# Patient Record
Sex: FEMALE | Race: WHITE | NOT HISPANIC OR LATINO | Employment: OTHER | URBAN - METROPOLITAN AREA
[De-identification: names, ages, dates, MRNs, and addresses within clinical notes are randomized per-mention and may not be internally consistent; named-entity substitution may affect disease eponyms.]

---

## 2017-02-06 ENCOUNTER — HOSPITAL ENCOUNTER (OUTPATIENT)
Dept: RADIOLOGY | Facility: MEDICAL CENTER | Age: 67
End: 2017-02-06
Attending: NURSE PRACTITIONER
Payer: MEDICARE

## 2017-02-06 DIAGNOSIS — Z12.39 SCREENING FOR BREAST CANCER: ICD-10-CM

## 2017-02-06 PROCEDURE — 77063 BREAST TOMOSYNTHESIS BI: CPT

## 2017-04-11 DIAGNOSIS — Z13.1 SCREENING FOR DIABETES MELLITUS (DM): ICD-10-CM

## 2017-04-11 DIAGNOSIS — E03.9 HYPOTHYROIDISM, UNSPECIFIED TYPE: ICD-10-CM

## 2017-04-11 DIAGNOSIS — E78.5 HYPERLIPIDEMIA, UNSPECIFIED HYPERLIPIDEMIA TYPE: ICD-10-CM

## 2017-04-11 DIAGNOSIS — E55.9 VITAMIN D DEFICIENCY: ICD-10-CM

## 2017-04-11 RX ORDER — ERGOCALCIFEROL 1.25 MG/1
CAPSULE ORAL
Qty: 4 CAP | Refills: 0 | Status: SHIPPED | OUTPATIENT
Start: 2017-04-11 | End: 2018-09-05

## 2017-04-20 ENCOUNTER — HOSPITAL ENCOUNTER (EMERGENCY)
Facility: MEDICAL CENTER | Age: 67
End: 2017-04-20
Attending: EMERGENCY MEDICINE
Payer: MEDICARE

## 2017-04-20 ENCOUNTER — APPOINTMENT (OUTPATIENT)
Dept: RADIOLOGY | Facility: MEDICAL CENTER | Age: 67
End: 2017-04-20
Attending: EMERGENCY MEDICINE
Payer: MEDICARE

## 2017-04-20 VITALS
HEIGHT: 62 IN | BODY MASS INDEX: 29.21 KG/M2 | WEIGHT: 158.73 LBS | HEART RATE: 86 BPM | TEMPERATURE: 98.2 F | OXYGEN SATURATION: 98 % | SYSTOLIC BLOOD PRESSURE: 142 MMHG | RESPIRATION RATE: 19 BRPM | DIASTOLIC BLOOD PRESSURE: 73 MMHG

## 2017-04-20 DIAGNOSIS — J01.00 ACUTE MAXILLARY SINUSITIS, RECURRENCE NOT SPECIFIED: ICD-10-CM

## 2017-04-20 DIAGNOSIS — J40 BRONCHITIS: ICD-10-CM

## 2017-04-20 PROCEDURE — 99283 EMERGENCY DEPT VISIT LOW MDM: CPT

## 2017-04-20 PROCEDURE — 71010 DX-CHEST-LIMITED (1 VIEW): CPT

## 2017-04-20 RX ORDER — AZITHROMYCIN 250 MG/1
TABLET, FILM COATED ORAL
Qty: 6 TAB | Refills: 0 | Status: SHIPPED | OUTPATIENT
Start: 2017-04-20 | End: 2017-05-29

## 2017-04-20 ASSESSMENT — LIFESTYLE VARIABLES: DO YOU DRINK ALCOHOL: NO

## 2017-04-20 NOTE — ED NOTES
Pt ambulates to triage with c/c cough, congestion, runny nose & SOB since yesterday.  A&ox4.  Pt to lobby & advised to inform RN of any changes.

## 2017-04-20 NOTE — ED PROVIDER NOTES
ED Provider Note    CHIEF COMPLAINT  Chief Complaint   Patient presents with   • Congestion     since yesterday   • Shortness of Breath   • Cough   • Runny Nose       HPI  Kristen Carrera is a 66 y.o. female who presents for evaluation of upper respiratory symptoms.  Patient presents with 2 day history of nasal congestion, clear rhinorrhea, cough, and shortness of breath.  Patient states that she is taking care of her grandchild was diagnosed with RSV and roseola.  She denies any associated: Fever, purulent sputum, hemoptysis, chest pain, abdominal pain, vomiting, diarrhea, pain or swelling in lower extremities;    REVIEW OF SYSTEMS  See HPI for further details. All other systems negative.    PAST MEDICAL HISTORY  Past Medical History   Diagnosis Date   • Psychiatric disorder    • Anxiety    • Depression    • ADD (attention deficit disorder)    • Hypothyroidism    • Hyperlipidemia    • Arthritis    • PATENT FORAMEN OVALE LEFT TO RIGHT SHUNT 12/8/2010   • History of echocardiogram 3/15/2012   • Abnormal myocardial perfusion study 3/15/2012   • History of abdominoplasty 3/15/2012   • Acute post-traumatic stress disorder 3/15/2012   • Osteoporosis 8/19/2014   • Midline low back pain without sciatica 8/4/2015   • CTS (carpal tunnel syndrome)      B/L       FAMILY HISTORY  Family History   Problem Relation Age of Onset   • Hypertension Mother    • Cancer Mother      UTERUS   • Heart Disease Mother      Arteriosclerotic Cardiovascular Disease   • Other Mother      Coronary Artery Bypass Graft   • Psychiatry Mother    • Arthritis Mother    • Cancer Father      PANCREATIC   • Cancer Sister 51     BREAST   • Stroke Neg Hx    • Diabetes Neg Hx    • Lung Disease Neg Hx        SOCIAL HISTORY  Patient denies tobacco use;    SURGICAL HISTORY  Past Surgical History   Procedure Laterality Date   • Pr anesth,isha hyst fol neuraxial anal/anes     • Appendectomy     • Tubal coagulation laparoscopic bilateral     • Other       TUMMY ZHANE  "BREAST REDUCTION, LIPOSUCTION OF HIPS AND THIGHS.   • Tonsillectomy     • Pr mammary ductogram, single     • Pr reduction of large breast     • Abdominoplasty  1977       CURRENT MEDICATIONS  See nurses notes    ALLERGIES  Allergies   Allergen Reactions   • Hydrocodone      Nightmares and \"makes my skin crawl\"       PHYSICAL EXAM  VITAL SIGNS: /87 mmHg  Pulse 87  Temp(Src) 36.8 °C (98.2 °F) (Temporal)  Resp 18  Ht 1.575 m (5' 2\")  Wt 72 kg (158 lb 11.7 oz)  BMI 29.02 kg/m2  SpO2 95%   Constitutional: A 66-year-old female, awake, oriented ×3   HENT: Normocephalic, Atraumatic, Nares:Clear, Oropharynx: moist, well hydrated, posterior pharynx:clear; minimal tenderness over the maxillary sinus region; mild nasal congestion but no purulent drainage;   Eyes: PERRL, EOMI, Conjunctiva normal, No discharge.   Neck: Normal range of motion, No tenderness, Supple, No stridor.   Lymphatic: No lymphadenopathy noted.   Cardiovascular: Regular rate and rhythm without mumurs, gallups, rubs   Thorax & Lungs: Bilateral rhonchi, No respiratory distress, No wheezing, no stridor, no rales. No chest tenderness.   Abdomen: Soft, nontender, nondistended, no organomegaly, positive bowel sounds normal in quality. No guarding or rebound.  Skin: Good skin turgor, pink, warm, dry. No rashes, petechiae, purpura. Normal capillary refill.   Back: No tenderness, No CVA tenderness.   Extremities: Intact distal pulses, No edema, No tenderness, No cyanosis,  Vascular: Pulses are 2+, symmetric in the upper and lower extremities.  Musculoskeletal: Diffuse arthritic changes. No tenderness to palpation or major deformities noted.   Neurologic: Alert & oriented x 3,  No gross focal deficits noted.   Psychiatric: Affect normal, Judgment normal, Mood normal.       RADIOLOGY/PROCEDURES  DX-CHEST-LIMITED (1 VIEW)   Final Result      No acute cardiopulmonary abnormality identified.            COURSE & MEDICAL DECISION MAKING  Pertinent Labs & Imaging " studies reviewed. (See chart for details)  Discussion: At this time, the patient presents with upper respiratory symptomatology.  Patient's findings are consistent with bronchitis and possibly a very mild early sinusitis.  I see no indication for further laboratory studies or imaging studies.  I discussed the findings and treatment plan the patient.  She indicates that she is comfortable with this expiration and disposition.    FINAL IMPRESSION  1. Bronchitis    2. Acute maxillary sinusitis, recurrence not specified        PLAN  1.  Appropriate discharge instructions given  2.  Azithromycin, Z-Guanakito  3.  Phenergan with codeine, 60 mL   4.  Follow-up with primary care    Electronically signed by: Guy G Gansert, 4/20/2017 1:54 PM

## 2017-04-20 NOTE — DISCHARGE INSTRUCTIONS

## 2017-04-20 NOTE — ED AVS SNAPSHOT
Home Care Instructions                                                                                                                Kristen Carrera   MRN: [id number]    Department:  [facility], Emergency Dept   Date of Visit:  4/20/2017            [facility], Emergency Dept    [address] [address]    Phone:  [phone]      You were seen by     Guy G Gansert, M.D.      Your Diagnosis Was     Bronchitis     J40       Follow-up Information     1. Follow up with RANDELL Kong.    Specialty:  Family Medicine    Contact information    [address]  [address]  [facility] [address] [phone]        Medication Information     Review all of your home medications and newly ordered medications with your primary doctor and/or pharmacist as soon as possible. Follow medication instructions as directed by your doctor and/or pharmacist.     Please keep your complete medication list with you and share with your physician. Update the information when medications are discontinued, doses are changed, or new medications (including over-the-counter products) are added; and carry medication information at all times in the event of emergency situations.               Medication List      START taking these medications        Instructions    Morning Afternoon Evening Bedtime    azithromycin 250 MG Tabs   Commonly known as:  ZITHROMAX        Take two tabs by mouth on day one, then one tab by mouth daily on days 2-5.                        prometh-phenylephrine-codeine 5-6.25-10 MG/5ML Syrp   Commonly known as:  PHENERGAN VC CODEINE        Take 5 mL by mouth every 6 hours as needed (cough).   Dose:  5 mL                          ASK your doctor about these medications        Instructions    Morning Afternoon Evening Bedtime    acetaminophen-codeine #3 300-30 MG Tabs   Commonly known as:  TYLENOL #3        Take 1 Tab by mouth every 6 hours as needed for Moderate Pain or Severe Pain.     Dose:  1 Tab                        acetaminophen/caffeine/butalbital 325-40-50 mg -40 MG Tabs   Commonly known as:  FIORICET        TAKE ONE TABLET BY MOUTH EVERY 8 HOURS AS NEEDED FOR MIGRAINE                        ADDERALL XR (20MG) 20 MG per XR capsule   Generic drug:  amphetamine-dextroamphetamine                             albuterol 108 (90 BASE) MCG/ACT Aers inhalation aerosol        Inhale 2 Puffs by mouth every four hours as needed for Shortness of Breath.   Dose:  2 Puff                        alendronate 70 MG Tabs   Commonly known as:  FOSAMAX        TAKE ONE TABLET BY MOUTH EVERY 7 DAYS                        calcium carbonate 500 MG Tabs   Commonly known as:  OS-ELODIA 500        Take 1 Tab by mouth every day.   Dose:  1250 mg                        meloxicam 7.5 MG Tabs   Commonly known as:  MOBIC        Take 1 Tab by mouth every day.   Dose:  7.5 mg                        PRISTIQ 100 MG Tb24   Generic drug:  Desvenlafaxine Succinate                             ranitidine 150 MG Tabs   Commonly known as:  ZANTAC        TAKE ONE TABLET BY MOUTH TWICE DAILY                        vitamin D (Ergocalciferol) 32151 UNITS Caps capsule   Commonly known as:  DRISDOL        TAKE ONE CAPSULE BY MOUTH ONCE A WEEK FOR 8 WEEKS THEN ONE CAPSULE EVERY 2 WEEKS THEREAFTER                        zolpidem 10 MG Tabs   Commonly known as:  AMBIEN        Take 10 mg by mouth at bedtime as needed. Indications: Trouble Sleeping   Dose:  10 mg                             Where to Get Your Medications      These medications were sent to [facility] [address] [place], [place] [facility] [address] [address]     Phone:  [phone]    - azithromycin 250 MG Tabs      You can get these medications from any pharmacy     Bring a paper prescription for each of these medications    - prometh-phenylephrine-codeine 5-6.25-10 MG/5ML Syrp            Procedures and tests performed during your visit     DX-CHEST-LIMITED (1  VIEW)        Discharge Instructions       Bronchitis  Bronchitis is the body's way of reacting to injury and/or infection (inflammation) of the bronchi. Bronchi are the air tubes that extend from the windpipe into the lungs. If the inflammation becomes severe, it may cause shortness of breath.  CAUSES   Inflammation may be caused by:  · A virus.  · Germs (bacteria).  · Dust.  · Allergens.  · Pollutants and many other irritants.  The cells lining the bronchial tree are covered with tiny hairs (cilia). These constantly beat upward, away from the lungs, toward the mouth. This keeps the lungs free of pollutants. When these cells become too irritated and are unable to do their job, mucus begins to develop. This causes the characteristic cough of bronchitis. The cough clears the lungs when the cilia are unable to do their job. Without either of these protective mechanisms, the mucus would settle in the lungs. Then you would develop pneumonia.  Smoking is a common cause of bronchitis and can contribute to pneumonia. Stopping this habit is the single most important thing you can do to help yourself.  TREATMENT   · Your caregiver may prescribe an antibiotic if the cough is caused by bacteria. Also, medicines that open up your airways make it easier to breathe. Your caregiver may also recommend or prescribe an expectorant. It will loosen the mucus to be coughed up. Only take over-the-counter or prescription medicines for pain, discomfort, or fever as directed by your caregiver.  · Removing whatever causes the problem (smoking, for example) is critical to preventing the problem from getting worse.  · Cough suppressants may be prescribed for relief of cough symptoms.  · Inhaled medicines may be prescribed to help with symptoms now and to help prevent problems from returning.  · For those with recurrent (chronic) bronchitis, there may be a need for steroid medicines.  SEEK IMMEDIATE MEDICAL CARE IF:   · During treatment, you  develop more pus-like mucus (purulent sputum).  · You have a fever.  · Your baby is older than 3 months with a rectal temperature of 102° F (38.9° C) or higher.  · Your baby is 3 months old or younger with a rectal temperature of 100.4° F (38° C) or higher.  · You become progressively more ill.  · You have increased difficulty breathing, wheezing, or shortness of breath.  It is necessary to seek immediate medical care if you are elderly or sick from any other disease.  MAKE SURE YOU:   · Understand these instructions.  · Will watch your condition.  · Will get help right away if you are not doing well or get worse.  Document Released: 12/18/2006 Document Revised: 03/11/2013 Document Reviewed: 10/27/2009  [employer]® Patient Information ©2014 [employer].            Patient Information     Patient Information    Following emergency treatment: all patient requiring follow-up care must return either to a private physician or a clinic if your condition worsens before you are able to obtain further medical attention, please return to the emergency room.     Billing Information    At [facility], we work to make the billing process streamlined for our patients.  Our Representatives are here to answer any questions you may have regarding your hospital bill.  If you have insurance coverage and have supplied your insurance information to us, we will submit a claim to your insurer on your behalf.  Should you have any questions regarding your bill, we can be reached online or by phone as follows:  Online: You are able pay your bills online or live chat with our representatives about any billing questions you may have. We are here to help Monday - Friday from 8:00am to 7:30pm and 9:00am - 12:00pm on Saturdays.  Please visit [email or web address]  for more information.   Phone:  [phone] or [phone]    Please note that your emergency physician, surgeon, pathologist, radiologist,  anesthesiologist, and other specialists are not employed by [facility] and will therefore bill separately for their services.  Please contact them directly for any questions concerning their bills at the numbers below:     Emergency Physician Services:  [phone]  [facility]:  [phone]  [facility]:  [phone]  [facility]:  [phone]    1. Your final bill may vary from the amount quoted upon discharge if all procedures are not complete at that time, or if your doctor has additional procedures of which we are not aware. You will receive an additional bill if you return to the Emergency Department at [facility] for suture removal regardless of the facility of which the sutures were placed.     2. Please arrange for settlement of this account at the emergency registration.    3. All self-pay accounts are due in full at the time of treatment.  If you are unable to meet this obligation then payment is expected within 4-5 days.     4. If you have had radiology studies (CT, X-ray, Ultrasound, MRI), you have received a preliminary result during your emergency department visit. Please contact the radiology department [phone] to receive a copy of your final result. Please discuss the Final result with your primary physician or with the follow up physician provided.     Crisis Hotline:  [place] Crisis Hotline:  4-357-EXFANNO or 1-691.153.8947  [place] Crisis Hotline:    1-206.207.2503 or 129-389-6881         ED Discharge Follow Up Questions    1. In order to provide you with very good care, we would like to follow up with a phone call in the next few days.  May we have your permission to contact you?     YES /  NO    2. What is the best phone number to call you? (       )_____-__________    3. What is the best time to call you?      Morning  /  Afternoon  /  Evening                   Patient Signature:   ____________________________________________________________    Date:  ____________________________________________________________

## 2017-04-20 NOTE — ED NOTES
Patient given d/c instructions and prescriptions, verbalized understanding. AAOx4, VSS, d/c'd home.

## 2017-04-20 NOTE — ED AVS SNAPSHOT
G[employer] Access Code: [email or web address]  Expires: 5/20/2017  3:19 PM    Glo[employer] Technology  A secure, online tool to manage your health information     "[employer]’s GloPos Technology® is a secure, online tool that connects you to your personalized health information from the privacy of your home -- day or night - making it very easy for you to manage your healthcare. Once the activation process is completed, you can even access your medical information using the GloPos Technology alicia, which is available for free in the Apple Alicia store or Google Play store.     G[employer] Technology provides the following levels of access (as shown below):   My Chart Features   [facility] Primary Care Doctor [facility]  Specialists [facility]  Urgent  Care Non-[facility]  Primary Care  Doctor   Email your healthcare team securely and privately 24/7 X X X X   Manage appointments: schedule your next appointment; view details of past/upcoming appointments X      Request prescription refills. X      View recent personal medical records, including lab and immunizations X X X X   View health record, including health history, allergies, medications X X X X   Read reports about your outpatient visits, procedures, consult and ER notes X X X X   See your discharge summary, which is a recap of your hospital and/or ER visit that includes your diagnosis, lab results, and care plan. X X       How to register for Glo[employer] Technology:  1. Go to  [email or web address].  2. Click on the Sign Up Now box, which takes you to the New Member Sign Up page. You will need to provide the following information:  a. Enter your GloPos Technology Access Code exactly as it appears at the top of this page. (You will not need to use this code after you’ve completed the sign-up process. If you do not sign up before the expiration date, you must request a new code.)   b. Enter your date of birth.   c. Enter your home email address.   d. Click Submit, and follow the next screen’s instructions.  3. Create a GloPos Technology ID. This will be your GloPos Technology  login ID and cannot be changed, so think of one that is secure and easy to remember.  4. Create a [employer] password. You can change your password at any time.  5. Enter your Password Reset Question and Answer. This can be used at a later time if you forget your password.   6. Enter your e-mail address. This allows you to receive e-mail notifications when new information is available in [employer].  7. Click Sign Up. You can now view your health information.    For assistance activating your [employer] account, call [phone]

## 2017-04-20 NOTE — ED AVS SNAPSHOT
4/20/2017    Kristen Carrera  [address]  [place] [address]    Dear Kristen:    [facility] wants to ensure your discharge home is safe and you or your loved ones have had all of your questions answered regarding your care after you leave the hospital.    Below is a list of resources and contact information should you have any questions regarding your hospital stay, follow-up instructions, or active medical symptoms.    Questions or Concerns Regarding… Contact   Medical Questions Related to Your Discharge  (7 days a week, 8am-5pm) Contact a Nurse Care Coordinator   [phone]   Medical Questions Not Related to Your Discharge  (24 hours a day / 7 days a week)  Contact the Nurse Health Line   [phone]    Medications or Discharge Instructions Refer to your discharge packet   or contact your [facility] Primary Care Provider   [phone]   Follow-up Appointment(s) Schedule your appointment via [employer]   or contact Scheduling [phone]   Billing Review your statement via [employer]  or contact Billing [phone]   Medical Records Review your records via [employer]   or contact Medical Records [phone]     You may receive a telephone call within two days of discharge. This call is to make certain you understand your discharge instructions and have the opportunity to have any questions answered. You can also easily access your medical information, test results and upcoming appointments via the [employer] free online health management tool. You can learn more and sign up at [email or web address]. For assistance setting up your [employer] account, please call [phone].    Once again, we want to ensure your discharge home is safe and that you have a clear understanding of any next steps in your care. If you have any questions or concerns, please do not hesitate to contact us, we are here for you. Thank you for choosing [facility] for your healthcare needs.    Sincerely,    Your [facility] Healthcare Team

## 2017-04-21 ENCOUNTER — PATIENT OUTREACH (OUTPATIENT)
Dept: HEALTH INFORMATION MANAGEMENT | Facility: OTHER | Age: 67
End: 2017-04-21

## 2017-05-29 ENCOUNTER — HOSPITAL ENCOUNTER (EMERGENCY)
Facility: MEDICAL CENTER | Age: 67
End: 2017-05-29
Payer: MEDICARE

## 2017-05-29 ENCOUNTER — APPOINTMENT (OUTPATIENT)
Dept: RADIOLOGY | Facility: MEDICAL CENTER | Age: 67
End: 2017-05-29
Payer: MEDICARE

## 2017-05-29 VITALS
RESPIRATION RATE: 16 BRPM | BODY MASS INDEX: 30.55 KG/M2 | DIASTOLIC BLOOD PRESSURE: 78 MMHG | HEART RATE: 74 BPM | TEMPERATURE: 97.1 F | HEIGHT: 62 IN | OXYGEN SATURATION: 99 % | WEIGHT: 166.01 LBS | SYSTOLIC BLOOD PRESSURE: 169 MMHG

## 2017-05-29 LAB
ALBUMIN SERPL BCP-MCNC: 3.9 G/DL (ref 3.2–4.9)
ALBUMIN/GLOB SERPL: 1.1 G/DL
ALP SERPL-CCNC: 45 U/L (ref 30–99)
ALT SERPL-CCNC: 16 U/L (ref 2–50)
ANION GAP SERPL CALC-SCNC: 7 MMOL/L (ref 0–11.9)
APTT PPP: 27.5 SEC (ref 24.7–36)
AST SERPL-CCNC: 28 U/L (ref 12–45)
BASOPHILS # BLD AUTO: 1 % (ref 0–1.8)
BASOPHILS # BLD: 0.06 K/UL (ref 0–0.12)
BILIRUB SERPL-MCNC: 0.4 MG/DL (ref 0.1–1.5)
BNP SERPL-MCNC: 86 PG/ML (ref 0–100)
BUN SERPL-MCNC: 25 MG/DL (ref 8–22)
CALCIUM SERPL-MCNC: 9.6 MG/DL (ref 8.5–10.5)
CHLORIDE SERPL-SCNC: 110 MMOL/L (ref 96–112)
CO2 SERPL-SCNC: 24 MMOL/L (ref 20–33)
CREAT SERPL-MCNC: 0.92 MG/DL (ref 0.5–1.4)
EKG IMPRESSION: NORMAL
EOSINOPHIL # BLD AUTO: 0.26 K/UL (ref 0–0.51)
EOSINOPHIL NFR BLD: 4.1 % (ref 0–6.9)
ERYTHROCYTE [DISTWIDTH] IN BLOOD BY AUTOMATED COUNT: 44.9 FL (ref 35.9–50)
EST. AVERAGE GLUCOSE BLD GHB EST-MCNC: 103 MG/DL
GFR SERPL CREATININE-BSD FRML MDRD: >60 ML/MIN/1.73 M 2
GLOBULIN SER CALC-MCNC: 3.4 G/DL (ref 1.9–3.5)
GLUCOSE SERPL-MCNC: 118 MG/DL (ref 65–99)
HBA1C MFR BLD: 5.2 % (ref 0–5.6)
HCT VFR BLD AUTO: 41.1 % (ref 37–47)
HGB BLD-MCNC: 13.6 G/DL (ref 12–16)
IMM GRANULOCYTES # BLD AUTO: 0.01 K/UL (ref 0–0.11)
IMM GRANULOCYTES NFR BLD AUTO: 0.2 % (ref 0–0.9)
INR PPP: 0.95 (ref 0.87–1.13)
LIPASE SERPL-CCNC: 85 U/L (ref 11–82)
LYMPHOCYTES # BLD AUTO: 1.75 K/UL (ref 1–4.8)
LYMPHOCYTES NFR BLD: 27.9 % (ref 22–41)
MCH RBC QN AUTO: 30 PG (ref 27–33)
MCHC RBC AUTO-ENTMCNC: 33.1 G/DL (ref 33.6–35)
MCV RBC AUTO: 90.7 FL (ref 81.4–97.8)
MONOCYTES # BLD AUTO: 0.63 K/UL (ref 0–0.85)
MONOCYTES NFR BLD AUTO: 10 % (ref 0–13.4)
NEUTROPHILS # BLD AUTO: 3.56 K/UL (ref 2–7.15)
NEUTROPHILS NFR BLD: 56.8 % (ref 44–72)
NRBC # BLD AUTO: 0 K/UL
NRBC BLD AUTO-RTO: 0 /100 WBC
PLATELET # BLD AUTO: 264 K/UL (ref 164–446)
PMV BLD AUTO: 9.5 FL (ref 9–12.9)
POTASSIUM SERPL-SCNC: 3.9 MMOL/L (ref 3.6–5.5)
PROT SERPL-MCNC: 7.3 G/DL (ref 6–8.2)
PROTHROMBIN TIME: 13 SEC (ref 12–14.6)
RBC # BLD AUTO: 4.53 M/UL (ref 4.2–5.4)
SODIUM SERPL-SCNC: 141 MMOL/L (ref 135–145)
TROPONIN I SERPL-MCNC: <0.01 NG/ML (ref 0–0.04)
WBC # BLD AUTO: 6.3 K/UL (ref 4.8–10.8)

## 2017-05-29 PROCEDURE — 93005 ELECTROCARDIOGRAM TRACING: CPT

## 2017-05-29 PROCEDURE — 83036 HEMOGLOBIN GLYCOSYLATED A1C: CPT

## 2017-05-29 PROCEDURE — 85610 PROTHROMBIN TIME: CPT

## 2017-05-29 PROCEDURE — 83690 ASSAY OF LIPASE: CPT

## 2017-05-29 PROCEDURE — 83880 ASSAY OF NATRIURETIC PEPTIDE: CPT

## 2017-05-29 PROCEDURE — 85025 COMPLETE CBC W/AUTO DIFF WBC: CPT

## 2017-05-29 PROCEDURE — 85730 THROMBOPLASTIN TIME PARTIAL: CPT

## 2017-05-29 PROCEDURE — 84484 ASSAY OF TROPONIN QUANT: CPT

## 2017-05-29 PROCEDURE — 80053 COMPREHEN METABOLIC PANEL: CPT

## 2017-05-29 PROCEDURE — 302449 STATCHG TRIAGE ONLY (STATISTIC)

## 2017-05-29 ASSESSMENT — PAIN SCALES - GENERAL: PAINLEVEL_OUTOF10: 9

## 2018-08-28 ENCOUNTER — HOSPITAL ENCOUNTER (EMERGENCY)
Facility: MEDICAL CENTER | Age: 68
End: 2018-08-28
Attending: EMERGENCY MEDICINE
Payer: MEDICARE

## 2018-08-28 VITALS
WEIGHT: 149.69 LBS | HEART RATE: 98 BPM | BODY MASS INDEX: 27.55 KG/M2 | TEMPERATURE: 97 F | DIASTOLIC BLOOD PRESSURE: 80 MMHG | OXYGEN SATURATION: 93 % | HEIGHT: 62 IN | SYSTOLIC BLOOD PRESSURE: 127 MMHG | RESPIRATION RATE: 16 BRPM

## 2018-08-28 DIAGNOSIS — K08.89 DENTALGIA: ICD-10-CM

## 2018-08-28 PROCEDURE — A9270 NON-COVERED ITEM OR SERVICE: HCPCS | Performed by: EMERGENCY MEDICINE

## 2018-08-28 PROCEDURE — 99283 EMERGENCY DEPT VISIT LOW MDM: CPT

## 2018-08-28 PROCEDURE — 700102 HCHG RX REV CODE 250 W/ 637 OVERRIDE(OP): Performed by: EMERGENCY MEDICINE

## 2018-08-28 RX ORDER — OXYCODONE HYDROCHLORIDE AND ACETAMINOPHEN 5; 325 MG/1; MG/1
1 TABLET ORAL ONCE
Status: COMPLETED | OUTPATIENT
Start: 2018-08-28 | End: 2018-08-28

## 2018-08-28 RX ORDER — IBUPROFEN 600 MG/1
600 TABLET ORAL ONCE
Status: COMPLETED | OUTPATIENT
Start: 2018-08-28 | End: 2018-08-28

## 2018-08-28 RX ORDER — CEPHALEXIN 500 MG/1
500 CAPSULE ORAL 3 TIMES DAILY
Qty: 21 CAP | Refills: 0 | Status: SHIPPED | OUTPATIENT
Start: 2018-08-28 | End: 2018-09-04

## 2018-08-28 RX ORDER — CEPHALEXIN 500 MG/1
500 CAPSULE ORAL ONCE
Status: COMPLETED | OUTPATIENT
Start: 2018-08-28 | End: 2018-08-28

## 2018-08-28 RX ADMIN — IBUPROFEN 600 MG: 600 TABLET, FILM COATED ORAL at 15:56

## 2018-08-28 RX ADMIN — CEPHALEXIN 500 MG: 500 CAPSULE ORAL at 15:56

## 2018-08-28 RX ADMIN — OXYCODONE HYDROCHLORIDE AND ACETAMINOPHEN 1 TABLET: 5; 325 TABLET ORAL at 15:56

## 2018-08-28 ASSESSMENT — PAIN SCALES - GENERAL: PAINLEVEL_OUTOF10: 4

## 2018-08-28 NOTE — DISCHARGE INSTRUCTIONS
Dental Pain  Dental pain may be caused by many things, including:  · Tooth decay (cavities or caries). Cavities expose the nerve of your tooth to air and hot or cold temperatures. This can cause pain or discomfort.  · Abscess or infection. A dental abscess is a collection of infected pus from a bacterial infection in the inner part of the tooth (pulp). It usually occurs at the end of the tooth’s root.  · Injury.  · An unknown reason (idiopathic).  Your pain may be mild or severe. It may only occur when:  · You are chewing.  · You are exposed to hot or cold temperature.  · You are eating or drinking sugary foods or beverages, such as soda or candy.  Your pain may also be constant.  Follow these instructions at home:  Watch your dental pain for any changes. The following actions may help to lessen any discomfort that you are feeling:  · Take medicines only as directed by your dentist.  · If you were prescribed an antibiotic medicine, finish all of it even if you start to feel better.  · Keep all follow-up visits as directed by your dentist. This is important.  · Do not apply heat to the outside of your face.  · Rinse your mouth or gargle with salt water if directed by your dentist. This helps with pain and swelling.  ¨ You can make salt water by adding ¼ tsp of salt to 1 cup of warm water.  · Apply ice to the painful area of your face:  ¨ Put ice in a plastic bag.  ¨ Place a towel between your skin and the bag.  ¨ Leave the ice on for 20 minutes, 2-3 times per day.  · Avoid foods or drinks that cause you pain, such as:  ¨ Very hot or very cold foods or drinks.  ¨ Sweet or sugary foods or drinks.  Contact a health care provider if:  · Your pain is not controlled with medicines.  · Your symptoms are worse.  · You have new symptoms.  Get help right away if:  · You are unable to open your mouth.  · You are having trouble breathing or swallowing.  · You have a fever.  · Your face, neck, or jaw is swollen.  This information  is not intended to replace advice given to you by your health care provider. Make sure you discuss any questions you have with your health care provider.  Document Released: 12/18/2006 Document Revised: 04/27/2017 Document Reviewed: 12/14/2015  [employer] Interactive Patient Education © 2017 [employer]

## 2018-08-28 NOTE — ED PROVIDER NOTES
ED Provider Note    CHIEF COMPLAINT  Chief Complaint   Patient presents with   • Dental Pain       HPI  Kristen Carrera is a 68 y.o. female who presents with a report of dentalgia at tooth #8 which she says has just about come out and she is hoping for dental extraction but making calls all of her town and nobody takes her insurance product.  She denies any facial swelling, fever, chills, sweats or trauma.  Says that she has extensive dental issues and has not been to a dentist in many years.  Denies any other complaints    ROS  Pertinent negative for fever    PAST MEDICAL HISTORY  Past Medical History:   Diagnosis Date   • Abnormal myocardial perfusion study 3/15/2012   • Acute post-traumatic stress disorder 3/15/2012   • ADD (attention deficit disorder)    • Anxiety    • Arthritis    • CTS (carpal tunnel syndrome)     B/L   • Depression    • History of abdominoplasty 3/15/2012   • History of echocardiogram 3/15/2012   • Hyperlipidemia    • Hypothyroidism    • Midline low back pain without sciatica 8/4/2015   • Osteoporosis 8/19/2014   • PATENT FORAMEN OVALE LEFT TO RIGHT SHUNT 12/8/2010   • Psychiatric disorder        FAMILY HISTORY  Family History   Problem Relation Age of Onset   • Hypertension Mother    • Cancer Mother         UTERUS   • Heart Disease Mother         Arteriosclerotic Cardiovascular Disease   • Other Mother         Coronary Artery Bypass Graft   • Psychiatry Mother    • Arthritis Mother    • Cancer Father         PANCREATIC   • Cancer Sister 51        BREAST   • Stroke Neg Hx    • Diabetes Neg Hx    • Lung Disease Neg Hx        SOCIAL HISTORY   reports that she has never smoked. She has never used smokeless tobacco. She reports that she drinks alcohol. She reports that she does not use drugs.    SURGICAL HISTORY  Past Surgical History:   Procedure Laterality Date   • ABDOMINOPLASTY  1977   • APPENDECTOMY     • OTHER      TUMMY TUCK, BREAST REDUCTION, LIPOSUCTION OF HIPS AND THIGHS.   • PB ANESTH,LOLA  "HYST FOL NEURAXIAL ANAL/ANES     • PB MAMMARY DUCTOGRAM, SINGLE     • PB REDUCTION OF LARGE BREAST     • TONSILLECTOMY     • TUBAL COAGULATION LAPAROSCOPIC BILATERAL         CURRENT MEDICATIONS  Home Medications    **Home medications have not yet been reviewed for this encounter**         ALLERGIES  Allergies   Allergen Reactions   • Hydrocodone      Nightmares and \"makes my skin crawl\"       PHYSICAL EXAM  VITAL SIGNS: /80   Pulse 98   Temp 36.1 °C (97 °F)   Resp 16   Ht 1.575 m (5' 2\")   Wt 67.9 kg (149 lb 11.1 oz)   SpO2 93%   BMI 27.38 kg/m²    Constitutional: Well developed, Well nourished, No acute distress, Non-toxic appearance.   HENT: No sublingual swelling, extensive dental caries, tooth #8 is just about self extracted.  No gingival abscess is seen  Eyes: Normal  Neck:   Lymphatic: No lymphadenopathy  Neurologic:   Psychiatric:       COURSE & MEDICAL DECISION MAKING  Pertinent Labs & Imaging studies reviewed. (See chart for details)  Patient has dentalgia of tooth #8 which has just about self extracted and there is no evidence of surrounding gingival infection but in case she has an underlying dental abscess that is periapical I will provide Keflex therapy for 1 week.  She is also given Motrin and Percocet in the emergency department and discharged in stable condition understands she cannot give be given a prescription for narcotics    FINAL IMPRESSION  1. Dentalgia            Electronically signed by: Santy Mustafa, 8/28/2018 3:42 PM          "

## 2018-08-28 NOTE — ED TRIAGE NOTES
"69 y/o female ambulatory to triage for evaluation of dental pain and a loose teeth. Several of her upper front teeth are loose, she states \"I haven't been to the dentist in 80 years!\"  "

## 2018-09-05 ENCOUNTER — OFFICE VISIT (OUTPATIENT)
Dept: MEDICAL GROUP | Facility: MEDICAL CENTER | Age: 68
End: 2018-09-05
Attending: INTERNAL MEDICINE
Payer: MEDICARE

## 2018-09-05 VITALS
RESPIRATION RATE: 16 BRPM | HEART RATE: 84 BPM | OXYGEN SATURATION: 98 % | WEIGHT: 148 LBS | TEMPERATURE: 98.6 F | BODY MASS INDEX: 27.23 KG/M2 | HEIGHT: 62 IN | SYSTOLIC BLOOD PRESSURE: 160 MMHG | DIASTOLIC BLOOD PRESSURE: 98 MMHG

## 2018-09-05 DIAGNOSIS — Z86.39 HISTORY OF HYPOTHYROIDISM: ICD-10-CM

## 2018-09-05 DIAGNOSIS — Z23 NEED FOR VACCINATION: ICD-10-CM

## 2018-09-05 DIAGNOSIS — Z12.31 SCREENING MAMMOGRAM, ENCOUNTER FOR: ICD-10-CM

## 2018-09-05 DIAGNOSIS — G47.10 HYPERSOMNOLENCE: ICD-10-CM

## 2018-09-05 DIAGNOSIS — M81.0 OSTEOPOROSIS, UNSPECIFIED OSTEOPOROSIS TYPE, UNSPECIFIED PATHOLOGICAL FRACTURE PRESENCE: ICD-10-CM

## 2018-09-05 DIAGNOSIS — F98.8 ATTENTION DEFICIT DISORDER, UNSPECIFIED HYPERACTIVITY PRESENCE: ICD-10-CM

## 2018-09-05 DIAGNOSIS — E78.5 HYPERLIPIDEMIA, UNSPECIFIED HYPERLIPIDEMIA TYPE: ICD-10-CM

## 2018-09-05 DIAGNOSIS — F33.1 MODERATE EPISODE OF RECURRENT MAJOR DEPRESSIVE DISORDER (HCC): ICD-10-CM

## 2018-09-05 DIAGNOSIS — R41.3 MEMORY LOSS: ICD-10-CM

## 2018-09-05 PROCEDURE — 90732 PPSV23 VACC 2 YRS+ SUBQ/IM: CPT | Performed by: INTERNAL MEDICINE

## 2018-09-05 PROCEDURE — 99213 OFFICE O/P EST LOW 20 MIN: CPT | Mod: 25 | Performed by: INTERNAL MEDICINE

## 2018-09-05 PROCEDURE — 99214 OFFICE O/P EST MOD 30 MIN: CPT | Mod: 25 | Performed by: INTERNAL MEDICINE

## 2018-09-05 PROCEDURE — 90471 IMMUNIZATION ADMIN: CPT | Performed by: INTERNAL MEDICINE

## 2018-09-05 PROCEDURE — 90715 TDAP VACCINE 7 YRS/> IM: CPT | Performed by: INTERNAL MEDICINE

## 2018-09-05 PROCEDURE — 90471 IMMUNIZATION ADMIN: CPT | Mod: XU | Performed by: INTERNAL MEDICINE

## 2018-09-05 RX ORDER — DEXTROAMPHETAMINE SACCHARATE, AMPHETAMINE ASPARTATE, DEXTROAMPHETAMINE SULFATE AND AMPHETAMINE SULFATE 2.5; 2.5; 2.5; 2.5 MG/1; MG/1; MG/1; MG/1
TABLET ORAL
COMMUNITY
Start: 2018-08-06 | End: 2022-02-03

## 2018-09-05 RX ORDER — DEXTROAMPHETAMINE SACCHARATE, AMPHETAMINE ASPARTATE MONOHYDRATE, DEXTROAMPHETAMINE SULFATE AND AMPHETAMINE SULFATE 7.5; 7.5; 7.5; 7.5 MG/1; MG/1; MG/1; MG/1
CAPSULE, EXTENDED RELEASE ORAL
COMMUNITY
Start: 2018-08-06 | End: 2023-02-02

## 2018-09-05 ASSESSMENT — PAIN SCALES - GENERAL: PAINLEVEL: NO PAIN

## 2018-09-05 ASSESSMENT — PATIENT HEALTH QUESTIONNAIRE - PHQ9: CLINICAL INTERPRETATION OF PHQ2 SCORE: 0

## 2018-09-05 NOTE — PROGRESS NOTES
Kristen Carrera is a 68 y.o. female here for worsening memory over the past 3-4 months, chronic medical problems listed below, establish care  HPI: Previous PCP was [facility]  Memory loss  Patient reports struggling off and on for the past few years with memory.  She feels like most of her long-term memory is intact but she has difficulty with short-term memory.  She gives some examples today.  She states that she was making a sandwich with mayonnaise and in the middle of making the same which she could not find the mayonnaise until she realized that she was using butter instead.  She also states that she will forget to shower unless she has large notes around telling her to shower because she will go in the bathroom and get distracted by something.  She states that she has thousands of dollars worth of craft items around the house but she cannot finish a project.  States that she frequently misplaces items.  She has no history of head trauma.  She completed high school and cosmetology school.  She does report difficulty focusing in school.  She has a diagnosis of ADD and is currently taking 50 mg of Adderall a day.  She is also taking Ambien because without it she cannot sleep well on the Adderall.  She has been on this for 8 years.  She has no history of TIA or stroke.      Moderate episode of recurrent major depressive disorder (HCC)  Patient reports being isolated to her home except for once a month when she goes shopping and once a week when she visits her great grandson.  Otherwise, she does not leave the house.  She states that her depression has always been difficult to control.  She currently takes Pristiq and follows with psychiatry through [facility].  She denies suicidal ideation.    Osteoporosis  She was diagnosed with osteoporosis in 2014 by DEXA scan.  She was on Fosamax for approximately 2 years and has been off for the past 2 years.  She currently takes calcium and vitamin D 2 tablets daily.  She has  been losing teeth recently and wonders if this is related to her osteoporosis.    Hypersomnolence  Patient reports a history of hypersomnolence.  She was seeing Dr. perkins of sleep medicine until he retired.  This is the reason that she is on Adderall.  She states that without it she will sleep 30 hours in 2 days.    Hyperlipidemia  Patient reports a history of hyperlipidemia but is not currently taking any medications.      ADD (attention deficit disorder)  Patient is currently under the care of Gillian Shukla at [facility].  She is taking Adderall extended release 30 mg in the morning with a 10 mg immediate release tablet and then 10 mg about 3 hours later.  States that this helps with her hypersomnolence as well as her ADD.  No recent dose changes.    History of hypothyroidism  Reports a history of hypothyroidism and had to take thyroid supplementation in the past, however has been off of it for several years.  Reports thyroid testing done 6 months ago was normal through her psychiatrist.    Current medicines (including changes today)  Current Outpatient Prescriptions   Medication Sig Dispense Refill   • amphetamine-dextroamphetamine ER (ADDERALL XR) 30 MG XR capsule      • amphetamine-dextroamphetamine (ADDERALL) 10 MG Tab      • PRISTIQ 100 MG TABLET SR 24 HR      • calcium carbonate (OS-ELODIA 500) 1250 MG TABS Take 1 Tab by mouth every day. 30 Tab 3   • zolpidem (AMBIEN) 10 MG TABS Take 10 mg by mouth at bedtime as needed. Indications: Trouble Sleeping       No current facility-administered medications for this visit.      She  has a past medical history of ADD (attention deficit disorder); Anxiety; Arthritis; CTS (carpal tunnel syndrome); Depression; History of abdominoplasty (3/15/2012); Hyperlipidemia; Hypothyroidism; Midline low back pain without sciatica (8/4/2015); Osteoporosis (8/19/2014); and PATENT FORAMEN OVALE LEFT TO RIGHT SHUNT (12/8/2010).  She  has a past surgical history that includes pr anesth,isha  "hyst fol neuraxial anal/anes; appendectomy; tubal coagulation laparoscopic bilateral; other; tonsillectomy; pr mammary ductogram, single; pr reduction of large breast; and abdominoplasty (1977).  Social History   Substance Use Topics   • Smoking status: Never Smoker   • Smokeless tobacco: Never Used   • Alcohol use No     Social History     Social History Narrative   • No narrative on file     Family History   Problem Relation Age of Onset   • Hypertension Mother    • Cancer Mother         UTERUS   • Heart Disease Mother         Arteriosclerotic Cardiovascular Disease   • Other Mother         Coronary Artery Bypass Graft   • Psychiatry Mother    • Arthritis Mother    • Cancer Father         PANCREATIC   • Cancer Sister 51        BREAST   • Stroke Neg Hx    • Diabetes Neg Hx    • Lung Disease Neg Hx          ROS  As above in HPI  All other systems reviewed and are negative     Objective:     Blood pressure 160/98, pulse 84, temperature 37 °C (98.6 °F), resp. rate 16, height 1.575 m (5' 2.01\"), weight 67.1 kg (148 lb), SpO2 98 %, not currently breastfeeding. Body mass index is 27.06 kg/m².  Physical Exam:    Constitutional: Alert, no distress.  Skin: Warm, dry, good turgor, no rashes in visible areas.  Eye: Equal, round and reactive, conjunctiva clear, lids normal.  ENMT: Lips without lesions, good dentition, oropharynx clear, TM's clear bilaterally.  Neck: Trachea midline, no masses, no thyromegaly. No cervical or supraclavicular lymphadenopathy.  Respiratory: Unlabored respiratory effort, lungs clear to auscultation, no wheezes, no ronchi.  Cardiovascular: Regular rate and rhythm, no murmurs appreciated, no lower extremity edema.  Abdomen: Soft, non-tender, no masses, no hepatosplenomegaly.  Psych: Alert and oriented x3, normal affect and mood. MMSE in clinic today was 30/30        Assessment and Plan:   The following treatment plan was discussed    1. Memory loss  Some of her symptoms sound more related to her " inability to concentrate.  Her perceived memory loss may also be related to the medications that she is taking which include the Adderall and the Ambien.  I have asked her to discuss this with her psychiatrist when she meets with her next week.  We will obtain brain imaging to evaluate for any microvascular ischemic changes or atrophy that could indicate dementia.  We will also get some basic blood work as below.  - TSH WITH REFLEX TO FT4; Future  - COMP METABOLIC PANEL; Future  - CBC WITH DIFFERENTIAL; Future  - MR-BRAIN-W/O; Future    2. Osteoporosis, unspecified osteoporosis type, unspecified pathological fracture presence  Has been off of Fosamax for 2 years and was on it for 2 years prior to that.  Last DEXA scan was done in 2014 and we will obtain an updated DEXA scan to determine further need for bisphosphonate therapy.  She continues on calcium and vitamin D and we will also check her vitamin D level.  -Continue calcium and vitamin D supplementation twice daily  - VITAMIN D,25 HYDROXY; Future  - DS-BONE DENSITY STUDY (DEXA); Future    3. Hyperlipidemia, unspecified hyperlipidemia type  Not on any medication therapy  - LIPID PROFILE; Future    4. Screening mammogram, encounter for  - MA-SCREEN MAMMO W/CAD-BILAT; Future    5. Need for vaccination  - Tdap =>8yo IM  - PneumoVax PPV23 =>3yo    6. Moderate episode of recurrent major depressive disorder (HCC)  Partially controlled.  She will continue to follow-up with psychiatry.  She remains on Pristiq    7. Hypersomnolence  Controlled with Adderall which she receives from psychiatry    8. Attention deficit disorder, unspecified hyperactivity presence  Controlled with Adderall which she receives from psychiatry    9. History of hypothyroidism  Off of medication at this time.  We will obtain thyroid labs  - TSH WITH REFLEX TO FT4; Future        Followup: Return in about 6 months (around 3/5/2019), or if symptoms worsen or fail to improve.

## 2018-09-05 NOTE — ASSESSMENT & PLAN NOTE
Patient reports a history of hypersomnolence.  She was seeing Dr. perkins of sleep medicine until he retired.  This is the reason that she is on Adderall.  She states that without it she will sleep 30 hours in 2 days.

## 2018-09-05 NOTE — ASSESSMENT & PLAN NOTE
Patient reports struggling off and on for the past few years with memory.  She feels like most of her long-term memory is intact but she has difficulty with short-term memory.  She gives some examples today.  She states that she was making a sandwich with mayonnaise and in the middle of making the same which she could not find the mayonnaise until she realized that she was using butter instead.  She also states that she will forget to shower unless she has large notes around telling her to shower because she will go in the bathroom and get distracted by something.  She states that she has thousands of dollars worth of craft items around the house but she cannot finish a project.  States that she frequently misplaces items.  She has no history of head trauma.  She completed high school and cosmetology school.  She does report difficulty focusing in school.  She has a diagnosis of ADD and is currently taking 50 mg of Adderall a day.  She is also taking Ambien because without it she cannot sleep well on the Adderall.  She has been on this for 8 years.  She has no history of TIA or stroke.

## 2018-09-05 NOTE — ASSESSMENT & PLAN NOTE
Patient is currently under the care of Gillian Shukla at [facility].  She is taking Adderall extended release 30 mg in the morning with a 10 mg immediate release tablet and then 10 mg about 3 hours later.  States that this helps with her hypersomnolence as well as her ADD.  No recent dose changes.

## 2018-09-05 NOTE — ASSESSMENT & PLAN NOTE
Patient reports being isolated to her home except for once a month when she goes shopping and once a week when she visits her great grandson.  Otherwise, she does not leave the house.  She states that her depression has always been difficult to control.  She currently takes Pristiq and follows with psychiatry through [facility].  She denies suicidal ideation.

## 2018-09-05 NOTE — ASSESSMENT & PLAN NOTE
Reports a history of hypothyroidism and had to take thyroid supplementation in the past, however has been off of it for several years.  Reports thyroid testing done 6 months ago was normal through her psychiatrist.

## 2018-09-05 NOTE — ASSESSMENT & PLAN NOTE
She was diagnosed with osteoporosis in 2014 by DEXA scan.  She was on Fosamax for approximately 2 years and has been off for the past 2 years.  She currently takes calcium and vitamin D 2 tablets daily.  She has been losing teeth recently and wonders if this is related to her osteoporosis.

## 2018-09-07 ENCOUNTER — OFFICE VISIT (OUTPATIENT)
Dept: MEDICAL GROUP | Facility: MEDICAL CENTER | Age: 68
End: 2018-09-07
Attending: INTERNAL MEDICINE
Payer: MEDICARE

## 2018-09-07 DIAGNOSIS — T50.905A REACTION TO SHOT, INITIAL ENCOUNTER: ICD-10-CM

## 2018-09-07 PROCEDURE — 99211 OFF/OP EST MAY X REQ PHY/QHP: CPT | Performed by: INTERNAL MEDICINE

## 2018-09-07 RX ORDER — DIPHENHYDRAMINE HCL 25 MG
CAPSULE ORAL
Qty: 10 CAP | Refills: 0 | Status: SHIPPED | OUTPATIENT
Start: 2018-09-07 | End: 2018-09-28

## 2018-09-07 NOTE — PROGRESS NOTES
Subjective:   Kristen Carrera is a 68 y.o. female here today for possible reaction to vaccine    Reaction to shot  Patient presents for redness and tenderness over the place where she received her tetanus and Pneumovax shots 2 days ago.  She states that she noticed it this morning.  She reports sweats this morning but denies fevers or chills.  She denies issues with mobility in her arm.  She is not having any shortness of breath, throat swelling, or itching.       Current medicines (including changes today)  Current Outpatient Prescriptions   Medication Sig Dispense Refill   • diphenhydrAMINE (BENADRYL) 25 MG capsule Take 1-2 tabs up to twice a day as needed for reaction to vaccine 10 Cap 0   • amphetamine-dextroamphetamine ER (ADDERALL XR) 30 MG XR capsule      • amphetamine-dextroamphetamine (ADDERALL) 10 MG Tab      • PRISTIQ 100 MG TABLET SR 24 HR      • calcium carbonate (OS-ELODIA 500) 1250 MG TABS Take 1 Tab by mouth every day. 30 Tab 3   • zolpidem (AMBIEN) 10 MG TABS Take 10 mg by mouth at bedtime as needed. Indications: Trouble Sleeping       No current facility-administered medications for this visit.      She  has a past medical history of ADD (attention deficit disorder); Anxiety; Arthritis; CTS (carpal tunnel syndrome); Depression; History of abdominoplasty (3/15/2012); Hyperlipidemia; Hypothyroidism; Midline low back pain without sciatica (8/4/2015); Osteoporosis (8/19/2014); and PATENT FORAMEN OVALE LEFT TO RIGHT SHUNT (12/8/2010).    ROS   Denies chest pain, abdominal pain  As above in HPI     Objective:       Physical Exam:  Constitutional: Alert, no distress.  Skin: Warm, dry, good turgor, right deltoid with some redness and increased warmth but no evidence of cellulitis, mild tenderness to palpation over the upper aspect of the red area, in total approximately 4 cm of redness.    Assessment and Plan:   The following treatment plan was discussed    1. Reaction to shot, initial encounter  Given no  evidence of cellulitis, suspect that this is an allergic reaction to either the Tdap or the Pneumovax.  It is very localized and she is not having any systemic symptoms.  We will treat her with some oral Benadryl now with a repeat dose this evening if she is still symptomatic.  We discussed that if she starts to develop fevers, chills, worsening pain in her arm or difficulty moving her arm, or if the redness spreads, that she should be evaluated in the emergency room.  Otherwise, I expect her symptoms to resolve shortly.  - diphenhydrAMINE (BENADRYL) 25 MG capsule; Take 1-2 tabs up to twice a day as needed for reaction to vaccine  Dispense: 10 Cap; Refill: 0        Followup: Return if symptoms worsen or fail to improve.

## 2018-09-07 NOTE — ASSESSMENT & PLAN NOTE
Patient presents for redness and tenderness over the place where she received her tetanus and Pneumovax shots 2 days ago.  She states that she noticed it this morning.  She reports sweats this morning but denies fevers or chills.  She denies issues with mobility in her arm.  She is not having any shortness of breath, throat swelling, or itching.

## 2018-09-08 ENCOUNTER — HOSPITAL ENCOUNTER (OUTPATIENT)
Dept: RADIOLOGY | Facility: MEDICAL CENTER | Age: 68
End: 2018-09-08
Attending: INTERNAL MEDICINE
Payer: MEDICARE

## 2018-09-08 DIAGNOSIS — R41.3 MEMORY LOSS: ICD-10-CM

## 2018-09-08 PROCEDURE — 70551 MRI BRAIN STEM W/O DYE: CPT

## 2018-09-11 ENCOUNTER — HOSPITAL ENCOUNTER (OUTPATIENT)
Dept: RADIOLOGY | Facility: MEDICAL CENTER | Age: 68
End: 2018-09-11
Attending: INTERNAL MEDICINE
Payer: MEDICARE

## 2018-09-11 DIAGNOSIS — M81.0 OSTEOPOROSIS, UNSPECIFIED OSTEOPOROSIS TYPE, UNSPECIFIED PATHOLOGICAL FRACTURE PRESENCE: ICD-10-CM

## 2018-09-11 DIAGNOSIS — Z12.31 SCREENING MAMMOGRAM, ENCOUNTER FOR: ICD-10-CM

## 2018-09-11 PROCEDURE — 77067 SCR MAMMO BI INCL CAD: CPT

## 2018-09-11 PROCEDURE — 77080 DXA BONE DENSITY AXIAL: CPT

## 2018-09-14 ENCOUNTER — TELEPHONE (OUTPATIENT)
Dept: MEDICAL GROUP | Facility: MEDICAL CENTER | Age: 68
End: 2018-09-14

## 2018-09-15 NOTE — TELEPHONE ENCOUNTER
Phone Number Called: [phone] (home)       Message: Pt. Advised.     Left Message for patient to call back: N\A

## 2018-09-15 NOTE — TELEPHONE ENCOUNTER
----- Message from Binta To M.D. sent at 9/13/2018  9:04 AM PDT -----  Please let patient know her DEXA scan came back showing osteopenia, which is improved from 2014 when she had osteoporosis.  This is probably because she was on the bone density medication for 2 years.  I recommend that she continues taking the calcium and vitamin D supplements but we do not need to restart the Fosamax.    Binta To M.D.

## 2018-09-28 ENCOUNTER — HOSPITAL ENCOUNTER (OUTPATIENT)
Facility: MEDICAL CENTER | Age: 68
End: 2018-09-29
Attending: EMERGENCY MEDICINE | Admitting: HOSPITALIST
Payer: MEDICARE

## 2018-09-28 ENCOUNTER — APPOINTMENT (OUTPATIENT)
Dept: RADIOLOGY | Facility: MEDICAL CENTER | Age: 68
End: 2018-09-28
Attending: EMERGENCY MEDICINE
Payer: MEDICARE

## 2018-09-28 DIAGNOSIS — D72.825 BANDEMIA: ICD-10-CM

## 2018-09-28 DIAGNOSIS — L03.119 CELLULITIS OF FOOT: ICD-10-CM

## 2018-09-28 PROBLEM — R79.9 ELEVATED BUN: Status: ACTIVE | Noted: 2018-09-28

## 2018-09-28 PROBLEM — G47.00 INSOMNIA: Status: ACTIVE | Noted: 2018-09-28

## 2018-09-28 PROBLEM — L03.115 CELLULITIS OF RIGHT FOOT: Status: ACTIVE | Noted: 2018-09-28

## 2018-09-28 LAB
ALBUMIN SERPL BCP-MCNC: 3.8 G/DL (ref 3.2–4.9)
ALBUMIN/GLOB SERPL: 1 G/DL
ALP SERPL-CCNC: 57 U/L (ref 30–99)
ALT SERPL-CCNC: 9 U/L (ref 2–50)
ANION GAP SERPL CALC-SCNC: 9 MMOL/L (ref 0–11.9)
AST SERPL-CCNC: 15 U/L (ref 12–45)
BASOPHILS # BLD AUTO: 0.9 % (ref 0–1.8)
BASOPHILS # BLD: 0.07 K/UL (ref 0–0.12)
BILIRUB SERPL-MCNC: 0.4 MG/DL (ref 0.1–1.5)
BUN SERPL-MCNC: 27 MG/DL (ref 8–22)
CALCIUM SERPL-MCNC: 10.2 MG/DL (ref 8.5–10.5)
CHLORIDE SERPL-SCNC: 108 MMOL/L (ref 96–112)
CO2 SERPL-SCNC: 24 MMOL/L (ref 20–33)
CREAT SERPL-MCNC: 1.08 MG/DL (ref 0.5–1.4)
EOSINOPHIL # BLD AUTO: 0 K/UL (ref 0–0.51)
EOSINOPHIL NFR BLD: 0 % (ref 0–6.9)
ERYTHROCYTE [DISTWIDTH] IN BLOOD BY AUTOMATED COUNT: 42 FL (ref 35.9–50)
GLOBULIN SER CALC-MCNC: 3.9 G/DL (ref 1.9–3.5)
GLUCOSE SERPL-MCNC: 96 MG/DL (ref 65–99)
HCT VFR BLD AUTO: 35.2 % (ref 37–47)
HGB BLD-MCNC: 12.2 G/DL (ref 12–16)
LACTATE BLD-SCNC: 0.9 MMOL/L (ref 0.5–2)
LYMPHOCYTES # BLD AUTO: 1.81 K/UL (ref 1–4.8)
LYMPHOCYTES NFR BLD: 23.5 % (ref 22–41)
MANUAL DIFF BLD: NORMAL
MCH RBC QN AUTO: 30.7 PG (ref 27–33)
MCHC RBC AUTO-ENTMCNC: 34.7 G/DL (ref 33.6–35)
MCV RBC AUTO: 88.7 FL (ref 81.4–97.8)
MONOCYTES # BLD AUTO: 0.6 K/UL (ref 0–0.85)
MONOCYTES NFR BLD AUTO: 7.8 % (ref 0–13.4)
MORPHOLOGY BLD-IMP: NORMAL
NEUTROPHILS # BLD AUTO: 5.22 K/UL (ref 2–7.15)
NEUTROPHILS NFR BLD: 67.8 % (ref 44–72)
NRBC # BLD AUTO: 0 K/UL
NRBC BLD-RTO: 0 /100 WBC
PLATELET # BLD AUTO: 260 K/UL (ref 164–446)
PLATELET BLD QL SMEAR: NORMAL
PMV BLD AUTO: 9.4 FL (ref 9–12.9)
POTASSIUM SERPL-SCNC: 3.8 MMOL/L (ref 3.6–5.5)
PROT SERPL-MCNC: 7.7 G/DL (ref 6–8.2)
RBC # BLD AUTO: 3.97 M/UL (ref 4.2–5.4)
RBC BLD AUTO: NORMAL
SODIUM SERPL-SCNC: 141 MMOL/L (ref 135–145)
WBC # BLD AUTO: 7.7 K/UL (ref 4.8–10.8)

## 2018-09-28 PROCEDURE — G0378 HOSPITAL OBSERVATION PER HR: HCPCS

## 2018-09-28 PROCEDURE — 85027 COMPLETE CBC AUTOMATED: CPT

## 2018-09-28 PROCEDURE — 700101 HCHG RX REV CODE 250: Performed by: EMERGENCY MEDICINE

## 2018-09-28 PROCEDURE — 80053 COMPREHEN METABOLIC PANEL: CPT

## 2018-09-28 PROCEDURE — 73630 X-RAY EXAM OF FOOT: CPT | Mod: RT

## 2018-09-28 PROCEDURE — 700105 HCHG RX REV CODE 258: Performed by: HOSPITALIST

## 2018-09-28 PROCEDURE — 83605 ASSAY OF LACTIC ACID: CPT

## 2018-09-28 PROCEDURE — 96365 THER/PROPH/DIAG IV INF INIT: CPT

## 2018-09-28 PROCEDURE — 99220 PR INITIAL OBSERVATION CARE,LEVL III: CPT | Performed by: HOSPITALIST

## 2018-09-28 PROCEDURE — 36415 COLL VENOUS BLD VENIPUNCTURE: CPT

## 2018-09-28 PROCEDURE — 99285 EMERGENCY DEPT VISIT HI MDM: CPT

## 2018-09-28 PROCEDURE — 85007 BL SMEAR W/DIFF WBC COUNT: CPT

## 2018-09-28 RX ORDER — BISACODYL 10 MG
10 SUPPOSITORY, RECTAL RECTAL
Status: DISCONTINUED | OUTPATIENT
Start: 2018-09-28 | End: 2018-09-29 | Stop reason: HOSPADM

## 2018-09-28 RX ORDER — AMOXICILLIN 250 MG
2 CAPSULE ORAL 2 TIMES DAILY
Status: DISCONTINUED | OUTPATIENT
Start: 2018-09-28 | End: 2018-09-29 | Stop reason: HOSPADM

## 2018-09-28 RX ORDER — OXYCODONE HYDROCHLORIDE 5 MG/1
5 TABLET ORAL
Status: DISCONTINUED | OUTPATIENT
Start: 2018-09-28 | End: 2018-09-29 | Stop reason: HOSPADM

## 2018-09-28 RX ORDER — CLINDAMYCIN PHOSPHATE 600 MG/50ML
600 INJECTION, SOLUTION INTRAVENOUS ONCE
Status: COMPLETED | OUTPATIENT
Start: 2018-09-28 | End: 2018-09-28

## 2018-09-28 RX ORDER — CLINDAMYCIN PHOSPHATE 600 MG/50ML
600 INJECTION, SOLUTION INTRAVENOUS EVERY 8 HOURS
Status: DISCONTINUED | OUTPATIENT
Start: 2018-09-29 | End: 2018-09-29 | Stop reason: HOSPADM

## 2018-09-28 RX ORDER — HYDROMORPHONE HYDROCHLORIDE 2 MG/ML
0.5 INJECTION, SOLUTION INTRAMUSCULAR; INTRAVENOUS; SUBCUTANEOUS
Status: DISCONTINUED | OUTPATIENT
Start: 2018-09-28 | End: 2018-09-29 | Stop reason: HOSPADM

## 2018-09-28 RX ORDER — ZOLPIDEM TARTRATE 5 MG/1
10 TABLET ORAL
Status: DISCONTINUED | OUTPATIENT
Start: 2018-09-28 | End: 2018-09-29 | Stop reason: HOSPADM

## 2018-09-28 RX ORDER — DEXTROAMPHETAMINE SACCHARATE, AMPHETAMINE ASPARTATE, DEXTROAMPHETAMINE SULFATE AND AMPHETAMINE SULFATE 2.5; 2.5; 2.5; 2.5 MG/1; MG/1; MG/1; MG/1
20 TABLET ORAL DAILY
Status: DISCONTINUED | OUTPATIENT
Start: 2018-09-29 | End: 2018-09-29 | Stop reason: HOSPADM

## 2018-09-28 RX ORDER — HEPARIN SODIUM 5000 [USP'U]/ML
5000 INJECTION, SOLUTION INTRAVENOUS; SUBCUTANEOUS EVERY 8 HOURS
Status: DISCONTINUED | OUTPATIENT
Start: 2018-09-29 | End: 2018-09-29 | Stop reason: HOSPADM

## 2018-09-28 RX ORDER — SODIUM CHLORIDE 9 MG/ML
INJECTION, SOLUTION INTRAVENOUS CONTINUOUS
Status: DISCONTINUED | OUTPATIENT
Start: 2018-09-28 | End: 2018-09-29 | Stop reason: HOSPADM

## 2018-09-28 RX ORDER — POLYETHYLENE GLYCOL 3350 17 G/17G
1 POWDER, FOR SOLUTION ORAL
Status: DISCONTINUED | OUTPATIENT
Start: 2018-09-28 | End: 2018-09-29 | Stop reason: HOSPADM

## 2018-09-28 RX ORDER — ONDANSETRON 2 MG/ML
4 INJECTION INTRAMUSCULAR; INTRAVENOUS EVERY 4 HOURS PRN
Status: DISCONTINUED | OUTPATIENT
Start: 2018-09-28 | End: 2018-09-29 | Stop reason: HOSPADM

## 2018-09-28 RX ORDER — OXYCODONE HYDROCHLORIDE 10 MG/1
10 TABLET ORAL
Status: DISCONTINUED | OUTPATIENT
Start: 2018-09-28 | End: 2018-09-29 | Stop reason: HOSPADM

## 2018-09-28 RX ORDER — ONDANSETRON 4 MG/1
4 TABLET, ORALLY DISINTEGRATING ORAL EVERY 4 HOURS PRN
Status: DISCONTINUED | OUTPATIENT
Start: 2018-09-28 | End: 2018-09-29 | Stop reason: HOSPADM

## 2018-09-28 RX ORDER — SODIUM CHLORIDE 9 MG/ML
INJECTION, SOLUTION INTRAVENOUS CONTINUOUS
Status: DISCONTINUED | OUTPATIENT
Start: 2018-09-28 | End: 2018-09-28

## 2018-09-28 RX ADMIN — CLINDAMYCIN IN 5 PERCENT DEXTROSE 600 MG: 12 INJECTION, SOLUTION INTRAVENOUS at 20:30

## 2018-09-28 RX ADMIN — SODIUM CHLORIDE: 9 INJECTION, SOLUTION INTRAVENOUS at 23:57

## 2018-09-28 ASSESSMENT — ENCOUNTER SYMPTOMS
DIZZINESS: 0
BRUISES/BLEEDS EASILY: 0
BLURRED VISION: 0
DOUBLE VISION: 0
PALPITATIONS: 0
NAUSEA: 0
EYE DISCHARGE: 0
MYALGIAS: 0
HALLUCINATIONS: 0
HEMOPTYSIS: 0
SPEECH CHANGE: 0
DEPRESSION: 0
FOCAL WEAKNESS: 0
WEAKNESS: 0
FEVER: 0
SENSORY CHANGE: 0
ABDOMINAL PAIN: 0
CHILLS: 0
COUGH: 0
FLANK PAIN: 0
SHORTNESS OF BREATH: 0
HEARTBURN: 0
VOMITING: 0

## 2018-09-28 ASSESSMENT — LIFESTYLE VARIABLES: SUBSTANCE_ABUSE: 0

## 2018-09-28 ASSESSMENT — PAIN SCALES - GENERAL
PAINLEVEL_OUTOF10: 2
PAINLEVEL_OUTOF10: 0
PAINLEVEL_OUTOF10: 0

## 2018-09-28 ASSESSMENT — PAIN DESCRIPTION - DESCRIPTORS: DESCRIPTORS: ACHING

## 2018-09-29 ENCOUNTER — APPOINTMENT (OUTPATIENT)
Dept: RADIOLOGY | Facility: MEDICAL CENTER | Age: 68
End: 2018-09-29
Attending: HOSPITALIST
Payer: MEDICARE

## 2018-09-29 VITALS
WEIGHT: 146.83 LBS | BODY MASS INDEX: 27.02 KG/M2 | OXYGEN SATURATION: 95 % | HEIGHT: 62 IN | DIASTOLIC BLOOD PRESSURE: 64 MMHG | HEART RATE: 82 BPM | SYSTOLIC BLOOD PRESSURE: 135 MMHG | RESPIRATION RATE: 16 BRPM | TEMPERATURE: 98 F

## 2018-09-29 PROBLEM — N18.30 CKD (CHRONIC KIDNEY DISEASE) STAGE 3, GFR 30-59 ML/MIN: Status: ACTIVE | Noted: 2018-09-29

## 2018-09-29 PROBLEM — L03.115 CELLULITIS OF RIGHT FOOT: Status: RESOLVED | Noted: 2018-09-28 | Resolved: 2018-09-29

## 2018-09-29 PROBLEM — R79.9 ELEVATED BUN: Status: RESOLVED | Noted: 2018-09-28 | Resolved: 2018-09-29

## 2018-09-29 LAB
ALBUMIN SERPL BCP-MCNC: 3.5 G/DL (ref 3.2–4.9)
ALBUMIN/GLOB SERPL: 1 G/DL
ALP SERPL-CCNC: 51 U/L (ref 30–99)
ALT SERPL-CCNC: 8 U/L (ref 2–50)
ANION GAP SERPL CALC-SCNC: 10 MMOL/L (ref 0–11.9)
AST SERPL-CCNC: 14 U/L (ref 12–45)
BASOPHILS # BLD AUTO: 0 % (ref 0–1.8)
BASOPHILS # BLD: 0 K/UL (ref 0–0.12)
BILIRUB SERPL-MCNC: 0.4 MG/DL (ref 0.1–1.5)
BUN SERPL-MCNC: 21 MG/DL (ref 8–22)
CALCIUM SERPL-MCNC: 9.4 MG/DL (ref 8.5–10.5)
CHLORIDE SERPL-SCNC: 110 MMOL/L (ref 96–112)
CHOLEST SERPL-MCNC: 163 MG/DL (ref 100–199)
CO2 SERPL-SCNC: 21 MMOL/L (ref 20–33)
CREAT SERPL-MCNC: 0.97 MG/DL (ref 0.5–1.4)
CRP SERPL HS-MCNC: 3.4 MG/DL (ref 0–0.75)
EOSINOPHIL # BLD AUTO: 0.34 K/UL (ref 0–0.51)
EOSINOPHIL NFR BLD: 5.2 % (ref 0–6.9)
ERYTHROCYTE [DISTWIDTH] IN BLOOD BY AUTOMATED COUNT: 41.5 FL (ref 35.9–50)
ERYTHROCYTE [SEDIMENTATION RATE] IN BLOOD BY WESTERGREN METHOD: 46 MM/HOUR (ref 0–30)
EST. AVERAGE GLUCOSE BLD GHB EST-MCNC: 105 MG/DL
GLOBULIN SER CALC-MCNC: 3.4 G/DL (ref 1.9–3.5)
GLUCOSE SERPL-MCNC: 98 MG/DL (ref 65–99)
HBA1C MFR BLD: 5.3 % (ref 0–5.6)
HCT VFR BLD AUTO: 35.3 % (ref 37–47)
HDLC SERPL-MCNC: 53 MG/DL
HGB BLD-MCNC: 11.8 G/DL (ref 12–16)
LDLC SERPL CALC-MCNC: 98 MG/DL
LYMPHOCYTES # BLD AUTO: 2.2 K/UL (ref 1–4.8)
LYMPHOCYTES NFR BLD: 33.9 % (ref 22–41)
MANUAL DIFF BLD: NORMAL
MCH RBC QN AUTO: 29.6 PG (ref 27–33)
MCHC RBC AUTO-ENTMCNC: 33.4 G/DL (ref 33.6–35)
MCV RBC AUTO: 88.7 FL (ref 81.4–97.8)
METAMYELOCYTES NFR BLD MANUAL: 0.9 %
MONOCYTES # BLD AUTO: 0.57 K/UL (ref 0–0.85)
MONOCYTES NFR BLD AUTO: 8.7 % (ref 0–13.4)
MORPHOLOGY BLD-IMP: NORMAL
NEUTROPHILS # BLD AUTO: 3.33 K/UL (ref 2–7.15)
NEUTROPHILS NFR BLD: 51.3 % (ref 44–72)
NRBC # BLD AUTO: 0 K/UL
NRBC BLD-RTO: 0 /100 WBC
OVALOCYTES BLD QL SMEAR: NORMAL
PLATELET # BLD AUTO: 270 K/UL (ref 164–446)
PLATELET BLD QL SMEAR: NORMAL
PMV BLD AUTO: 9.5 FL (ref 9–12.9)
POLYCHROMASIA BLD QL SMEAR: NORMAL
POTASSIUM SERPL-SCNC: 3.7 MMOL/L (ref 3.6–5.5)
PROT SERPL-MCNC: 6.9 G/DL (ref 6–8.2)
RBC # BLD AUTO: 3.98 M/UL (ref 4.2–5.4)
RBC BLD AUTO: PRESENT
SODIUM SERPL-SCNC: 141 MMOL/L (ref 135–145)
TRIGL SERPL-MCNC: 62 MG/DL (ref 0–149)
TSH SERPL DL<=0.005 MIU/L-ACNC: 4.31 UIU/ML (ref 0.38–5.33)
WBC # BLD AUTO: 6.5 K/UL (ref 4.8–10.8)

## 2018-09-29 PROCEDURE — 93970 EXTREMITY STUDY: CPT

## 2018-09-29 PROCEDURE — 99217 PR OBSERVATION CARE DISCHARGE: CPT | Performed by: INTERNAL MEDICINE

## 2018-09-29 PROCEDURE — 36415 COLL VENOUS BLD VENIPUNCTURE: CPT

## 2018-09-29 PROCEDURE — 96372 THER/PROPH/DIAG INJ SC/IM: CPT

## 2018-09-29 PROCEDURE — G0378 HOSPITAL OBSERVATION PER HR: HCPCS

## 2018-09-29 PROCEDURE — 83036 HEMOGLOBIN GLYCOSYLATED A1C: CPT

## 2018-09-29 PROCEDURE — 80061 LIPID PANEL: CPT

## 2018-09-29 PROCEDURE — 96366 THER/PROPH/DIAG IV INF ADDON: CPT

## 2018-09-29 PROCEDURE — 700111 HCHG RX REV CODE 636 W/ 250 OVERRIDE (IP): Performed by: HOSPITALIST

## 2018-09-29 PROCEDURE — 700102 HCHG RX REV CODE 250 W/ 637 OVERRIDE(OP): Performed by: HOSPITALIST

## 2018-09-29 PROCEDURE — 86140 C-REACTIVE PROTEIN: CPT

## 2018-09-29 PROCEDURE — 700101 HCHG RX REV CODE 250: Performed by: HOSPITALIST

## 2018-09-29 PROCEDURE — 84443 ASSAY THYROID STIM HORMONE: CPT

## 2018-09-29 PROCEDURE — 85007 BL SMEAR W/DIFF WBC COUNT: CPT

## 2018-09-29 PROCEDURE — 80053 COMPREHEN METABOLIC PANEL: CPT

## 2018-09-29 PROCEDURE — 85027 COMPLETE CBC AUTOMATED: CPT

## 2018-09-29 PROCEDURE — 85652 RBC SED RATE AUTOMATED: CPT

## 2018-09-29 PROCEDURE — A9270 NON-COVERED ITEM OR SERVICE: HCPCS | Performed by: HOSPITALIST

## 2018-09-29 RX ORDER — CLINDAMYCIN HYDROCHLORIDE 150 MG/1
450 CAPSULE ORAL EVERY 6 HOURS
Qty: 84 CAP | Refills: 0 | Status: SHIPPED | OUTPATIENT
Start: 2018-09-29 | End: 2018-10-06

## 2018-09-29 RX ORDER — ASCORBIC ACID 1000 MG
TABLET ORAL DAILY
COMMUNITY
End: 2019-01-11

## 2018-09-29 RX ORDER — IBUPROFEN 400 MG/1
400 TABLET ORAL EVERY 8 HOURS PRN
Qty: 10 TAB | Refills: 0 | COMMUNITY
Start: 2018-09-29 | End: 2018-10-18

## 2018-09-29 RX ORDER — CHLORAL HYDRATE 500 MG
1000 CAPSULE ORAL DAILY
COMMUNITY
End: 2019-01-11

## 2018-09-29 RX ADMIN — HEPARIN SODIUM 5000 UNITS: 5000 INJECTION, SOLUTION INTRAVENOUS; SUBCUTANEOUS at 05:34

## 2018-09-29 RX ADMIN — OXYCODONE HYDROCHLORIDE 10 MG: 10 TABLET ORAL at 04:14

## 2018-09-29 RX ADMIN — CLINDAMYCIN IN 5 PERCENT DEXTROSE 600 MG: 12 INJECTION, SOLUTION INTRAVENOUS at 05:34

## 2018-09-29 ASSESSMENT — LIFESTYLE VARIABLES
TOTAL SCORE: 0
HAVE PEOPLE ANNOYED YOU BY CRITICIZING YOUR DRINKING: NO
CONSUMPTION TOTAL: NEGATIVE
HOW MANY TIMES IN THE PAST YEAR HAVE YOU HAD 5 OR MORE DRINKS IN A DAY: 0
ON A TYPICAL DAY WHEN YOU DRINK ALCOHOL HOW MANY DRINKS DO YOU HAVE: 1
ALCOHOL_USE: YES
TOTAL SCORE: 0
HAVE YOU EVER FELT YOU SHOULD CUT DOWN ON YOUR DRINKING: NO
EVER FELT BAD OR GUILTY ABOUT YOUR DRINKING: NO
EVER_SMOKED: NEVER
AVERAGE NUMBER OF DAYS PER WEEK YOU HAVE A DRINK CONTAINING ALCOHOL: 1
EVER HAD A DRINK FIRST THING IN THE MORNING TO STEADY YOUR NERVES TO GET RID OF A HANGOVER: NO
TOTAL SCORE: 0

## 2018-09-29 ASSESSMENT — PAIN SCALES - GENERAL: PAINLEVEL_OUTOF10: 6

## 2018-09-29 ASSESSMENT — PATIENT HEALTH QUESTIONNAIRE - PHQ9
SUM OF ALL RESPONSES TO PHQ9 QUESTIONS 1 AND 2: 0
2. FEELING DOWN, DEPRESSED, IRRITABLE, OR HOPELESS: NOT AT ALL
1. LITTLE INTEREST OR PLEASURE IN DOING THINGS: NOT AT ALL

## 2018-09-29 NOTE — DISCHARGE SUMMARY
Discharge Summary    CHIEF COMPLAINT ON ADMISSION  Chief Complaint   Patient presents with   • Foot Pain     Pt reports she got a splinter in her R foot last week that she has been unable to get out. Foot visibly swollen. No signs of redness/drainage at site.      Reason for Admission  Right foot pain and swelling    Admission Date  9/28/2018    CODE STATUS  Full code    HPI & HOSPITAL COURSE  This is a 68 y.o. female here with right foot pain and swelling after stepping on a piece of glass in her house.  She was using antibiotic ointment that she had at home but still developed worsening pain with ambulation, erythema, and edema.  She then presents to the emergency department for further evaluation.  Initial lab workup revealed an elevated CRP but was otherwise unremarkable.  Lactic acid and white blood cell count were both normal.  Upon inspection of her foot, there is only mild erythema surrounding the site, with accompanying moderate edema.  She is able to walk on her right foot but bears most of her weight on her right heel.  A foot boot that will relieve some of the pressure from doing so was provided to the patient prior to discharge.  She was treated with IV clindamycin while hospitalized, and that has since been transitioned to a 7-day course of oral clindamycin.  A lower extremity duplex was also performed and was negative for DVT.  Blood cultures are still pending and she has been advised to follow-up with her primary care physician within 1-2 weeks of discharge.  Her vital signs have remained stable and she feels comfortable going home today.    Therefore, she is discharged in good and stable condition to home with close outpatient follow-up.    Discharge Date  9/29/2018    FOLLOW UP ITEMS POST DISCHARGE  PCP within 1-2 weeks.    DISCHARGE DIAGNOSES  Principal Problem:    Cellulitis of right foot POA: Yes  Active Problems:    ADD (attention deficit disorder) POA: Yes    Hypersomnolence POA: Yes     "Insomnia POA: Yes    CKD (chronic kidney disease) stage 3, GFR 30-59 ml/min ([facility]) POA: Yes  Resolved Problems:    Elevated BUN POA: Unknown    FOLLOW UP  Future Appointments  Date Time Provider Department Center   10/18/2018 2:30 PM Binta To M.D. [facility]     Binta To M.D.  [address]  [place] [address]  [phone]    In 1 week  If symptoms worsen come to ER.    MEDICATIONS ON DISCHARGE     Medication List      START taking these medications      Instructions   clindamycin 150 MG Caps  Commonly known as:  CLEOCIN   Take 3 Caps by mouth every 6 hours for 7 days.  Dose:  450 mg        CONTINUE taking these medications      Instructions   * amphetamine-dextroamphetamine ER 30 MG XR capsule  Commonly known as:  ADDERALL XR      * amphetamine-dextroamphetamine 10 MG Tabs  Commonly known as:  ADDERALL      calcium carbonate 1250 (500 Ca) MG Tabs  Commonly known as:  OS-ELODIA 500   Take 1 Tab by mouth every day.  Dose:  1250 mg     fish oil 1000 MG Caps capsule   Take 1,000 mg by mouth every day.  Dose:  1000 mg     Ginkgo Biloba 40 MG Tabs   Take  by mouth every day.     ibuprofen 400 MG Tabs  Commonly known as:  MOTRIN   Take 1 Tab by mouth every 8 hours as needed for Moderate Pain.  Dose:  400 mg     PRISTIQ 100 MG Tb24  Generic drug:  Desvenlafaxine Succinate      zolpidem 10 MG Tabs  Commonly known as:  AMBIEN   Take 10 mg by mouth at bedtime as needed. Indications: Trouble Sleeping  Dose:  10 mg        * This list has 2 medication(s) that are the same as other medications prescribed for you. Read the directions carefully, and ask your doctor or other care provider to review them with you.              Allergies  Allergies   Allergen Reactions   • Hydrocodone      Nightmares and \"makes my skin crawl\"     DIET  Resume previous diet.    ACTIVITY  As tolerated.  Exercise encouraged.  Weight bearing as tolerated    CONSULTATIONS  None    PROCEDURES  None    LABORATORY  Lab Results "   Component Value Date    SODIUM 141 09/29/2018    POTASSIUM 3.7 09/29/2018    CHLORIDE 110 09/29/2018    CO2 21 09/29/2018    GLUCOSE 98 09/29/2018    BUN 21 09/29/2018    CREATININE 0.97 09/29/2018      Lab Results   Component Value Date    WBC 6.5 09/29/2018    HEMOGLOBIN 11.8 (L) 09/29/2018    HEMATOCRIT 35.3 (L) 09/29/2018    PLATELETCT 270 09/29/2018      Total time of the discharge process exceeds 40 minutes.

## 2018-09-29 NOTE — PROGRESS NOTES
A/o,assessment completed,poc discussed,verbalized understanding, tolerating po well,call button within reach,will continue to monitor.

## 2018-09-29 NOTE — ED NOTES
Pt reports taking her ambien pill from her home medications.  RN instructed her to not take any more medication from home while here in hospital.  Home medications removed from patient and checked into pharmacy for p/u at d/c.

## 2018-09-29 NOTE — DISCHARGE INSTRUCTIONS
Discharge Instructions    Discharged to home by car with relative. Discharged via wheelchair, hospital escort: Yes.  Special equipment needed: Not Applicable    Be sure to schedule a follow-up appointment with your primary care doctor or any specialists as instructed.     Discharge Plan:   Diet Plan: Discussed  Activity Level: Discussed  Confirmed Follow up Appointment: Patient to Call and Schedule Appointment  Confirmed Symptoms Management: Discussed  Medication Reconciliation Updated: Yes  Influenza Vaccine Indication: Patient Refuses    I understand that a diet low in cholesterol, fat, and sodium is recommended for good health. Unless I have been given specific instructions below for another diet, I accept this instruction as my diet prescription.   Other diet:     Special Instructions: None    · Is patient discharged on Warfarin / Coumadin?   No     Depression / Suicide Risk    As you are discharged from this [facility] facility, it is important to learn how to keep safe from harming yourself.    Recognize the warning signs:  · Abrupt changes in personality, positive or negative- including increase in energy   · Giving away possessions  · Change in eating patterns- significant weight changes-  positive or negative  · Change in sleeping patterns- unable to sleep or sleeping all the time   · Unwillingness or inability to communicate  · Depression  · Unusual sadness, discouragement and loneliness  · Talk of wanting to die  · Neglect of personal appearance   · Rebelliousness- reckless behavior  · Withdrawal from people/activities they love  · Confusion- inability to concentrate     If you or a loved one observes any of these behaviors or has concerns about self-harm, here's what you can do:  · Talk about it- your feelings and reasons for harming yourself  · Remove any means that you might use to hurt yourself (examples: pills, rope, extension cords, firearm)  · Get professional help from the community (Mental  Health, Substance Abuse, psychological counseling)  · Do not be alone:Call your Safe Contact- someone whom you trust who will be there for you.  · Call your local CRISIS HOTLINE 222-9631 or 315-866-9104  · Call your local Children's Mobile Crisis Response Team [place] (788) 222-8503 or www.FireScope  · Call the toll free National Suicide Prevention Hotlines   · National Suicide Prevention Lifeline 133-423-HOPJ (7785)  · National Hope Line Network 800-SUICIDE (120-4401)

## 2018-09-29 NOTE — ASSESSMENT & PLAN NOTE
Right foot cellulitis  Will admit for IV abx and monitoring for clinical improvement  Blood cultures sent   XR w/o foreign body  Check esr/crp   check dvt study

## 2018-09-29 NOTE — H&P
Hospital Medicine History & Physical Note    Date of Service  9/28/2018    Primary Care Physician  Binta To M.D.    Consultants  none    Code Status  full    Chief Complaint  Foot swelling/erythema     History of Presenting Illness  68 y.o. female who presented 9/28/2018 with right foot swelling.  Patient does have right foot swelling over the last 2 days.  Also worsening erythema and warmth to the foot.  Now she has streaking symptoms up into her calf.  She also has stepped on a piece of glass in the house and then she removed it herself.  She started using antibiotic ointment that she had at home.  She is having worsening pain with ambulation.  No alleviating factors that she can think of besides rest.  No history of diabetes.     Review of Systems  Review of Systems   Constitutional: Negative for chills and fever.   HENT: Negative for congestion, hearing loss and tinnitus.    Eyes: Negative for blurred vision, double vision and discharge.   Respiratory: Negative for cough, hemoptysis and shortness of breath.    Cardiovascular: Positive for leg swelling. Negative for chest pain and palpitations.   Gastrointestinal: Negative for abdominal pain, heartburn, nausea and vomiting.   Genitourinary: Negative for dysuria and flank pain.   Musculoskeletal: Negative for joint pain and myalgias.   Skin: Negative for rash.   Neurological: Negative for dizziness, sensory change, speech change, focal weakness and weakness.   Endo/Heme/Allergies: Negative for environmental allergies. Does not bruise/bleed easily.   Psychiatric/Behavioral: Negative for depression, hallucinations and substance abuse.       Past Medical History   has a past medical history of ADD (attention deficit disorder); Anxiety; Arthritis; CTS (carpal tunnel syndrome); Depression; History of abdominoplasty (3/15/2012); Hyperlipidemia; Hypothyroidism; Midline low back pain without sciatica (8/4/2015); Osteoporosis (8/19/2014); and PATENT FORAMEN  "OVALE LEFT TO RIGHT SHUNT (12/8/2010).    Surgical History   has a past surgical history that includes pr anesth,isha hyst fol neuraxial anal/anes; appendectomy; tubal coagulation laparoscopic bilateral; other; tonsillectomy; pr mammary ductogram, single; pr reduction of large breast; and abdominoplasty (1977).     Family History  family history includes Arthritis in her mother; Breast Cancer in her sister; Cancer in her father and mother; Cancer (age of onset: 51) in her sister; Heart Disease in her mother; Hypertension in her mother; Other in her mother; Psychiatry in her mother.     Social History   reports that she has never smoked. She has never used smokeless tobacco. She reports that she drinks alcohol. She reports that she uses drugs, including Marijuana and Methamphetamines.    Allergies  Allergies   Allergen Reactions   • Hydrocodone      Nightmares and \"makes my skin crawl\"       Medications  Prior to Admission Medications   Prescriptions Last Dose Informant Patient Reported? Taking?   PRISTIQ 100 MG TABLET SR 24 HR 9/28/2018 at 0830  Yes No   amphetamine-dextroamphetamine (ADDERALL) 10 MG Tab 9/28/2018 at 0830  Yes No   amphetamine-dextroamphetamine ER (ADDERALL XR) 30 MG XR capsule 9/28/2018 at 0830  Yes No   calcium carbonate (OS-ELODIA 500) 1250 MG TABS 9/28/2018 at 0830  No No   Sig: Take 1 Tab by mouth every day.   zolpidem (AMBIEN) 10 MG TABS 9/27/2018 at 2200 Patient Yes No   Sig: Take 10 mg by mouth at bedtime as needed. Indications: Trouble Sleeping      Facility-Administered Medications: None       Physical Exam  Temp:  [36.7 °C (98.1 °F)-37 °C (98.6 °F)] 37 °C (98.6 °F)  Pulse:  [87-96] 87  Resp:  [16] 16  BP: (140-146)/(76-79) 140/79    Physical Exam   Constitutional: She is oriented to person, place, and time. She appears well-developed and well-nourished. No distress.   HENT:   Head: Normocephalic and atraumatic.   Eyes: Pupils are equal, round, and reactive to light. Conjunctivae and EOM are " normal.   Neck: Normal range of motion. Neck supple. No JVD present.   Cardiovascular: Normal rate, regular rhythm, normal heart sounds and intact distal pulses.    No murmur heard.  Pulmonary/Chest: Effort normal and breath sounds normal. No respiratory distress. She has no wheezes.   Abdominal: Soft. Bowel sounds are normal. She exhibits no distension. There is no tenderness.   Musculoskeletal: Normal range of motion. She exhibits edema.   Warm right foot mild erythema streaking into calf   Neurological: She is alert and oriented to person, place, and time. She exhibits normal muscle tone.   Skin: Skin is warm and dry. There is erythema.   Right foot erythema and swelling   Psychiatric: She has a normal mood and affect. Her behavior is normal. Judgment and thought content normal.   Nursing note and vitals reviewed.      Laboratory:  Recent Labs      09/28/18 2032   WBC  7.7   RBC  3.97*   HEMOGLOBIN  12.2   HEMATOCRIT  35.2*   MCV  88.7   MCH  30.7   MCHC  34.7   RDW  42.0   PLATELETCT  260   MPV  9.4     Recent Labs      09/28/18 2032   SODIUM  141   POTASSIUM  3.8   CHLORIDE  108   CO2  24   GLUCOSE  96   BUN  27*   CREATININE  1.08   CALCIUM  10.2     Recent Labs      09/28/18 2032   ALTSGPT  9   ASTSGOT  15   ALKPHOSPHAT  57   TBILIRUBIN  0.4   GLUCOSE  96                 No results for input(s): TROPONINI in the last 72 hours.    Urinalysis:    No results found     Imaging:  DX-FOOT-COMPLETE 3+ RIGHT   Final Result      1.  No acute fracture or dislocation is noted.   2.  No radiopaque foreign bodies identified.   3.  Diffuse soft tissue swelling.   4.  Mild degenerative changes.      US-EXTREMITY VENOUS LOWER BILAT    (Results Pending)         Assessment/Plan:  I anticipate this patient is appropriate for observation status at this time.    * Cellulitis of right foot   Assessment & Plan    Right foot cellulitis  Will admit for IV abx and monitoring for clinical improvement  Blood cultures sent   XR w/o  foreign body  Check esr/crp   check dvt study           Hypersomnolence- (present on admission)   Assessment & Plan    On adderall will continue for now        ADD (attention deficit disorder)- (present on admission)   Assessment & Plan    Is on adderall at home will continue for now        Insomnia   Assessment & Plan    Likely due to adderall usage, now on ambien         Elevated BUN   Assessment & Plan    IVF and monitor             VTE prophylaxis: heparin

## 2018-09-29 NOTE — PROGRESS NOTES
Pt awake,a&ox4,pt staes she feels better,ambulates steady,no c/o pain,no redness noticed,pt awaiting for ortho boot,explained the need for stool sample,and explained to call staff if she is having a BM.

## 2018-09-29 NOTE — ED PROVIDER NOTES
ED Provider Note    Scribed for Delaney Dai M.D. by Marty Escobedo. 9/28/2018, 8:04 PM.    Primary care provider: Binta To M.D.  Means of arrival: Walk in  History obtained from: Patient  History limited by: None    CHIEF COMPLAINT  Chief Complaint   Patient presents with   • Foot Pain     Pt reports she got a splinter in her R foot last week that she has been unable to get out. Foot visibly swollen. No signs of redness/drainage at site.        HPI  Kristen Carrera is a 68 y.o. female who presents to the Emergency Department for evaluation of right foot swelling onset 2 days ago. The patient confirms right foot pain. Patient also reports a warm feeling in her foot. She denies any fever or chills. About one week ago, she stepped on sliver of glass in the house and got some out, but there was still some in her foot. She then applied some antibiotic ointment that she already had. She denies any history of diabetes or hypertension. Her last tetanus injection was 1 month ago. Putting pressure on the foot exacerbates her pain, but nothing alleviates it.    REVIEW OF SYSTEMS  Review of Systems   Constitutional: Negative for chills and fever.   Musculoskeletal:        Right foot pain and swelling   All other systems reviewed and are negative.      PAST MEDICAL HISTORY   has a past medical history of ADD (attention deficit disorder); Anxiety; Arthritis; CTS (carpal tunnel syndrome); Depression; History of abdominoplasty (3/15/2012); Hyperlipidemia; Hypothyroidism; Midline low back pain without sciatica (8/4/2015); Osteoporosis (8/19/2014); and PATENT FORAMEN OVALE LEFT TO RIGHT SHUNT (12/8/2010).    SURGICAL HISTORY   has a past surgical history that includes anesth,isha hyst fol neuraxial anal/anes; appendectomy; tubal coagulation laparoscopic bilateral; other; tonsillectomy; mammary ductogram, single; reduction of large breast; and abdominoplasty (1977).    SOCIAL HISTORY  Social History   Substance Use  "Topics   • Smoking status: Never Smoker   • Smokeless tobacco: Never Used   • Alcohol use Yes      Comment: rare      History   Drug Use   • Types: Marijuana, Methamphetamines     Comment: marijuana several times a year, H/O METHAMPHETAMINE FOR 17 YRS.  QUIT 2008.       FAMILY HISTORY  Family History   Problem Relation Age of Onset   • Hypertension Mother    • Cancer Mother         UTERUS   • Heart Disease Mother         Arteriosclerotic Cardiovascular Disease   • Other Mother         Coronary Artery Bypass Graft   • Psychiatry Mother    • Arthritis Mother    • Cancer Father         PANCREATIC   • Cancer Sister 51        BREAST   • Breast Cancer Sister    • Stroke Neg Hx    • Diabetes Neg Hx    • Lung Disease Neg Hx        CURRENT MEDICATIONS  Reviewed.  See Encounter Summary.     ALLERGIES  Allergies   Allergen Reactions   • Hydrocodone      Nightmares and \"makes my skin crawl\"       PHYSICAL EXAM  VITAL SIGNS: /76   Pulse 96   Temp 36.7 °C (98.1 °F)   Resp 16   Ht 1.575 m (5' 2\")   Wt 66.7 kg (147 lb 0.8 oz)   SpO2 95%   BMI 26.90 kg/m²   Constitutional: Alert in no apparent distress.  HENT: Normocephalic, Atraumatic, Bilateral external ears normal. Nose normal.   Eyes: Pupils are equal and reactive. Conjunctiva normal, non-icteric.   Heart: Regular rate and rhythm, no murmurs.    Lungs: Clear to auscultation bilaterally.  Skin: Warm, Dry, No rash. Erythema over the dorsum of the right foot with lymphatic streaking. There is a small eschar without a palpable foreign body or fluctuance to the plantar surface of the right foot. Erythema and edema present on the plantar surface as well.  Neurologic: Alert, Grossly non-focal.   Psychiatric: Affect normal, Judgment normal, Mood normal, Appears appropriate and not intoxicated.    DIAGNOSTIC STUDIES / PROCEDURES     LABS  Labs Reviewed   CBC WITH DIFFERENTIAL - Abnormal; Notable for the following:        Result Value    RBC 3.97 (*)     Hematocrit 35.2 (*)  "    All other components within normal limits   COMP METABOLIC PANEL - Abnormal; Notable for the following:     Bun 27 (*)     Globulin 3.9 (*)     All other components within normal limits   ESTIMATED GFR - Abnormal; Notable for the following:     GFR If Non  50 (*)     All other components within normal limits   LACTIC ACID   DIFFERENTIAL MANUAL   PERIPHERAL SMEAR REVIEW   PLATELET ESTIMATE   MORPHOLOGY   CBC WITH DIFFERENTIAL   COMP METABOLIC PANEL   TSH   LIPID PROFILE   HEMOGLOBIN A1C   WESTERGREN SED RATE   CRP QUANTITIVE (NON-CARDIAC)     All labs were reviewed by me.    RADIOLOGY  DX-FOOT-COMPLETE 3+ RIGHT   Final Result      1.  No acute fracture or dislocation is noted.   2.  No radiopaque foreign bodies identified.   3.  Diffuse soft tissue swelling.   4.  Mild degenerative changes.      US-EXTREMITY VENOUS LOWER BILAT    (Results Pending)     The radiologist's interpretation of all radiological studies have been reviewed by me.    COURSE & MEDICAL DECISION MAKING  Nursing notes, VS, PMSFHx reviewed in chart.    8:04 PM - Patient seen and examined at bedside. Patient will be treated with Cleocin 600 mg. Ordered DX right foot, CBC, CMP, and lactic acid to evaluate her symptoms. I informed her that I am concerned that her infection is spreading up her leg and it needs to be treated. Differentials include, but are not limited to: abscess, cellulitis, retained foreign body.    8:32 PM Estimated GFR, differential manual, peripheral smear, platelet estimate, and morphology ordered.    10:40 PM The patient was reevaluated and found to be doing well. She was informed of her lab and imagining results, as well as that she will be admitted for further evaluation. The patient understands and agrees to be admitted.    10:55 PM Hospitalist paged.    11:04 PM Dr. Cardenas, Hospitalist, consulted and agrees to admit the patient.    Decision Making:  This is a 68 y.o. year old female who presents with evidence  of infection where she is stepped on glass approximately 1 week before.  On my initial evaluation, the patient was well-appearing with normal vital signs.  She had significant erythema, warmth, and lymphatic streaking up the right lower extremity consistent with severe cellulitis in the setting of possible foreign body.  X-ray was obtained showing no acute fracture or dislocation and no radiopaque foreign body.  I discussed with the patient that I did not feel comfortable attempting to remove pieces of glass especially small ones given that they may not be present.  Patient does appear to have severe cellulitis of the right foot, and feel that she would benefit from IV antibiotics.  Discussed with the patient the possibility of discharge with oral antibiotics, and she did not feel comfortable with this.  Patient will be admitted for further evaluation and IV antibiotic treatment of her foot infection.      DISPOSITION:  Patient will be admitted to Dr. Cardenas, Hospitalist in guarded condition.      FINAL IMPRESSION  1. Cellulitis of foot    2. Bandemia          Marty TOMLIN (Scribe), am scribing for, and in the presence of, Delaney Dai M.D..    Electronically signed by: Marty Escobedo (Scribe), 9/28/2018    Delaney TOMLIN M.D. personally performed the services described in this documentation, as scribed by Marty Escobedo in my presence, and it is both accurate and complete. C    The note accurately reflects work and decisions made by me.  Delaney Dai  9/29/2018  2:54 AM

## 2018-09-29 NOTE — ED TRIAGE NOTES
"Chief Complaint   Patient presents with   • Foot Pain     Pt reports she got a splinter in her R foot last week that she has been unable to get out. Foot visibly swollen. No signs of redness/drainage at site.      Blood pressure 146/76, pulse 96, temperature 36.7 °C (98.1 °F), resp. rate 16, height 1.575 m (5' 2\"), weight 66.7 kg (147 lb 0.8 oz), SpO2 95 %, not currently breastfeeding.      Pt ambulatory to triage for above. Returned to [facility], educated on triage process, instructed to notify staff of worsening symptoms/concerns.   "

## 2018-10-18 ENCOUNTER — OFFICE VISIT (OUTPATIENT)
Dept: MEDICAL GROUP | Facility: MEDICAL CENTER | Age: 68
End: 2018-10-18
Attending: INTERNAL MEDICINE
Payer: MEDICARE

## 2018-10-18 VITALS
RESPIRATION RATE: 16 BRPM | TEMPERATURE: 99.4 F | SYSTOLIC BLOOD PRESSURE: 140 MMHG | HEART RATE: 96 BPM | OXYGEN SATURATION: 96 % | WEIGHT: 145.3 LBS | BODY MASS INDEX: 26.74 KG/M2 | HEIGHT: 62 IN | DIASTOLIC BLOOD PRESSURE: 98 MMHG

## 2018-10-18 DIAGNOSIS — Z09 HOSPITAL DISCHARGE FOLLOW-UP: ICD-10-CM

## 2018-10-18 DIAGNOSIS — G89.29 CHRONIC MIDLINE LOW BACK PAIN WITHOUT SCIATICA: ICD-10-CM

## 2018-10-18 DIAGNOSIS — M54.50 CHRONIC MIDLINE LOW BACK PAIN WITHOUT SCIATICA: ICD-10-CM

## 2018-10-18 DIAGNOSIS — R51.9 NONINTRACTABLE HEADACHE, UNSPECIFIED CHRONICITY PATTERN, UNSPECIFIED HEADACHE TYPE: ICD-10-CM

## 2018-10-18 DIAGNOSIS — R03.0 ELEVATED BLOOD PRESSURE READING: ICD-10-CM

## 2018-10-18 PROBLEM — T50.905A REACTION TO SHOT: Status: RESOLVED | Noted: 2018-09-07 | Resolved: 2018-10-18

## 2018-10-18 PROCEDURE — 99212 OFFICE O/P EST SF 10 MIN: CPT | Performed by: INTERNAL MEDICINE

## 2018-10-18 PROCEDURE — 99214 OFFICE O/P EST MOD 30 MIN: CPT | Performed by: INTERNAL MEDICINE

## 2018-10-18 RX ORDER — OXYCODONE HYDROCHLORIDE AND ACETAMINOPHEN 5; 325 MG/1; MG/1
1 TABLET ORAL
Qty: 10 TAB | Refills: 0 | Status: SHIPPED | OUTPATIENT
Start: 2018-10-18 | End: 2018-11-17

## 2018-10-18 RX ORDER — BUTALBITAL, ACETAMINOPHEN AND CAFFEINE 50; 325; 40 MG/1; MG/1; MG/1
1 TABLET ORAL EVERY 4 HOURS PRN
Qty: 30 TAB | Refills: 0 | Status: SHIPPED | OUTPATIENT
Start: 2018-10-18 | End: 2018-11-17

## 2018-10-18 RX ORDER — ASPIRIN 81 MG/1
81 TABLET, CHEWABLE ORAL DAILY
Qty: 100 TAB | COMMUNITY
Start: 2018-10-18 | End: 2019-10-31

## 2018-10-18 ASSESSMENT — PAIN SCALES - GENERAL: PAINLEVEL: NO PAIN

## 2018-10-18 NOTE — PROGRESS NOTES
Subjective:   Kristen Carrera is a 68 y.o. female here today for hospital discharge follow-up, chronic medical problems discussed below    Hospital discharge follow-up  Patient was recently hospitalized at [facility] from 9/27/18 to 9/28/18.  She presented with cellulitis of the foot after stepping on a piece of glass.  She was treated with IV clindamycin with significant improvement and she was discharged on oral clindamycin which she states that she completed.  She reports resolution of the pain and swelling as well as the redness of her foot.  She still feels like there is a small piece of glass stuck in the foot because she feels a bump where the initial injury was, however everything has healed well.  She did have an x-ray done in patient which did not show any foreign bodies.    Midline low back pain without sciatica  Patient reports a long history of low back pain.  She is been seen by numerous specialists in the past.  She has elected not to have any surgical intervention done.  She usually controls her pain by avoiding activities that aggravate it but occasionally states she will overdo it and have significant pain.  For these times, she likes to have a small quantity of Percocet on hand that she can take as needed.  Her last prescription was given several years ago.  She reports a prescription generally last her 3-5 months.    Elevated blood pressure reading  Patient's blood pressures again elevated in clinic today at 140/98.  She was 160/98 at her last visit.  She reports a history of normal blood pressures although she does not check regularly.  She does not have a cuff at home.  Her MRI of her brain which we did for memory loss did show microvascular ischemic changes which would be consistent with hypertension.    Nonintractable headache  Patient reports intermittent headaches.  She usually gets them at the base of her head/upper neck.  They have been a few times a month.  She has taken Fioricet for this in  "the past with good efficacy and is requesting a refill today.       Current medicines (including changes today)  Current Outpatient Prescriptions   Medication Sig Dispense Refill   • aspirin (ASA) 81 MG Chew Tab chewable tablet Take 1 Tab by mouth every day. 100 Tab    • acetaminophen/caffeine/butalbital 325-40-50 mg (FIORICET) -40 MG Tab Take 1 Tab by mouth every four hours as needed for Headache for up to 30 days. 30 Tab 0   • oxyCODONE-acetaminophen (PERCOCET) 5-325 MG Tab Take 1 Tab by mouth 1 time daily as needed for up to 30 days. 10 Tab 0   • Ginkgo Biloba 40 MG Tab Take  by mouth every day.     • Omega-3 Fatty Acids (FISH OIL) 1000 MG Cap capsule Take 1,000 mg by mouth every day.     • amphetamine-dextroamphetamine ER (ADDERALL XR) 30 MG XR capsule      • amphetamine-dextroamphetamine (ADDERALL) 10 MG Tab      • PRISTIQ 100 MG TABLET SR 24 HR      • calcium carbonate (OS-ELODIA 500) 1250 MG TABS Take 1 Tab by mouth every day. 30 Tab 3   • zolpidem (AMBIEN) 10 MG TABS Take 10 mg by mouth at bedtime as needed. Indications: Trouble Sleeping       No current facility-administered medications for this visit.      She  has a past medical history of ADD (attention deficit disorder); Anxiety; Arthritis; CTS (carpal tunnel syndrome); Depression; History of abdominoplasty (3/15/2012); Hyperlipidemia; Hypothyroidism; Midline low back pain without sciatica (8/4/2015); Osteoporosis (8/19/2014); and PATENT FORAMEN OVALE LEFT TO RIGHT SHUNT (12/8/2010).    ROS   As above in HPI     Objective:     Blood pressure 140/98, pulse 96, temperature 37.4 °C (99.4 °F), temperature source Temporal, resp. rate 16, height 1.575 m (5' 2\"), weight 65.9 kg (145 lb 4.8 oz), SpO2 96 %, not currently breastfeeding. Body mass index is 26.58 kg/m².   Physical Exam:  Constitutional: Alert, no distress.  Skin: Warm, dry, good turgor, no rashes in visible areas.  Eye: Equal, round and reactive, conjunctiva clear, lids normal.  Psych: Alert and " oriented x3, normal affect and mood.  Extrem: Right foot without evidence of infection, well-healed      Assessment and Plan:   The following treatment plan was discussed    1. Hospital discharge follow-up  Her foot is well-healed and she completed her antibiotic course.  We will continue to monitor.    2. Elevated blood pressure reading  Suspect that she has developed hypertension over time.  She is not currently on any medication.  I have asked her to start monitoring her blood pressure.  We will likely start her on medication at her next visit.  -Follow-up on blood pressure monitoring, likely start meds at next visit    3. Nonintractable headache, unspecified chronicity pattern, unspecified headache type  Rare headaches, well controlled with fioricet refilled today  - acetaminophen/caffeine/butalbital 325-40-50 mg (FIORICET) -40 MG Tab; Take 1 Tab by mouth every four hours as needed for Headache for up to 30 days.  Dispense: 30 Tab; Refill: 0    4. Chronic midline low back pain without sciatica  With rare Percocet use.  I have given her 10 tablets which needs to last her 1-2 months.  We discussed that this should not be chronic or daily.  This is to be used solely for breakthrough pain.  - oxyCODONE-acetaminophen (PERCOCET) 5-325 MG Tab; Take 1 Tab by mouth 1 time daily as needed for up to 30 days.  Dispense: 10 Tab; Refill: 0  - Consent for Opiate Prescription        Followup: Return in about 3 months (around 1/18/2019), or if symptoms worsen or fail to improve, for hypertension.

## 2018-10-18 NOTE — ASSESSMENT & PLAN NOTE
Patient reports intermittent headaches.  She usually gets them at the base of her head/upper neck.  They have been a few times a month.  She has taken Fioricet for this in the past with good efficacy and is requesting a refill today.

## 2018-10-18 NOTE — ASSESSMENT & PLAN NOTE
Patient's blood pressures again elevated in clinic today at 140/98.  She was 160/98 at her last visit.  She reports a history of normal blood pressures although she does not check regularly.  She does not have a cuff at home.  Her MRI of her brain which we did for memory loss did show microvascular ischemic changes which would be consistent with hypertension.

## 2018-10-18 NOTE — ASSESSMENT & PLAN NOTE
Patient reports a long history of low back pain.  She is been seen by numerous specialists in the past.  She has elected not to have any surgical intervention done.  She usually controls her pain by avoiding activities that aggravate it but occasionally states she will overdo it and have significant pain.  For these times, she likes to have a small quantity of Percocet on hand that she can take as needed.  Her last prescription was given several years ago.  She reports a prescription generally last her 3-5 months.

## 2018-10-18 NOTE — ASSESSMENT & PLAN NOTE
Patient was recently hospitalized at [facility] from 9/27/18 to 9/28/18.  She presented with cellulitis of the foot after stepping on a piece of glass.  She was treated with IV clindamycin with significant improvement and she was discharged on oral clindamycin which she states that she completed.  She reports resolution of the pain and swelling as well as the redness of her foot.  She still feels like there is a small piece of glass stuck in the foot because she feels a bump where the initial injury was, however everything has healed well.  She did have an x-ray done in patient which did not show any foreign bodies.

## 2018-11-02 ENCOUNTER — HOSPITAL ENCOUNTER (OUTPATIENT)
Dept: LAB | Facility: MEDICAL CENTER | Age: 68
End: 2018-11-02
Attending: INTERNAL MEDICINE
Payer: MEDICARE

## 2018-11-02 DIAGNOSIS — R41.3 MEMORY LOSS: ICD-10-CM

## 2018-11-02 DIAGNOSIS — E78.5 HYPERLIPIDEMIA, UNSPECIFIED HYPERLIPIDEMIA TYPE: ICD-10-CM

## 2018-11-02 DIAGNOSIS — M81.0 OSTEOPOROSIS, UNSPECIFIED OSTEOPOROSIS TYPE, UNSPECIFIED PATHOLOGICAL FRACTURE PRESENCE: ICD-10-CM

## 2018-11-02 LAB
25(OH)D3 SERPL-MCNC: 36 NG/ML (ref 30–100)
ALBUMIN SERPL BCP-MCNC: 4.2 G/DL (ref 3.2–4.9)
ALBUMIN/GLOB SERPL: 1.4 G/DL
ALP SERPL-CCNC: 39 U/L (ref 30–99)
ALT SERPL-CCNC: 14 U/L (ref 2–50)
ANION GAP SERPL CALC-SCNC: 8 MMOL/L (ref 0–11.9)
AST SERPL-CCNC: 18 U/L (ref 12–45)
BASOPHILS # BLD AUTO: 1 % (ref 0–1.8)
BASOPHILS # BLD: 0.06 K/UL (ref 0–0.12)
BILIRUB SERPL-MCNC: 0.5 MG/DL (ref 0.1–1.5)
BUN SERPL-MCNC: 18 MG/DL (ref 8–22)
CALCIUM SERPL-MCNC: 9.8 MG/DL (ref 8.5–10.5)
CHLORIDE SERPL-SCNC: 109 MMOL/L (ref 96–112)
CHOLEST SERPL-MCNC: 203 MG/DL (ref 100–199)
CO2 SERPL-SCNC: 27 MMOL/L (ref 20–33)
CREAT SERPL-MCNC: 0.93 MG/DL (ref 0.5–1.4)
EOSINOPHIL # BLD AUTO: 0.11 K/UL (ref 0–0.51)
EOSINOPHIL NFR BLD: 1.8 % (ref 0–6.9)
ERYTHROCYTE [DISTWIDTH] IN BLOOD BY AUTOMATED COUNT: 44.8 FL (ref 35.9–50)
FASTING STATUS PATIENT QL REPORTED: NORMAL
GLOBULIN SER CALC-MCNC: 2.9 G/DL (ref 1.9–3.5)
GLUCOSE SERPL-MCNC: 100 MG/DL (ref 65–99)
HCT VFR BLD AUTO: 37.6 % (ref 37–47)
HDLC SERPL-MCNC: 67 MG/DL
HGB BLD-MCNC: 12.4 G/DL (ref 12–16)
IMM GRANULOCYTES # BLD AUTO: 0.03 K/UL (ref 0–0.11)
IMM GRANULOCYTES NFR BLD AUTO: 0.5 % (ref 0–0.9)
LDLC SERPL CALC-MCNC: 121 MG/DL
LYMPHOCYTES # BLD AUTO: 1.28 K/UL (ref 1–4.8)
LYMPHOCYTES NFR BLD: 21.1 % (ref 22–41)
MCH RBC QN AUTO: 30.3 PG (ref 27–33)
MCHC RBC AUTO-ENTMCNC: 33 G/DL (ref 33.6–35)
MCV RBC AUTO: 91.9 FL (ref 81.4–97.8)
MONOCYTES # BLD AUTO: 0.56 K/UL (ref 0–0.85)
MONOCYTES NFR BLD AUTO: 9.2 % (ref 0–13.4)
NEUTROPHILS # BLD AUTO: 4.04 K/UL (ref 2–7.15)
NEUTROPHILS NFR BLD: 66.4 % (ref 44–72)
NRBC # BLD AUTO: 0 K/UL
NRBC BLD-RTO: 0 /100 WBC
PLATELET # BLD AUTO: 276 K/UL (ref 164–446)
PMV BLD AUTO: 10.1 FL (ref 9–12.9)
POTASSIUM SERPL-SCNC: 3.4 MMOL/L (ref 3.6–5.5)
PROT SERPL-MCNC: 7.1 G/DL (ref 6–8.2)
RBC # BLD AUTO: 4.09 M/UL (ref 4.2–5.4)
SODIUM SERPL-SCNC: 144 MMOL/L (ref 135–145)
TRIGL SERPL-MCNC: 75 MG/DL (ref 0–149)
TSH SERPL DL<=0.005 MIU/L-ACNC: 4.33 UIU/ML (ref 0.38–5.33)
WBC # BLD AUTO: 6.1 K/UL (ref 4.8–10.8)

## 2018-11-02 PROCEDURE — 80053 COMPREHEN METABOLIC PANEL: CPT

## 2018-11-02 PROCEDURE — 36415 COLL VENOUS BLD VENIPUNCTURE: CPT

## 2018-11-02 PROCEDURE — 80061 LIPID PANEL: CPT

## 2018-11-02 PROCEDURE — 84443 ASSAY THYROID STIM HORMONE: CPT

## 2018-11-02 PROCEDURE — 85025 COMPLETE CBC W/AUTO DIFF WBC: CPT

## 2018-11-02 PROCEDURE — 82306 VITAMIN D 25 HYDROXY: CPT

## 2018-11-08 ENCOUNTER — TELEPHONE (OUTPATIENT)
Dept: MEDICAL GROUP | Facility: MEDICAL CENTER | Age: 68
End: 2018-11-08

## 2018-11-08 NOTE — TELEPHONE ENCOUNTER
----- Message from Binta To M.D. sent at 11/6/2018  4:28 PM PST -----  Please let patient know labs came back showing normal thyroid, normal blood counts, normal kidney and liver function, and vitamin D at goal.  I would like her to continue her current dose of vitamin D.  Her cholesterol is a little bit high.  Based on her risk factors for heart disease which are mostly her age and the fact that her blood pressure has been high over the past few visits, we would recommend starting her on a low-dose of a cholesterol medication at this point.  I would like her to follow-up with me in the office in about 4 weeks so that we could talk about options to control her cholesterol.  In the meantime, please also let her know that I would like her to be monitoring her blood pressure at home or when she goes to the drugstore so that we can determine whether we need to start her on a low-dose of a blood pressure medicine as well.    Binta To M.D.

## 2018-11-08 NOTE — TELEPHONE ENCOUNTER
Phone Number Called: [phone] (home)     Message: Called pt. Left message to call back regarding lab results.    Left Message for patient to call back: yes

## 2018-11-08 NOTE — TELEPHONE ENCOUNTER
1. Caller Name: Kristen DOWNS Deandre                                           Call Back Number: [phone] (home)         Patient approves a detailed voicemail message: yes    Pt called. Notified of providers result notes.

## 2019-01-11 ENCOUNTER — OFFICE VISIT (OUTPATIENT)
Dept: MEDICAL GROUP | Facility: MEDICAL CENTER | Age: 69
End: 2019-01-11
Attending: INTERNAL MEDICINE
Payer: MEDICARE

## 2019-01-11 VITALS
DIASTOLIC BLOOD PRESSURE: 90 MMHG | SYSTOLIC BLOOD PRESSURE: 138 MMHG | TEMPERATURE: 101 F | HEART RATE: 86 BPM | RESPIRATION RATE: 16 BRPM | WEIGHT: 146 LBS | HEIGHT: 62 IN | BODY MASS INDEX: 26.87 KG/M2 | OXYGEN SATURATION: 100 %

## 2019-01-11 DIAGNOSIS — J20.8 ACUTE BRONCHITIS DUE TO OTHER SPECIFIED ORGANISMS: ICD-10-CM

## 2019-01-11 DIAGNOSIS — G89.29 CHRONIC MIDLINE LOW BACK PAIN WITHOUT SCIATICA: ICD-10-CM

## 2019-01-11 DIAGNOSIS — M54.50 CHRONIC MIDLINE LOW BACK PAIN WITHOUT SCIATICA: ICD-10-CM

## 2019-01-11 PROBLEM — Z09 HOSPITAL DISCHARGE FOLLOW-UP: Status: RESOLVED | Noted: 2018-10-18 | Resolved: 2019-01-11

## 2019-01-11 PROCEDURE — 99214 OFFICE O/P EST MOD 30 MIN: CPT | Performed by: INTERNAL MEDICINE

## 2019-01-11 PROCEDURE — 99213 OFFICE O/P EST LOW 20 MIN: CPT | Performed by: INTERNAL MEDICINE

## 2019-01-11 RX ORDER — DOXYCYCLINE 100 MG/1
100 CAPSULE ORAL 2 TIMES DAILY
Qty: 14 CAP | Refills: 0 | Status: SHIPPED | OUTPATIENT
Start: 2019-01-11 | End: 2019-01-18

## 2019-01-11 RX ORDER — OXYCODONE HYDROCHLORIDE AND ACETAMINOPHEN 5; 325 MG/1; MG/1
1 TABLET ORAL 2 TIMES DAILY PRN
Qty: 10 TAB | Refills: 0 | Status: SHIPPED | OUTPATIENT
Start: 2019-01-11 | End: 2019-01-18

## 2019-01-11 ASSESSMENT — PAIN SCALES - GENERAL: PAINLEVEL: 5=MODERATE PAIN

## 2019-01-11 NOTE — ASSESSMENT & PLAN NOTE
She reports a productive cough with yellow sputum, increased shortness of breath, and fevers for the past 5 days.  She states that her great grandson was recently sick and hospitalized for an upper respiratory infection and she stayed with him while he was in the hospital.  Symptoms developed after she returned home.  She did receive a flu shot this year.  She has not tried any over-the-counter medications.  She currently denies chest pain, ear pain, headaches.

## 2019-01-12 NOTE — ASSESSMENT & PLAN NOTE
She has a history of chronic low back pain for which she will rarely take Percocet.  Her last prescription for me was in October.  According to her , she filled a prescription in November but this was from her dentist and she reports not taking these pills.  She actually reports that she did not fill the prescription.  She is requesting an additional 10 tablets to have on hand which usually lasts her several months.

## 2019-01-12 NOTE — PROGRESS NOTES
Subjective:   Kristen Carrera is a 68 y.o. female here today for productive cough and feeling sick for 5 days    Acute bronchitis due to other specified organisms  She reports a productive cough with yellow sputum, increased shortness of breath, and fevers for the past 5 days.  She states that her great grandson was recently sick and hospitalized for an upper respiratory infection and she stayed with him while he was in the hospital.  Symptoms developed after she returned home.  She did receive a flu shot this year.  She has not tried any over-the-counter medications.  She currently denies chest pain, ear pain, headaches.      Midline low back pain without sciatica  She has a history of chronic low back pain for which she will rarely take Percocet.  Her last prescription for me was in October.  According to her , she filled a prescription in November but this was from her dentist and she reports not taking these pills.  She actually reports that she did not fill the prescription.  She is requesting an additional 10 tablets to have on hand which usually lasts her several months.       Current medicines (including changes today)  Current Outpatient Prescriptions   Medication Sig Dispense Refill   • doxycycline (MONODOX) 100 MG capsule Take 1 Cap by mouth 2 times a day for 7 days. 14 Cap 0   • oxyCODONE-acetaminophen (PERCOCET) 5-325 MG Tab Take 1 Tab by mouth 2 times a day as needed for Severe Pain for up to 7 days. 10 Tab 0   • aspirin (ASA) 81 MG Chew Tab chewable tablet Take 1 Tab by mouth every day. 100 Tab    • amphetamine-dextroamphetamine ER (ADDERALL XR) 30 MG XR capsule      • amphetamine-dextroamphetamine (ADDERALL) 10 MG Tab      • PRISTIQ 100 MG TABLET SR 24 HR      • zolpidem (AMBIEN) 10 MG TABS Take 10 mg by mouth at bedtime as needed. Indications: Trouble Sleeping       No current facility-administered medications for this visit.      She  has a past medical history of ADD (attention deficit disorder);  "Anxiety; Arthritis; CTS (carpal tunnel syndrome); Depression; History of abdominoplasty (3/15/2012); Hyperlipidemia; Hypothyroidism; Midline low back pain without sciatica (8/4/2015); Osteoporosis (8/19/2014); and PATENT FORAMEN OVALE LEFT TO RIGHT SHUNT (12/8/2010).    ROS   As above in HPI     Objective:     Blood pressure 138/90, pulse 86, temperature (!) 38.3 °C (101 °F), temperature source Temporal, resp. rate 16, height 1.575 m (5' 2.01\"), weight 66.2 kg (146 lb), SpO2 100 %, not currently breastfeeding. Body mass index is 26.7 kg/m².   Physical Exam:  Constitutional: Alert, no distress.  Skin: Warm, dry, good turgor, no rashes in visible areas.  Eye: Equal, round and reactive, conjunctiva clear, lids normal.  ENMT: Lips without lesions, good dentition, oropharynx clear, TM's clear bilaterally but bulging left TM, no tenderness to palpation over frontal or maxillary sinuses bilaterally  Neck: Trachea midline, no masses, no thyromegaly. No cervical or supraclavicular lymphadenopathy  Respiratory: Unlabored respiratory effort, lungs clear to auscultation, no wheezes, no ronchi.  Cardiovascular: Regular rate and rhythm, no murmurs appreciated    Results and Imaging:   POC influenza in clinic today was negative    Assessment and Plan:   The following treatment plan was discussed    1. Acute bronchitis due to other specified organisms  Given fevers, productive cough, negative influenza test which shortness of breath and febrile in clinic today, have elected to treat for bronchitis/community-acquired pneumonia with doxycycline.  Patient was advised that if no improvement or worsening of symptoms over the weekend that she should present to the ER or urgent care for further evaluation.  She will let us know if she is still feeling unwell by this time next week.  - doxycycline (MONODOX) 100 MG capsule; Take 1 Cap by mouth 2 times a day for 7 days.  Dispense: 14 Cap; Refill: 0  - POCT Influenza A/B    2. Chronic midline " low back pain without sciatica  I have refilled a small supply of her Percocet to be taken as needed.  - oxyCODONE-acetaminophen (PERCOCET) 5-325 MG Tab; Take 1 Tab by mouth 2 times a day as needed for Severe Pain for up to 7 days.  Dispense: 10 Tab; Refill: 0        Followup: Return if symptoms worsen or fail to improve.

## 2019-06-26 ENCOUNTER — HOSPITAL ENCOUNTER (EMERGENCY)
Facility: MEDICAL CENTER | Age: 69
End: 2019-06-26
Attending: EMERGENCY MEDICINE
Payer: MEDICARE

## 2019-06-26 VITALS
DIASTOLIC BLOOD PRESSURE: 85 MMHG | WEIGHT: 147.93 LBS | SYSTOLIC BLOOD PRESSURE: 145 MMHG | OXYGEN SATURATION: 95 % | RESPIRATION RATE: 17 BRPM | HEIGHT: 62 IN | HEART RATE: 86 BPM | BODY MASS INDEX: 27.22 KG/M2 | TEMPERATURE: 98 F

## 2019-06-26 DIAGNOSIS — H02.843 EYELID EDEMA, RIGHT: ICD-10-CM

## 2019-06-26 DIAGNOSIS — R51.9 RIGHT FACIAL PAIN: ICD-10-CM

## 2019-06-26 DIAGNOSIS — L03.213 PRESEPTAL CELLULITIS OF RIGHT EYE: ICD-10-CM

## 2019-06-26 LAB
ANION GAP SERPL CALC-SCNC: 8 MMOL/L (ref 0–11.9)
BASOPHILS # BLD AUTO: 0.6 % (ref 0–1.8)
BASOPHILS # BLD: 0.04 K/UL (ref 0–0.12)
BUN SERPL-MCNC: 21 MG/DL (ref 8–22)
CALCIUM SERPL-MCNC: 9.9 MG/DL (ref 8.5–10.5)
CHLORIDE SERPL-SCNC: 109 MMOL/L (ref 96–112)
CO2 SERPL-SCNC: 25 MMOL/L (ref 20–33)
CREAT SERPL-MCNC: 0.92 MG/DL (ref 0.5–1.4)
EOSINOPHIL # BLD AUTO: 0.1 K/UL (ref 0–0.51)
EOSINOPHIL NFR BLD: 1.6 % (ref 0–6.9)
ERYTHROCYTE [DISTWIDTH] IN BLOOD BY AUTOMATED COUNT: 45.6 FL (ref 35.9–50)
GLUCOSE SERPL-MCNC: 97 MG/DL (ref 65–99)
HCT VFR BLD AUTO: 38.2 % (ref 37–47)
HGB BLD-MCNC: 12.5 G/DL (ref 12–16)
IMM GRANULOCYTES # BLD AUTO: 0.02 K/UL (ref 0–0.11)
IMM GRANULOCYTES NFR BLD AUTO: 0.3 % (ref 0–0.9)
LYMPHOCYTES # BLD AUTO: 1.3 K/UL (ref 1–4.8)
LYMPHOCYTES NFR BLD: 20.3 % (ref 22–41)
MCH RBC QN AUTO: 29.8 PG (ref 27–33)
MCHC RBC AUTO-ENTMCNC: 32.7 G/DL (ref 33.6–35)
MCV RBC AUTO: 91.2 FL (ref 81.4–97.8)
MONOCYTES # BLD AUTO: 0.57 K/UL (ref 0–0.85)
MONOCYTES NFR BLD AUTO: 8.9 % (ref 0–13.4)
NEUTROPHILS # BLD AUTO: 4.38 K/UL (ref 2–7.15)
NEUTROPHILS NFR BLD: 68.3 % (ref 44–72)
NRBC # BLD AUTO: 0 K/UL
NRBC BLD-RTO: 0 /100 WBC
PLATELET # BLD AUTO: 240 K/UL (ref 164–446)
PMV BLD AUTO: 9.4 FL (ref 9–12.9)
POTASSIUM SERPL-SCNC: 4.2 MMOL/L (ref 3.6–5.5)
RBC # BLD AUTO: 4.19 M/UL (ref 4.2–5.4)
SODIUM SERPL-SCNC: 142 MMOL/L (ref 135–145)
WBC # BLD AUTO: 6.4 K/UL (ref 4.8–10.8)

## 2019-06-26 PROCEDURE — A9270 NON-COVERED ITEM OR SERVICE: HCPCS | Performed by: EMERGENCY MEDICINE

## 2019-06-26 PROCEDURE — 700111 HCHG RX REV CODE 636 W/ 250 OVERRIDE (IP): Performed by: EMERGENCY MEDICINE

## 2019-06-26 PROCEDURE — 99284 EMERGENCY DEPT VISIT MOD MDM: CPT

## 2019-06-26 PROCEDURE — 700101 HCHG RX REV CODE 250: Performed by: EMERGENCY MEDICINE

## 2019-06-26 PROCEDURE — 85025 COMPLETE CBC W/AUTO DIFF WBC: CPT

## 2019-06-26 PROCEDURE — 700102 HCHG RX REV CODE 250 W/ 637 OVERRIDE(OP): Performed by: EMERGENCY MEDICINE

## 2019-06-26 PROCEDURE — 80048 BASIC METABOLIC PNL TOTAL CA: CPT

## 2019-06-26 PROCEDURE — 36415 COLL VENOUS BLD VENIPUNCTURE: CPT

## 2019-06-26 RX ORDER — SULFAMETHOXAZOLE AND TRIMETHOPRIM 800; 160 MG/1; MG/1
1 TABLET ORAL 2 TIMES DAILY
Qty: 14 TAB | Refills: 0 | Status: SHIPPED | OUTPATIENT
Start: 2019-06-26 | End: 2019-07-03

## 2019-06-26 RX ORDER — AMOXICILLIN AND CLAVULANATE POTASSIUM 875; 125 MG/1; MG/1
1 TABLET, FILM COATED ORAL 2 TIMES DAILY
Qty: 14 TAB | Refills: 0 | Status: SHIPPED | OUTPATIENT
Start: 2019-06-26 | End: 2019-07-03

## 2019-06-26 RX ORDER — SULFAMETHOXAZOLE AND TRIMETHOPRIM 800; 160 MG/1; MG/1
1 TABLET ORAL ONCE
Status: COMPLETED | OUTPATIENT
Start: 2019-06-26 | End: 2019-06-26

## 2019-06-26 RX ORDER — DIPHENHYDRAMINE HCL 25 MG
25 TABLET ORAL ONCE
Status: COMPLETED | OUTPATIENT
Start: 2019-06-26 | End: 2019-06-26

## 2019-06-26 RX ORDER — PREDNISONE 20 MG/1
40 TABLET ORAL ONCE
Status: COMPLETED | OUTPATIENT
Start: 2019-06-26 | End: 2019-06-26

## 2019-06-26 RX ORDER — TETRACAINE HYDROCHLORIDE 5 MG/ML
1 SOLUTION OPHTHALMIC ONCE
Status: DISCONTINUED | OUTPATIENT
Start: 2019-06-26 | End: 2019-06-26

## 2019-06-26 RX ORDER — PROPARACAINE HYDROCHLORIDE 5 MG/ML
1 SOLUTION/ DROPS OPHTHALMIC ONCE
Status: COMPLETED | OUTPATIENT
Start: 2019-06-26 | End: 2019-06-26

## 2019-06-26 RX ORDER — AMOXICILLIN AND CLAVULANATE POTASSIUM 875; 125 MG/1; MG/1
1 TABLET, FILM COATED ORAL ONCE
Status: COMPLETED | OUTPATIENT
Start: 2019-06-26 | End: 2019-06-26

## 2019-06-26 RX ORDER — DIPHENHYDRAMINE HCL 25 MG
25 CAPSULE ORAL EVERY 6 HOURS PRN
Qty: 30 CAP | Refills: 0 | Status: SHIPPED | OUTPATIENT
Start: 2019-06-26 | End: 2019-07-03

## 2019-06-26 RX ADMIN — DIPHENHYDRAMINE HCL 25 MG: 25 TABLET ORAL at 11:37

## 2019-06-26 RX ADMIN — AMOXICILLIN AND CLAVULANATE POTASSIUM 1 TABLET: 875; 125 TABLET, FILM COATED ORAL at 11:37

## 2019-06-26 RX ADMIN — PREDNISONE 40 MG: 20 TABLET ORAL at 11:37

## 2019-06-26 RX ADMIN — PROPARACAINE HYDROCHLORIDE 1 DROP: 5 SOLUTION/ DROPS OPHTHALMIC at 11:37

## 2019-06-26 RX ADMIN — SULFAMETHOXAZOLE AND TRIMETHOPRIM 1 TABLET: 800; 160 TABLET ORAL at 11:37

## 2019-06-26 NOTE — ED PROVIDER NOTES
ED Provider Note    Scribed for Lul Sneed M.D. by Kassandra Marks. 6/26/2019  11:12 AM    CHIEF COMPLAINT  Chief Complaint   Patient presents with   • Eye Swelling     woke up with right eye swollen this morning       HPI  Kristen Carrera is a 68 y.o. female who presents to the ED with acute swelling to the right eye onset this morning. The patient state that she woke up with substantial swelling to the right eye and did not have any swelling to her eye prior to going to sleep last night. Associated mild pain, warmth and redness to the right eye. Negative fever, chills, hearing loss, vision loss, discharge or pain exacerbation with movement to the right eye. She states that she wear prescription contacts and is currently wearing her contacts. The patient denies wearing any eye makeup within the past 24 hours. Past medical history includes anxiety, arthritis, carpel tunnel syndrome, depression, hyperlipidemia and hypothyroidism. She is allergic to hydrocodone.    REVIEW OF SYSTEMS  Constitutional: No fevers, chills, or recent illness.  Skin: No rashes or diaphoresis.  Eyes: Mild pain, warm and redness to the right eye. No discharge or pain to the eye with movement.   ENT: as above, No hearing change. No rhinorrhea or nasal congestion, no ST or difficulty swallowing.  Respiratory: No SOB. No coughing or hemoptysis. No Wheezing.  Cardiac: No CP, palpitations, edema. No PND or orthopnea.  GI: No Abdominal Pain; N/V; diarrhea, constipation. No blood in stool.  : No dysuria. No D/C. No frequency or urgency. No hesitancy.   MSK: No pain in joints or muscles. No calf pain or swelling.  Neuro: No HA or paresthesias. No focal weakness.  Psych: No SI, HI, AH, VH.  Endocrine: No polyuria or polydipsia. No heat or cold intolerance.  Heme: No easy bruising. No history of bleeding disorders or anemia.  See HPI for further details. All other systems are  "negative.       PAST MEDICAL HISTORY  The patient has a past medical history of ADD (attention deficit disorder); Anxiety; Arthritis; CTS (carpal tunnel syndrome); Depression; History of abdominoplasty (3/15/2012); Hyperlipidemia; Hypothyroidism; Midline low back pain without sciatica (8/4/2015); Osteoporosis (8/19/2014); and PATENT FORAMEN OVALE LEFT TO RIGHT SHUNT (12/8/2010).    SOCIAL HISTORY  Social History     Social History Main Topics   • Smoking status: Never Smoker   • Smokeless tobacco: Never Used   • Alcohol use Yes      Comment: rare   • Drug use: Yes     Types: Marijuana, Methamphetamines      Comment: marijuana several times a year, H/O METHAMPHETAMINE FOR 17 YRS.  QUIT 2008.   • Sexual activity: Not Currently     Birth control/ protection: Post-Menopausal       SURGICAL HISTORY  The patient has a past surgical history that includes anesth,isha hyst fol neuraxial anal/anes; appendectomy; tubal coagulation laparoscopic bilateral; other; tonsillectomy; mammary ductogram, single; reduction of large breast; and abdominoplasty (1977).    CURRENT MEDICATIONS    Current Outpatient Prescriptions:   •  aspirin (ASA) 81 MG Chew Tab chewable tablet, Take 1 Tab by mouth every day., Disp: 100 Tab, Rfl:   •  amphetamine-dextroamphetamine ER (ADDERALL XR) 30 MG XR capsule, , Disp: , Rfl:   •  amphetamine-dextroamphetamine (ADDERALL) 10 MG Tab, , Disp: , Rfl:   •  PRISTIQ 100 MG TABLET SR 24 HR, , Disp: , Rfl:   •  zolpidem (AMBIEN) 10 MG TABS, Take 10 mg by mouth at bedtime as needed. Indications: Trouble Sleeping, Disp: , Rfl:     ALLERGIES  Allergies   Allergen Reactions   • Hydrocodone      Nightmares and \"makes my skin crawl\"       PHYSICAL EXAM  VITAL SIGNS: /87   Pulse 91   Temp 36.6 °C (97.8 °F) (Temporal)   Resp 18   Ht 1.575 m (5' 2\")   Wt 67.1 kg (147 lb 14.9 oz)   SpO2 94%   BMI 27.06 kg/m²    Pulse ox interpretation: I interpret this pulse ox as normal.  Genl: Female sitting in chair " comfortably, speaking clearly, appears in no acute distress   Head: NC/AT. No scalp tenderness, no midface tenderness  ENT: Mucous membranes moist, posterior pharynx clear, uvula midline, nares patent bilaterally, Tympanic membranes are normal bilaterally, posterior oropharynx unremarkable  Eyes: Edema and hyperemia of the superior and lower lids of the right side, pupils equal round reactive to light. Full Range of motion of the eyes. No scleral injections, no vision changes, no discharge at the lid margins, no poptosis.   Neck: Supple, FROM, no LAD appreciated. No cervical tenderness.   Pulmonary: Lungs are clear to auscultation bilaterally  Chest: No TTP  CV:  RRR, no murmur appreciated, pulses 2+ in both upper and lower extremities,  Abdomen: soft, NT/ND; no rebound/guarding, no masses palpated, no HSM   : no CVA or suprapubic tenderness   Musculoskeletal: Pain free ROM of the neck. Moving upper and lower extremities and spontaneous in coordinated fashion  Neuro: A&Ox4 (person, place, time, situation), speech fluent, gait steady, no focal deficits appreciated, No cerebellar signs. Sensation is grossly intact in the distal upper and lower extremities  5/5 strength in  and dorsiflexion/plantar flexion of the ankles  Psych: Patient has an appropriate affect and behavior  Skin: No rash or lesions.  No pallor or jaundice.  No cyanosis.  Warm and dry.     DIAGNOSTIC STUDIES / PROCEDURES    LABS  Results for orders placed or performed during the hospital encounter of 06/26/19   CBC WITH DIFFERENTIAL   Result Value Ref Range    WBC 6.4 4.8 - 10.8 K/uL    RBC 4.19 (L) 4.20 - 5.40 M/uL    Hemoglobin 12.5 12.0 - 16.0 g/dL    Hematocrit 38.2 37.0 - 47.0 %    MCV 91.2 81.4 - 97.8 fL    MCH 29.8 27.0 - 33.0 pg    MCHC 32.7 (L) 33.6 - 35.0 g/dL    RDW 45.6 35.9 - 50.0 fL    Platelet Count 240 164 - 446 K/uL    MPV 9.4 9.0 - 12.9 fL    Neutrophils-Polys 68.30 44.00 - 72.00 %    Lymphocytes 20.30 (L) 22.00 - 41.00 %     Monocytes 8.90 0.00 - 13.40 %    Eosinophils 1.60 0.00 - 6.90 %    Basophils 0.60 0.00 - 1.80 %    Immature Granulocytes 0.30 0.00 - 0.90 %    Nucleated RBC 0.00 /100 WBC    Neutrophils (Absolute) 4.38 2.00 - 7.15 K/uL    Lymphs (Absolute) 1.30 1.00 - 4.80 K/uL    Monos (Absolute) 0.57 0.00 - 0.85 K/uL    Eos (Absolute) 0.10 0.00 - 0.51 K/uL    Baso (Absolute) 0.04 0.00 - 0.12 K/uL    Immature Granulocytes (abs) 0.02 0.00 - 0.11 K/uL    NRBC (Absolute) 0.00 K/uL   BASIC METABOLIC PANEL   Result Value Ref Range    Sodium 142 135 - 145 mmol/L    Potassium 4.2 3.6 - 5.5 mmol/L    Chloride 109 96 - 112 mmol/L    Co2 25 20 - 33 mmol/L    Glucose 97 65 - 99 mg/dL    Bun 21 8 - 22 mg/dL    Creatinine 0.92 0.50 - 1.40 mg/dL    Calcium 9.9 8.5 - 10.5 mg/dL    Anion Gap 8.0 0.0 - 11.9   ESTIMATED GFR   Result Value Ref Range    GFR If African American >60 >60 mL/min/1.73 m 2    GFR If Non African American >60 >60 mL/min/1.73 m 2         COURSE & MEDICAL DECISION MAKING  Pertinent Labs & Imaging studies reviewed. (See chart for details)    Differential diagnoses include but not limited to: orbital cellulitis, preseptal cellulitis, allergic reaction, insect envenomation, contact dermatitis, facial cellulitis    11:12 AM - Patient seen and examined at bedside. Plan of care was discussed with patient who verbalizes her understanding and agreement. Patient will be treated with benadryl 25 mg, Augmentin 875-125 mg, bactrim 800-160 mg, deltasone 40 mg, pontocaine 0.5% opthalmic solution. Ordered CBC with differential, BMP to evaluate her symptoms.     11:35 AM- Administered one drop of Opthaine 0.5% ophthalmic solution into patient's right eye.  The patient's prescription contact lens were removed without any complications.  L20/30,R20/40,B20/30    11:40 AM-  Recheck: Patient re-evaluated at beside. Patient reports reduced swelling to the right eye. Discussed patient's condition and treatment plan. Patient's lab results discussed.  The patient understood and is in agreement. She will be discharged home with opthaine 0.%opth    Medical Decision Making:   Presents emergency room for the symptoms as described above.  She has clear hyperemia and edema around the right involving both legs with no active drainage.  She denies any fevers or clinical signs of infectious etiology mouth, ears.  She describes possible insect envenomation versus contact dermatitis of the affected lids.  She has no proptosis, no pain with ocular movement and no scleral injection or signs of endophthalmitis or posterior orbital cellulitis.  Patient's contact lenses removed and she continues to be pain-free with regards to her eye.  Bedside visual acuity is normal.  There are no clinical signs of orbital cellulitis, thus at this point with normal vital signs there is no indication for max of facial or orbital CT.  Labs and electrolytes were obtained and the patient is medicated with Bactrim, Augmentin, Benadryl, steroids.  While it is likely a mix of superficial cellulitis and edema from hyperemic event, the patient is counseled regarding the signs of progression of cellulitis, the need for reevaluation should she have any pain with eye movements.  Overall swelling is improved on follow up evaluation and pt is clear for outpt therapy.  Questions addressed and she is discharged home in stable condition.    The patient will not drink alcohol nor drive with prescribed medications. The patient will return for worsening symptoms and is stable at the time of discharge. The patient verbalizes understanding and will comply.     The patient will return for new or worsening symptoms and is stable at the time of discharge.    The patient is referred to a primary physician for blood pressure management, diabetic screening, and for all other preventative health concerns.    DISPOSITION:  Patient will be discharged home in stable condition.    FOLLOW UP:  Binta To M.D.  [address]  [address]  [place] [address]  [phone]    Schedule an appointment as soon as possible for a visit in 1 week      [facility], Emergency Dept  [address] [facility]  [place] [id number] [phone]    As needed, If symptoms worsen      OUTPATIENT MEDICATIONS:  New Prescriptions    AMOXICILLIN-CLAVULANATE (AUGMENTIN) 875-125 MG TAB    Take 1 Tab by mouth 2 times a day for 7 days.    DIPHENHYDRAMINE (BENADRYL) 25 MG CAPSULE    Take 1 Cap by mouth every 6 hours as needed for Itching or Rash for up to 7 days.    SULFAMETHOXAZOLE-TRIMETHOPRIM (BACTRIM DS) 800-160 MG TABLET    Take 1 Tab by mouth 2 times a day for 7 days.       FINAL IMPRESSION  Visit Diagnoses     ICD-10-CM   1. Eyelid edema, right H02.843   2. Right facial pain R51   3. Preseptal cellulitis of right eye L03.213      Kassandra TOMLIN (Lou), am scribing for, and in the presence of, Lul Sneed M.D..    Electronically signed by: Kassandra Marks (Deenaibe), 6/26/2019    ILul M.D. personally performed the services described in this documentation, as scribed by Kassandra Marks in my presence, and it is both accurate and complete.    The note accurately reflects work and decisions made by me.  Lul Sneed  6/26/2019  12:51 PM

## 2019-10-31 ENCOUNTER — OFFICE VISIT (OUTPATIENT)
Dept: URGENT CARE | Facility: CLINIC | Age: 69
End: 2019-10-31
Payer: MEDICARE

## 2019-10-31 VITALS
RESPIRATION RATE: 12 BRPM | OXYGEN SATURATION: 97 % | SYSTOLIC BLOOD PRESSURE: 144 MMHG | BODY MASS INDEX: 27.68 KG/M2 | TEMPERATURE: 99.6 F | HEART RATE: 94 BPM | DIASTOLIC BLOOD PRESSURE: 92 MMHG | HEIGHT: 62 IN | WEIGHT: 150.4 LBS

## 2019-10-31 DIAGNOSIS — H10.33 ACUTE BACTERIAL CONJUNCTIVITIS OF BOTH EYES: ICD-10-CM

## 2019-10-31 DIAGNOSIS — J01.40 ACUTE NON-RECURRENT PANSINUSITIS: ICD-10-CM

## 2019-10-31 DIAGNOSIS — H66.012 NON-RECURRENT ACUTE SUPPURATIVE OTITIS MEDIA OF LEFT EAR WITH SPONTANEOUS RUPTURE OF TYMPANIC MEMBRANE: ICD-10-CM

## 2019-10-31 PROCEDURE — 99214 OFFICE O/P EST MOD 30 MIN: CPT | Performed by: PHYSICIAN ASSISTANT

## 2019-10-31 RX ORDER — MOXIFLOXACIN 5 MG/ML
1 SOLUTION/ DROPS OPHTHALMIC 3 TIMES DAILY
Qty: 1 BOTTLE | Refills: 0 | Status: SHIPPED | OUTPATIENT
Start: 2019-10-31 | End: 2019-11-02

## 2019-10-31 RX ORDER — DOXYCYCLINE HYCLATE 100 MG
100 TABLET ORAL 2 TIMES DAILY
Qty: 14 TAB | Refills: 0 | Status: SHIPPED | OUTPATIENT
Start: 2019-10-31 | End: 2019-11-02

## 2019-10-31 ASSESSMENT — ENCOUNTER SYMPTOMS
COUGH: 1
SHORTNESS OF BREATH: 0
DIARRHEA: 0
CONSTIPATION: 0
WHEEZING: 1
SORE THROAT: 1
SPUTUM PRODUCTION: 1
VOMITING: 0
NAUSEA: 0
ABDOMINAL PAIN: 0
FEVER: 1
CHILLS: 1

## 2019-10-31 ASSESSMENT — COPD QUESTIONNAIRES: COPD: 0

## 2019-10-31 NOTE — PROGRESS NOTES
Subjective:   Kristen Carrera is a 69 y.o. female who presents for Cough (x 3 days.  Pt. states that she is having sinus congestion, cough, headache and L side ear ache.  She states that she has not noticed any fever or chills. )        Cough   This is a new problem. Episode onset: 3 days. The problem has been unchanged. The cough is productive of sputum. Associated symptoms include chills, ear congestion, ear pain, a fever, nasal congestion, a sore throat and wheezing. Pertinent negatives include no shortness of breath. Risk factors: No ill contacts.  She has tried nothing for the symptoms. Her past medical history is significant for asthma (as a child ), bronchitis and pneumonia. There is no history of COPD.     Review of Systems   Constitutional: Positive for chills and fever. Negative for malaise/fatigue.   HENT: Positive for congestion, ear pain and sore throat.    Respiratory: Positive for cough, sputum production and wheezing. Negative for shortness of breath.    Gastrointestinal: Negative for abdominal pain, constipation, diarrhea, nausea and vomiting.   All other systems reviewed and are negative.      PMH:  has a past medical history of ADD (attention deficit disorder), Anxiety, Arthritis, CTS (carpal tunnel syndrome), Depression, History of abdominoplasty (3/15/2012), Hyperlipidemia, Hypothyroidism, Midline low back pain without sciatica (8/4/2015), Osteoporosis (8/19/2014), and PATENT FORAMEN OVALE LEFT TO RIGHT SHUNT (12/8/2010).  MEDS:   Current Outpatient Medications:   •  doxycycline (VIBRAMYCIN) 100 MG Tab, Take 1 Tab by mouth 2 times a day for 7 days., Disp: 14 Tab, Rfl: 0  •  moxifloxacin (VIGAMOX) 0.5 % Solution, Place 1 Drop in both eyes 3 times a day for 5 days., Disp: 1 Bottle, Rfl: 0  •  amphetamine-dextroamphetamine ER (ADDERALL XR) 30 MG XR capsule, , Disp: , Rfl:   •  amphetamine-dextroamphetamine (ADDERALL) 10 MG Tab, , Disp: , Rfl:   •  PRISTIQ 100 MG TABLET SR 24 HR, , Disp: , Rfl:   •   "zolpidem (AMBIEN) 10 MG TABS, Take 10 mg by mouth at bedtime as needed. Indications: Trouble Sleeping, Disp: , Rfl:   ALLERGIES:   Allergies   Allergen Reactions   • Hydrocodone      Nightmares and \"makes my skin crawl\"     SURGHX:   Past Surgical History:   Procedure Laterality Date   • ABDOMINOPLASTY  1977   • APPENDECTOMY     • OTHER      TUMMY TUCK, BREAST REDUCTION, LIPOSUCTION OF HIPS AND THIGHS.   • PB ANESTH,LOLA HYST FOL NEURAXIAL ANAL/ANES     • PB MAMMARY DUCTOGRAM, SINGLE     • PB REDUCTION OF LARGE BREAST     • TONSILLECTOMY     • TUBAL COAGULATION LAPAROSCOPIC BILATERAL       SOCHX:  reports that she has never smoked. She has never used smokeless tobacco. She reports that she drinks alcohol. She reports that she has current or past drug history. Drugs: Marijuana and Methamphetamines.  Family History   Problem Relation Age of Onset   • Hypertension Mother    • Cancer Mother         UTERUS   • Heart Disease Mother         Arteriosclerotic Cardiovascular Disease   • Other Mother         Coronary Artery Bypass Graft   • Psychiatric Illness Mother    • Arthritis Mother    • Cancer Father         PANCREATIC   • Cancer Sister 51        BREAST   • Breast Cancer Sister    • Stroke Neg Hx    • Diabetes Neg Hx    • Lung Disease Neg Hx         Objective:   /92   Pulse 94   Temp 37.6 °C (99.6 °F) (Temporal)   Resp 12   Ht 1.575 m (5' 2\")   Wt 68.2 kg (150 lb 6.4 oz)   SpO2 97%   BMI 27.51 kg/m²     Physical Exam   Constitutional: She is oriented to person, place, and time. She appears well-developed and well-nourished. No distress.   HENT:   Head: Normocephalic and atraumatic.   Right Ear: External ear normal. Tympanic membrane is erythematous.   Left Ear: External ear normal. Tympanic membrane is erythematous.   Nose: Mucosal edema and rhinorrhea present.   Mouth/Throat: Uvula is midline and mucous membranes are normal. Posterior oropharyngeal erythema present. No tonsillar exudate.   Eyes: Pupils are " equal, round, and reactive to light. Conjunctivae are normal.   Neck: Normal range of motion. Neck supple. No tracheal deviation present.   Cardiovascular: Normal rate and regular rhythm.   Pulmonary/Chest: Effort normal and breath sounds normal. No stridor. No respiratory distress. She has no wheezes. She has no rales.   Lymphadenopathy:     She has cervical adenopathy.   Neurological: She is alert and oriented to person, place, and time.   Skin: Skin is warm and dry. Capillary refill takes less than 2 seconds.   Psychiatric: She has a normal mood and affect. Her behavior is normal.   Vitals reviewed.        Assessment/Plan:     1. Acute non-recurrent pansinusitis  doxycycline (VIBRAMYCIN) 100 MG Tab   2. Non-recurrent acute suppurative otitis media of left ear with spontaneous rupture of tympanic membrane  doxycycline (VIBRAMYCIN) 100 MG Tab   3. Acute bacterial conjunctivitis of both eyes  moxifloxacin (VIGAMOX) 0.5 % Solution     Supportive care reviewed. URI handout provided.     Follow-up with primary care provider within 7-10 days.  If symptoms worsen or persist patient can return to clinic for reevaluation.  Red flags and emergency room precautions discussed. All side effects of medication discussed including allergic response, GI upset, tendon injury, etc. Patient confirmed understanding of information.    Please note that this dictation was created using voice recognition software. I have made every reasonable attempt to correct obvious errors, but I expect that there are errors of grammar and possibly content that I did not discover before finalizing the note.

## 2019-10-31 NOTE — PATIENT INSTRUCTIONS
"Upper Respiratory Infection, Adult  Most upper respiratory infections (URIs) are a viral infection of the air passages leading to the lungs. A URI affects the nose, throat, and upper air passages. The most common type of URI is nasopharyngitis and is typically referred to as \"the common cold.\"  URIs run their course and usually go away on their own. Most of the time, a URI does not require medical attention, but sometimes a bacterial infection in the upper airways can follow a viral infection. This is called a secondary infection. Sinus and middle ear infections are common types of secondary upper respiratory infections.  Bacterial pneumonia can also complicate a URI. A URI can worsen asthma and chronic obstructive pulmonary disease (COPD). Sometimes, these complications can require emergency medical care and may be life threatening.  What are the causes?  Almost all URIs are caused by viruses. A virus is a type of germ and can spread from one person to another.  What increases the risk?  You may be at risk for a URI if:  · You smoke.  · You have chronic heart or lung disease.  · You have a weakened defense (immune) system.  · You are very young or very old.  · You have nasal allergies or asthma.  · You work in crowded or poorly ventilated areas.  · You work in health care facilities or schools.  What are the signs or symptoms?  Symptoms typically develop 2-3 days after you come in contact with a cold virus. Most viral URIs last 7-10 days. However, viral URIs from the influenza virus (flu virus) can last 14-18 days and are typically more severe. Symptoms may include:  · Runny or stuffy (congested) nose.  · Sneezing.  · Cough.  · Sore throat.  · Headache.  · Fatigue.  · Fever.  · Loss of appetite.  · Pain in your forehead, behind your eyes, and over your cheekbones (sinus pain).  · Muscle aches.  How is this diagnosed?  Your health care provider may diagnose a URI by:  · Physical exam.  · Tests to check that your " symptoms are not due to another condition such as:  ¨ Strep throat.  ¨ Sinusitis.  ¨ Pneumonia.  ¨ Asthma.  How is this treated?  A URI goes away on its own with time. It cannot be cured with medicines, but medicines may be prescribed or recommended to relieve symptoms. Medicines may help:  · Reduce your fever.  · Reduce your cough.  · Relieve nasal congestion.  Follow these instructions at home:  · Take medicines only as directed by your health care provider.  · Gargle warm saltwater or take cough drops to comfort your throat as directed by your health care provider.  · Use a warm mist humidifier or inhale steam from a shower to increase air moisture. This may make it easier to breathe.  · Drink enough fluid to keep your urine clear or pale yellow.  · Eat soups and other clear broths and maintain good nutrition.  · Rest as needed.  · Return to work when your temperature has returned to normal or as your health care provider advises. You may need to stay home longer to avoid infecting others. You can also use a face mask and careful hand washing to prevent spread of the virus.  · Increase the usage of your inhaler if you have asthma.  · Do not use any tobacco products, including cigarettes, chewing tobacco, or electronic cigarettes. If you need help quitting, ask your health care provider.  How is this prevented?  The best way to protect yourself from getting a cold is to practice good hygiene.  · Avoid oral or hand contact with people with cold symptoms.  · Wash your hands often if contact occurs.  There is no clear evidence that vitamin C, vitamin E, echinacea, or exercise reduces the chance of developing a cold. However, it is always recommended to get plenty of rest, exercise, and practice good nutrition.  Contact a health care provider if:  · You are getting worse rather than better.  · Your symptoms are not controlled by medicine.  · You have chills.  · You have worsening shortness of breath.  · You have brown  or red mucus.  · You have yellow or brown nasal discharge.  · You have pain in your face, especially when you bend forward.  · You have a fever.  · You have swollen neck glands.  · You have pain while swallowing.  · You have white areas in the back of your throat.  Get help right away if:  · You have severe or persistent:  ¨ Headache.  ¨ Ear pain.  ¨ Sinus pain.  ¨ Chest pain.  · You have chronic lung disease and any of the following:  ¨ Wheezing.  ¨ Prolonged cough.  ¨ Coughing up blood.  ¨ A change in your usual mucus.  · You have a stiff neck.  · You have changes in your:  ¨ Vision.  ¨ Hearing.  ¨ Thinking.  ¨ Mood.  This information is not intended to replace advice given to you by your health care provider. Make sure you discuss any questions you have with your health care provider.  Document Released: 06/13/2002 Document Revised: 08/20/2017 Document Reviewed: 03/25/2015  [employer] Interactive Patient Education © 2017 [employer]

## 2019-11-02 ENCOUNTER — OFFICE VISIT (OUTPATIENT)
Dept: URGENT CARE | Facility: CLINIC | Age: 69
End: 2019-11-02
Payer: MEDICARE

## 2019-11-02 ENCOUNTER — TELEPHONE (OUTPATIENT)
Dept: URGENT CARE | Facility: CLINIC | Age: 69
End: 2019-11-02

## 2019-11-02 VITALS
TEMPERATURE: 98.2 F | RESPIRATION RATE: 17 BRPM | SYSTOLIC BLOOD PRESSURE: 110 MMHG | BODY MASS INDEX: 27.6 KG/M2 | WEIGHT: 150 LBS | OXYGEN SATURATION: 98 % | DIASTOLIC BLOOD PRESSURE: 72 MMHG | HEIGHT: 62 IN | HEART RATE: 102 BPM

## 2019-11-02 DIAGNOSIS — H10.33 ACUTE BACTERIAL CONJUNCTIVITIS OF BOTH EYES: ICD-10-CM

## 2019-11-02 DIAGNOSIS — J06.9 URI WITH COUGH AND CONGESTION: ICD-10-CM

## 2019-11-02 DIAGNOSIS — J01.40 ACUTE NON-RECURRENT PANSINUSITIS: Primary | ICD-10-CM

## 2019-11-02 PROCEDURE — 99214 OFFICE O/P EST MOD 30 MIN: CPT | Performed by: PHYSICIAN ASSISTANT

## 2019-11-02 RX ORDER — POLYMYXIN B SULFATE AND TRIMETHOPRIM 1; 10000 MG/ML; [USP'U]/ML
1 SOLUTION OPHTHALMIC EVERY 4 HOURS
Qty: 10 ML | Refills: 0 | Status: SHIPPED | OUTPATIENT
Start: 2019-11-02 | End: 2021-10-28

## 2019-11-02 RX ORDER — ARIPIPRAZOLE 2 MG/1
TABLET ORAL
Refills: 2 | COMMUNITY
Start: 2019-10-22 | End: 2019-12-19

## 2019-11-02 RX ORDER — METHYLPREDNISOLONE 4 MG/1
TABLET ORAL
Qty: 21 TAB | Refills: 0 | Status: SHIPPED | OUTPATIENT
Start: 2019-11-02 | End: 2021-10-28

## 2019-11-02 RX ORDER — AMOXICILLIN AND CLAVULANATE POTASSIUM 875; 125 MG/1; MG/1
1 TABLET, FILM COATED ORAL 2 TIMES DAILY
Qty: 14 TAB | Refills: 0 | Status: SHIPPED | OUTPATIENT
Start: 2019-11-02 | End: 2019-11-09

## 2019-11-03 NOTE — PATIENT INSTRUCTIONS
Bacterial Conjunctivitis  Bacterial conjunctivitis is an infection of the clear membrane that covers the white part of your eye and the inner surface of your eyelid (conjunctiva). When the blood vessels in your conjunctiva become inflamed, your eye becomes red or pink, and it will probably feel itchy. Bacterial conjunctivitis spreads very easily from person to person (is contagious). It also spreads easily from one eye to the other eye.  What are the causes?  This condition is caused by several common bacteria. You may get the infection if you come into close contact with another person who is infected. You may also come into contact with items that are contaminated with the bacteria, such as a face towel, contact lens solution, or eye makeup.  What increases the risk?  This condition is more likely to develop in people who:  · Are exposed to other people who have the infection.  · Wear contact lenses.  · Have a sinus infection.  · Have had a recent eye injury or surgery.  · Have a weak body defense system (immune system).  · Have a medical condition that causes dry eyes.  What are the signs or symptoms?  Symptoms of this condition include:  · Eye redness.  · Tearing or watery eyes.  · Itchy eyes.  · Burning feeling in your eyes.  · Thick, yellowish discharge from an eye. This may turn into a crust on the eyelid overnight and cause your eyelids to stick together.  · Swollen eyelids.  · Blurred vision.  How is this diagnosed?  Your health care provider can diagnose this condition based on your symptoms and medical history. Your health care provider may also take a sample of discharge from your eye to find the cause of your infection. This is rarely done.  How is this treated?  Treatment for this condition includes:  · Antibiotic eye drops or ointment to clear the infection more quickly and prevent the spread of infection to others.  · Oral antibiotic medicines to treat infections that do not respond to drops or  ointments, or last longer than 10 days.  · Cool, wet cloths (cool compresses) placed on the eyes.  · Artificial tears applied 2-6 times a day.  Follow these instructions at home:  Medicines  · Take or apply your antibiotic medicine as told by your health care provider. Do not stop taking or applying the antibiotic even if you start to feel better.  · Take or apply over-the-counter and prescription medicines only as told by your health care provider.  · Be very careful to avoid touching the edge of your eyelid with the eye drop bottle or the ointment tube when you apply medicines to the affected eye. This will keep you from spreading the infection to your other eye or to other people.  Managing discomfort  · Gently wipe away any drainage from your eye with a warm, wet washcloth or a cotton ball.  · Apply a cool, clean washcloth to your eye for 10-20 minutes, 3-4 times a day.  General instructions  · Do not wear contact lenses until the inflammation is gone and your health care provider says it is safe to wear them again. Ask your health care provider how to sterilize or replace your contact lenses before you use them again. Wear glasses until you can resume wearing contacts.  · Avoid wearing eye makeup until the inflammation is gone. Throw away any old eye cosmetics that may be contaminated.  · Change or wash your pillowcase every day.  · Do not share towels or washcloths. This may spread the infection.  · Wash your hands often with soap and water. Use paper towels to dry your hands.  · Avoid touching or rubbing your eyes.  · Do not drive or use heavy machinery if your vision is blurred.  Contact a health care provider if:  · You have a fever.  · Your symptoms do not get better after 10 days.  Get help right away if:  · You have a fever and your symptoms suddenly get worse.  · You have severe pain when you move your eye.  · You have facial pain, redness, or swelling.  · You have sudden loss of vision.  This  information is not intended to replace advice given to you by your health care provider. Make sure you discuss any questions you have with your health care provider.  Document Released: 12/18/2006 Document Revised: 04/27/2017 Document Reviewed: 09/29/2016  [employer] Interactive Patient Education © 2017 [employer]

## 2019-11-03 NOTE — TELEPHONE ENCOUNTER
Pt pharmacy called regarding the prescription not being covered by her insurance. Is there any way you can changed it and call it. The pt stated to the pharmacy that her eyes really hurts and I have advised for the pharmacy to tell pt to come back in or go to the emergency room.

## 2019-12-19 ENCOUNTER — HOSPITAL ENCOUNTER (EMERGENCY)
Facility: MEDICAL CENTER | Age: 69
End: 2019-12-19
Attending: EMERGENCY MEDICINE
Payer: MEDICARE

## 2019-12-19 ENCOUNTER — APPOINTMENT (OUTPATIENT)
Dept: RADIOLOGY | Facility: MEDICAL CENTER | Age: 69
End: 2019-12-19
Attending: EMERGENCY MEDICINE
Payer: MEDICARE

## 2019-12-19 VITALS
OXYGEN SATURATION: 98 % | RESPIRATION RATE: 16 BRPM | HEART RATE: 90 BPM | WEIGHT: 149.91 LBS | DIASTOLIC BLOOD PRESSURE: 86 MMHG | BODY MASS INDEX: 27.59 KG/M2 | SYSTOLIC BLOOD PRESSURE: 142 MMHG | HEIGHT: 62 IN | TEMPERATURE: 99.5 F

## 2019-12-19 DIAGNOSIS — J10.1 INFLUENZA B: ICD-10-CM

## 2019-12-19 LAB
FLUAV RNA SPEC QL NAA+PROBE: NEGATIVE
FLUBV RNA SPEC QL NAA+PROBE: POSITIVE

## 2019-12-19 PROCEDURE — 87502 INFLUENZA DNA AMP PROBE: CPT

## 2019-12-19 PROCEDURE — A9270 NON-COVERED ITEM OR SERVICE: HCPCS | Performed by: EMERGENCY MEDICINE

## 2019-12-19 PROCEDURE — 700102 HCHG RX REV CODE 250 W/ 637 OVERRIDE(OP): Performed by: EMERGENCY MEDICINE

## 2019-12-19 PROCEDURE — 71046 X-RAY EXAM CHEST 2 VIEWS: CPT

## 2019-12-19 PROCEDURE — 99284 EMERGENCY DEPT VISIT MOD MDM: CPT

## 2019-12-19 RX ORDER — IBUPROFEN 600 MG/1
600 TABLET ORAL ONCE
Status: COMPLETED | OUTPATIENT
Start: 2019-12-19 | End: 2019-12-19

## 2019-12-19 RX ORDER — ACETAMINOPHEN 325 MG/1
650 TABLET ORAL ONCE
Status: COMPLETED | OUTPATIENT
Start: 2019-12-19 | End: 2019-12-19

## 2019-12-19 RX ORDER — OSELTAMIVIR PHOSPHATE 75 MG/1
75 CAPSULE ORAL 2 TIMES DAILY
Qty: 10 CAP | Refills: 0 | Status: SHIPPED | OUTPATIENT
Start: 2019-12-19 | End: 2019-12-24

## 2019-12-19 RX ORDER — IBUPROFEN 600 MG/1
600 TABLET ORAL EVERY 6 HOURS PRN
Qty: 20 TAB | Refills: 0 | Status: SHIPPED | OUTPATIENT
Start: 2019-12-19 | End: 2021-10-28

## 2019-12-19 RX ADMIN — IBUPROFEN 600 MG: 600 TABLET ORAL at 16:04

## 2019-12-19 RX ADMIN — ACETAMINOPHEN 650 MG: 325 TABLET, FILM COATED ORAL at 16:05

## 2019-12-19 NOTE — ED NOTES
Pt brought back from lobby, ambulatory with steady gait.     C/o non-productive cough x 3-4 days. Pt also c/o bilateral rib pain d/t coughing. +subjective fevers. A/o x4, speaking in full sentences.

## 2019-12-19 NOTE — ED PROVIDER NOTES
ED Provider Note    Scribed for Mikey Fang M.D. by Rachana Barbosa. 12/19/2019, 3:55 PM.    Primary care provider: Binta To M.D.  Means of arrival: Walk-In  History obtained from: Patient  History limited by: None    CHIEF COMPLAINT  Chief Complaint   Patient presents with   • Flu Like Symptoms     HPI  Kristen Carrera is a 69 y.o. female who presents to the Emergency Department with complaints of flu-like symptoms intermittently for the past month but worsening 3-4 days ago with a worsening dry non productive deep cough, fever, and headache. She also endorses rib pain and abdominal pain secondary to her cough. Denies diarrhea.     REVIEW OF SYSTEMS  Pertinent positives include cough, fever, rib pain, abdominal pain, headache.   Pertinent negatives include no diarrhea.      PAST MEDICAL HISTORY   has a past medical history of ADD (attention deficit disorder), Anxiety, Arthritis, CTS (carpal tunnel syndrome), Depression, History of abdominoplasty (3/15/2012), Hyperlipidemia, Hypothyroidism, Midline low back pain without sciatica (8/4/2015), Osteoporosis (8/19/2014), and PATENT FORAMEN OVALE LEFT TO RIGHT SHUNT (12/8/2010).    SURGICAL HISTORY   has a past surgical history that includes anesth,isha hyst fol neuraxial anal/anes; appendectomy; tubal coagulation laparoscopic bilateral; other; tonsillectomy; mammary ductogram, single; reduction of large breast; and abdominoplasty (1977).    SOCIAL HISTORY  Social History     Tobacco Use   • Smoking status: Never Smoker   • Smokeless tobacco: Never Used   Substance Use Topics   • Alcohol use: Yes     Comment: rare   • Drug use: Yes     Types: Marijuana, Methamphetamines     Comment: marijuana several times a year, H/O METHAMPHETAMINE FOR 17 YRS.  QUIT 2008.      Social History     Substance and Sexual Activity   Drug Use Yes   • Types: Marijuana, Methamphetamines    Comment: marijuana several times a year, H/O METHAMPHETAMINE FOR 17 YRS.  QUIT 2008.  "      FAMILY HISTORY  Family History   Problem Relation Age of Onset   • Hypertension Mother    • Cancer Mother         UTERUS   • Heart Disease Mother         Arteriosclerotic Cardiovascular Disease   • Other Mother         Coronary Artery Bypass Graft   • Psychiatric Illness Mother    • Arthritis Mother    • Cancer Father         PANCREATIC   • Cancer Sister 51        BREAST   • Breast Cancer Sister    • Stroke Neg Hx    • Diabetes Neg Hx    • Lung Disease Neg Hx        CURRENT MEDICATIONS  Home Medications     Reviewed by Zac Morrison R.N. (Registered Nurse) on 12/19/19 at 1540  Med List Status: Partial   Medication Last Dose Status   amphetamine-dextroamphetamine (ADDERALL) 10 MG Tab 12/19/2019 Active   amphetamine-dextroamphetamine ER (ADDERALL XR) 30 MG XR capsule 12/19/2019 Active   methylPREDNISolone (MEDROL DOSEPAK) 4 MG Tablet Therapy Pack  Active   polymixin-trimethoprim (POLYTRIM) 59616-5.1 UNIT/ML-% Solution  Active   PRISTIQ 100 MG TABLET SR 24 HR 12/19/2019 Active   zolpidem (AMBIEN) 10 MG TABS  Active                ALLERGIES  Allergies   Allergen Reactions   • Hydrocodone      Nightmares and \"makes my skin crawl\"       PHYSICAL EXAM  VITAL SIGNS: /87   Pulse 92   Temp (!) 38.1 °C (100.6 °F) (Oral)   Resp 17   Ht 1.575 m (5' 2\")   Wt 68 kg (149 lb 14.6 oz)   SpO2 97%   BMI 27.42 kg/m²     Nursing note and vitals reviewed.  Constitutional: Well-developed and well-nourished. No distress.  Occasional prominent cough noted.   HENT: Head is normocephalic and atraumatic. Oropharynx is clear and moist without exudate or erythema.   Eyes: Pupils are equal, round, and reactive to light. Conjunctiva are normal.   Cardiovascular: Normal rate and regular rhythm. No murmur heard. Normal radial pulses.  Pulmonary/Chest: Prominent dry cough noted. Scattered rhonchi. Breath sounds normal. No wheezes or rales.   Abdominal: Soft and non-tender. No distention    Musculoskeletal: Extremities exhibit " normal range of motion without edema or tenderness.   Neurological: Awake, alert and oriented to person, place, and time. No focal deficits noted.  Skin: Skin is warm and dry. No rash.  Tactile fever.  Psychiatric: Normal mood and affect. Appropriate for clinical situation      DIAGNOSTIC STUDIES / PROCEDURES    LABS  Results for orders placed or performed during the hospital encounter of 12/19/19   Influenza A/B By PCR (Adult - Flu Only)   Result Value Ref Range    Influenza virus A RNA Negative Negative    Influenza virus B, PCR POSITIVE (A) Negative       All labs reviewed by me.    RADIOLOGY  DX-CHEST-2 VIEWS   Final Result      1.  Possible mild diffuse interstitial prominence could be due to edema or inflammation.      2.  No consolidations identified.        The radiologist's interpretation of all radiological studies have been reviewed by me.    COURSE & MEDICAL DECISION MAKING  Nursing notes, VS, PMSFHx reviewed in chart.    3:55 PM - Patient seen and examined at bedside. Plan of care was discussed with the patient which includes chest x-ray to rule out pneumonia and labs to rule out influenza. She verbalized her understanding and agrees with the plan of care. Patient will be treated with Motrin 600 mg and Tylenol 650 mg. Ordered DX-chest and Influenza A/B by PCR to evaluate her symptoms.     5:06 PM patient presents today with influenza-like symptoms.  Influenza PCR is positive for influenza B.  The patient will be treated with Tamiflu and Motrin.  Recommended plenty of fluids and rest.  Patient will be discharged home in stable condition.    DISPOSITION:  Patient will be discharged home in stable condition.    FOLLOW UP:  Binta To M.D.  [address] [id number] [phone]    Schedule an appointment as soon as possible for a visit       [facility], Emergency Dept  [address] [place] [id number] [phone]    If symptoms worsen      OUTPATIENT  MEDICATIONS:  New Prescriptions    IBUPROFEN (MOTRIN) 600 MG TAB    Take 1 Tab by mouth every 6 hours as needed.    OSELTAMIVIR (TAMIFLU) 75 MG CAP    Take 1 Cap by mouth 2 times a day for 5 days.       The patient was discharged home with an information sheet on influenza and told to return immediately for any signs or symptoms listed.  The patient agreed to the discharge precautions and follow-up plan which is documented in EPIC.    FINAL IMPRESSION  1. Influenza B          Rachana TOMLIN (Lou), am scribing for, and in the presence of, Mikey Fang M.D..    Electronically signed by: Rachana Barbosa (Lou), 12/19/2019    Mikey TOMLIN M.D. personally performed the services described in this documentation, as scribed by Rachana Barbosa in my presence, and it is both accurate and complete. E    The note accurately reflects work and decisions made by me.  Mikey Fnag  12/19/2019  5:06 PM

## 2019-12-19 NOTE — ED TRIAGE NOTES
68 y/o female ambulatory to triage with c/o flu like symptoms x 4 days. Pt states she has been coughing and has generalized body aches. Also c/o rib pain r/t coughing.

## 2019-12-20 NOTE — ED NOTES
Pt verbally understands d/c instructions given. 2 prescriptions sent electronically to pt's pharmacy. All questions answered. Pt stable and ambulatory upon discharge.

## 2019-12-20 NOTE — ED NOTES
Awaiting flu swab results. Pt resting quietly in bed. Respirations even and unlabored. Will continue to monitor.

## 2021-01-15 DIAGNOSIS — Z23 NEED FOR VACCINATION: ICD-10-CM

## 2021-10-21 ENCOUNTER — APPOINTMENT (OUTPATIENT)
Dept: RADIOLOGY | Facility: MEDICAL CENTER | Age: 71
End: 2021-10-21
Attending: EMERGENCY MEDICINE
Payer: MEDICARE

## 2021-10-21 ENCOUNTER — HOSPITAL ENCOUNTER (EMERGENCY)
Facility: MEDICAL CENTER | Age: 71
End: 2021-10-21
Attending: EMERGENCY MEDICINE
Payer: MEDICARE

## 2021-10-21 VITALS
DIASTOLIC BLOOD PRESSURE: 82 MMHG | HEIGHT: 62 IN | RESPIRATION RATE: 17 BRPM | SYSTOLIC BLOOD PRESSURE: 128 MMHG | HEART RATE: 82 BPM | BODY MASS INDEX: 27.59 KG/M2 | TEMPERATURE: 97.7 F | OXYGEN SATURATION: 94 % | WEIGHT: 149.91 LBS

## 2021-10-21 DIAGNOSIS — D72.829 LEUKOCYTOSIS, UNSPECIFIED TYPE: ICD-10-CM

## 2021-10-21 DIAGNOSIS — R41.3 MEMORY DIFFICULTY: ICD-10-CM

## 2021-10-21 DIAGNOSIS — E87.6 HYPOKALEMIA: ICD-10-CM

## 2021-10-21 DIAGNOSIS — R31.9 HEMATURIA, UNSPECIFIED TYPE: ICD-10-CM

## 2021-10-21 LAB
ALBUMIN SERPL BCP-MCNC: 3.5 G/DL (ref 3.2–4.9)
ALBUMIN/GLOB SERPL: 0.9 G/DL
ALP SERPL-CCNC: 76 U/L (ref 30–99)
ALT SERPL-CCNC: 12 U/L (ref 2–50)
ANION GAP SERPL CALC-SCNC: 14 MMOL/L (ref 7–16)
APPEARANCE UR: CLEAR
AST SERPL-CCNC: 14 U/L (ref 12–45)
BACTERIA #/AREA URNS HPF: NEGATIVE /HPF
BASOPHILS # BLD AUTO: 0 % (ref 0–1.8)
BASOPHILS # BLD: 0 K/UL (ref 0–0.12)
BILIRUB SERPL-MCNC: 0.4 MG/DL (ref 0.1–1.5)
BILIRUB UR QL STRIP.AUTO: NEGATIVE
BUN SERPL-MCNC: 16 MG/DL (ref 8–22)
CALCIUM SERPL-MCNC: 10.1 MG/DL (ref 8.5–10.5)
CHLORIDE SERPL-SCNC: 97 MMOL/L (ref 96–112)
CO2 SERPL-SCNC: 24 MMOL/L (ref 20–33)
COLOR UR: YELLOW
CREAT SERPL-MCNC: 1.14 MG/DL (ref 0.5–1.4)
EKG IMPRESSION: NORMAL
EOSINOPHIL # BLD AUTO: 0.11 K/UL (ref 0–0.51)
EOSINOPHIL NFR BLD: 0.9 % (ref 0–6.9)
EPI CELLS #/AREA URNS HPF: ABNORMAL /HPF
ERYTHROCYTE [DISTWIDTH] IN BLOOD BY AUTOMATED COUNT: 43 FL (ref 35.9–50)
GLOBULIN SER CALC-MCNC: 4 G/DL (ref 1.9–3.5)
GLUCOSE SERPL-MCNC: 118 MG/DL (ref 65–99)
GLUCOSE UR STRIP.AUTO-MCNC: NEGATIVE MG/DL
HCT VFR BLD AUTO: 35.8 % (ref 37–47)
HGB BLD-MCNC: 12.2 G/DL (ref 12–16)
HYALINE CASTS #/AREA URNS LPF: ABNORMAL /LPF
KETONES UR STRIP.AUTO-MCNC: ABNORMAL MG/DL
LEUKOCYTE ESTERASE UR QL STRIP.AUTO: ABNORMAL
LIPASE SERPL-CCNC: 32 U/L (ref 11–82)
LYMPHOCYTES # BLD AUTO: 1.64 K/UL (ref 1–4.8)
LYMPHOCYTES NFR BLD: 13.1 % (ref 22–41)
MANUAL DIFF BLD: NORMAL
MCH RBC QN AUTO: 31.1 PG (ref 27–33)
MCHC RBC AUTO-ENTMCNC: 34.1 G/DL (ref 33.6–35)
MCV RBC AUTO: 91.3 FL (ref 81.4–97.8)
METAMYELOCYTES NFR BLD MANUAL: 1.7 %
MICRO URNS: ABNORMAL
MONOCYTES # BLD AUTO: 1.3 K/UL (ref 0–0.85)
MONOCYTES NFR BLD AUTO: 10.4 % (ref 0–13.4)
MORPHOLOGY BLD-IMP: NORMAL
MYELOCYTES NFR BLD MANUAL: 0.9 %
NEUTROPHILS # BLD AUTO: 9.13 K/UL (ref 2–7.15)
NEUTROPHILS NFR BLD: 73 % (ref 44–72)
NITRITE UR QL STRIP.AUTO: NEGATIVE
NRBC # BLD AUTO: 0 K/UL
NRBC BLD-RTO: 0 /100 WBC
PH UR STRIP.AUTO: 5.5 [PH] (ref 5–8)
PLATELET # BLD AUTO: 488 K/UL (ref 164–446)
PLATELET BLD QL SMEAR: NORMAL
PMV BLD AUTO: 9.7 FL (ref 9–12.9)
POTASSIUM SERPL-SCNC: 3.4 MMOL/L (ref 3.6–5.5)
PROT SERPL-MCNC: 7.5 G/DL (ref 6–8.2)
PROT UR QL STRIP: NEGATIVE MG/DL
RBC # BLD AUTO: 3.92 M/UL (ref 4.2–5.4)
RBC # URNS HPF: ABNORMAL /HPF
RBC BLD AUTO: NORMAL
RBC UR QL AUTO: NEGATIVE
SODIUM SERPL-SCNC: 135 MMOL/L (ref 135–145)
SP GR UR STRIP.AUTO: 1.02
TROPONIN T SERPL-MCNC: 13 NG/L (ref 6–19)
UROBILINOGEN UR STRIP.AUTO-MCNC: 0.2 MG/DL
WBC # BLD AUTO: 12.5 K/UL (ref 4.8–10.8)
WBC #/AREA URNS HPF: ABNORMAL /HPF

## 2021-10-21 PROCEDURE — 80053 COMPREHEN METABOLIC PANEL: CPT

## 2021-10-21 PROCEDURE — 81001 URINALYSIS AUTO W/SCOPE: CPT

## 2021-10-21 PROCEDURE — 84484 ASSAY OF TROPONIN QUANT: CPT

## 2021-10-21 PROCEDURE — 85007 BL SMEAR W/DIFF WBC COUNT: CPT

## 2021-10-21 PROCEDURE — 85027 COMPLETE CBC AUTOMATED: CPT

## 2021-10-21 PROCEDURE — 70450 CT HEAD/BRAIN W/O DYE: CPT | Mod: MG

## 2021-10-21 PROCEDURE — 83690 ASSAY OF LIPASE: CPT

## 2021-10-21 PROCEDURE — 93005 ELECTROCARDIOGRAM TRACING: CPT | Performed by: EMERGENCY MEDICINE

## 2021-10-21 PROCEDURE — 76856 US EXAM PELVIC COMPLETE: CPT

## 2021-10-21 PROCEDURE — 99284 EMERGENCY DEPT VISIT MOD MDM: CPT

## 2021-10-21 PROCEDURE — 87086 URINE CULTURE/COLONY COUNT: CPT

## 2021-10-21 NOTE — ED TRIAGE NOTES
"Chief Complaint   Patient presents with   • Painful Urination     UTI two weeks ago, treated with Abx, is now experiencing \"bladder pain\"   • Dizziness     Started three days ago and has been getting worse.   • Body Aches     Started three days ago     Pt ambulatory to triage for above complaint.  Pt is AO x 4, follows commands, and responds appropriately to questions. Patient's breathing is unlabored and pain is currently 0/10 on the 0-10 pain scale.  Pt placed in lobby with instructions for urine collection. Patient educated on triage process and encouraged to alert staff for any changes.    "

## 2021-10-22 NOTE — DISCHARGE INSTRUCTIONS
Establish care with a gynecologist to evaluate for the need for an endometrial/uterine biopsy.    A urine culture is pending and will be resulted in 24 to 48 hours in the lab.  If you require antibiotics we will call you.

## 2021-10-22 NOTE — ED PROVIDER NOTES
"ED Provider Note    CHIEF COMPLAINT  Chief Complaint   Patient presents with   • Painful Urination     UTI two weeks ago, treated with Abx, is now experiencing \"bladder pain\"   • Dizziness     Started three days ago and has been getting worse.   • Body Aches     Started three days ago       HPI  Kristen Carrera is a 71 y.o. female who presents complaining of memory issues, urinary symptoms, and decreased appetite.    Patient states 2 weeks ago she was on a ride that simulates motion and movement.  When she stepped off she fell and landed on her left side due to dizziness.  As she was writing a report on the incident, she was unable to recall what had happened for approximately 4 minutes.  Today, at triage, she once again had difficulty recalling why she was here.    Patient reports no appetite for the last week.  She also reports urinary urgency and frequency but no dysuria for the last several days.  She has also noted pinkish urine on the toilet paper with wiping.  She took an antibiotic that she has at home once a day for the last week but feels her urinary symptoms have continued.  She also complains of dull achy pain in the right lower quadrant.  Patient has had an appendectomy.    Patient denies nausea, vomiting, chest pain, shortness of breath, head trauma, weakness, numbness, speech issues, ataxia.    Patient states that she goes to the [facility] clinic and had her last GYN exam there 2 years ago.  Her mom has a history of uterine cancer.      ALLERGIES  Allergies   Allergen Reactions   • Hydrocodone      Nightmares and \"makes my skin crawl\"       CURRENT MEDICATIONS  Home Medications     Reviewed by Luigi Miranda R.N. (Registered Nurse) on 10/21/21 at 1443  Med List Status: Partial   Medication Last Dose Status   amphetamine-dextroamphetamine (ADDERALL) 10 MG Tab  Active   amphetamine-dextroamphetamine ER (ADDERALL XR) 30 MG XR capsule  Active   ibuprofen (MOTRIN) 600 MG Tab  Active " "  methylPREDNISolone (MEDROL DOSEPAK) 4 MG Tablet Therapy Pack  Active   polymixin-trimethoprim (POLYTRIM) 79702-5.1 UNIT/ML-% Solution  Active   PRISTIQ 100 MG TABLET SR 24 HR  Active   zolpidem (AMBIEN) 10 MG TABS  Active                PAST MEDICAL HISTORY   has a past medical history of ADD (attention deficit disorder), Anxiety, Arthritis, CTS (carpal tunnel syndrome), Depression, History of abdominoplasty (3/15/2012), Hyperlipidemia, Hypothyroidism, Midline low back pain without sciatica (8/4/2015), Osteoporosis (8/19/2014), and PATENT FORAMEN OVALE LEFT TO RIGHT SHUNT (12/8/2010).    SURGICAL HISTORY   has a past surgical history that includes anesth,isha hyst fol neuraxial anal/anes; appendectomy; tubal coagulation laparoscopic bilateral; other; tonsillectomy; mammary ductogram, single; breast reduction; and abdominoplasty (1977).    SOCIAL HISTORY  Social History     Tobacco Use   • Smoking status: Never Smoker   • Smokeless tobacco: Never Used   Vaping Use   • Vaping Use: Never used   Substance and Sexual Activity   • Alcohol use: Yes     Comment: rare   • Drug use: Yes     Types: Marijuana, Methamphetamines     Comment: marijuana several times a year, H/O METHAMPHETAMINE FOR 17 YRS.  QUIT 2008.   • Sexual activity: Not Currently     Birth control/protection: Post-Menopausal     Patient originally from [place], [place]    Family Hx:  Mother with uterine cancer      REVIEW OF SYSTEMS  See HPI for further details.  All other systems are negative except as above in HPI.      PHYSICAL EXAM  VITAL SIGNS: /106   Pulse 92   Temp 37 °C (98.6 °F) (Temporal)   Resp 16   Ht 1.575 m (5' 2\")   Wt 68 kg (149 lb 14.6 oz)   SpO2 96%   BMI 27.42 kg/m²     General:  WDWN elderly female, nontoxic appearing in NAD; A+Ox3; V/S as above; afebrile  Skin: warm and dry; good color; no rash  HEENT: NCAT; EOMs intact; PERRL; no scleral icterus   Neck: FROM; soft  Cardiovascular: Regular heart rate and rhythm.  No " murmurs, rubs, or gallops; pulses 2+ bilaterally radially  Lungs: Clear to auscultation with good air movement bilaterally.  No wheezes, rhonchi, or rales.   Abdomen: BS present; soft; NTND; no rebound, guarding, or rigidity.  No organomegaly or pulsatile mass   Extremities: MENCHACA x 4; no e/o trauma; no pedal edema; neg Joanna's  Neurologic: CNs III-XII grossly intact; speech clear; distal sensation intact; strength 5/5 UE/LEs  Psychiatric: Appropriate affect, normal mood    LABS  Results for orders placed or performed during the hospital encounter of 10/21/21   CBC WITH DIFFERENTIAL   Result Value Ref Range    WBC 12.5 (H) 4.8 - 10.8 K/uL    RBC 3.92 (L) 4.20 - 5.40 M/uL    Hemoglobin 12.2 12.0 - 16.0 g/dL    Hematocrit 35.8 (L) 37.0 - 47.0 %    MCV 91.3 81.4 - 97.8 fL    MCH 31.1 27.0 - 33.0 pg    MCHC 34.1 33.6 - 35.0 g/dL    RDW 43.0 35.9 - 50.0 fL    Platelet Count 488 (H) 164 - 446 K/uL    MPV 9.7 9.0 - 12.9 fL    Neutrophils-Polys 73.00 (H) 44.00 - 72.00 %    Lymphocytes 13.10 (L) 22.00 - 41.00 %    Monocytes 10.40 0.00 - 13.40 %    Eosinophils 0.90 0.00 - 6.90 %    Basophils 0.00 0.00 - 1.80 %    Nucleated RBC 0.00 /100 WBC    Neutrophils (Absolute) 9.13 (H) 2.00 - 7.15 K/uL    Lymphs (Absolute) 1.64 1.00 - 4.80 K/uL    Monos (Absolute) 1.30 (H) 0.00 - 0.85 K/uL    Eos (Absolute) 0.11 0.00 - 0.51 K/uL    Baso (Absolute) 0.00 0.00 - 0.12 K/uL    NRBC (Absolute) 0.00 K/uL   COMP METABOLIC PANEL   Result Value Ref Range    Sodium 135 135 - 145 mmol/L    Potassium 3.4 (L) 3.6 - 5.5 mmol/L    Chloride 97 96 - 112 mmol/L    Co2 24 20 - 33 mmol/L    Anion Gap 14.0 7.0 - 16.0    Glucose 118 (H) 65 - 99 mg/dL    Bun 16 8 - 22 mg/dL    Creatinine 1.14 0.50 - 1.40 mg/dL    Calcium 10.1 8.5 - 10.5 mg/dL    AST(SGOT) 14 12 - 45 U/L    ALT(SGPT) 12 2 - 50 U/L    Alkaline Phosphatase 76 30 - 99 U/L    Total Bilirubin 0.4 0.1 - 1.5 mg/dL    Albumin 3.5 3.2 - 4.9 g/dL    Total Protein 7.5 6.0 - 8.2 g/dL    Globulin 4.0 (H) 1.9  - 3.5 g/dL    A-G Ratio 0.9 g/dL   LIPASE   Result Value Ref Range    Lipase 32 11 - 82 U/L   URINALYSIS    Specimen: Blood   Result Value Ref Range    Color Yellow     Character Clear     Specific Gravity 1.016 <1.035    Ph 5.5 5.0 - 8.0    Glucose Negative Negative mg/dL    Ketones Trace (A) Negative mg/dL    Protein Negative Negative mg/dL    Bilirubin Negative Negative    Urobilinogen, Urine 0.2 Negative    Nitrite Negative Negative    Leukocyte Esterase Trace (A) Negative    Occult Blood Negative Negative    Micro Urine Req Microscopic    URINE MICROSCOPIC (W/UA)   Result Value Ref Range    WBC 5-10 (A) /hpf    RBC 2-5 (A) /hpf    Bacteria Negative None /hpf    Epithelial Cells Few /hpf    Hyaline Cast 3-5 (A) /lpf   ESTIMATED GFR   Result Value Ref Range    GFR If  57 (A) >60 mL/min/1.73 m 2    GFR If Non  47 (A) >60 mL/min/1.73 m 2   DIFFERENTIAL MANUAL   Result Value Ref Range    Metamyelocytes 1.70 %    Myelocytes 0.90 %    Manual Diff Status PERFORMED    PERIPHERAL SMEAR REVIEW   Result Value Ref Range    Peripheral Smear Review see below    PLATELET ESTIMATE   Result Value Ref Range    Plt Estimation Increased    MORPHOLOGY   Result Value Ref Range    RBC Morphology Normal    TROPONIN   Result Value Ref Range    Troponin T 13 6 - 19 ng/L   EKG   Result Value Ref Range    Report       [facility]    Test Date:  2021-10-21  Pt Name:    CONCHITA CHAVEZ                   Department: ER  MRN:        [id number]                      Room:       [facility]  Gender:     Female                       Technician: [id number]  :        [id number]                   Requested By:RONEY GUERRERO  Order #:    [id number]                    Reading MD: RONEY GUERRERO MD    Measurements  Intervals                                Axis  Rate:       76                           P:          53  KY:         153                          QRS:        60  QRSD:       90                            T:          61  QT:         419  QTc:        471    Interpretive Statements  Sinus rhythm  Low voltage, precordial leads  Compared to ECG 05/29/2017 15:35:00  Low QRS voltage now present  T-wave abnormality no longer present  no acute ST changes  Electronically Signed On 10- 19:04:53 PDT by RONEY GUERRERO MD         IMAGING  US-PELVIC COMPLETE (TRANSABDOMINAL/TRANSVAGINAL) (COMBO)   Final Result         1.  Small quantity of free fluid in the endometrial canal, otherwise unremarkable transvaginal appearance of the pelvis.   2.  Examination is technically limited due to nonvisualization of the ovaries.      CT-HEAD W/O   Final Result      1.  No evidence of acute intracranial process.      2.  Cerebral atrophy as well as periventricular chronic small vessel ischemic change.          MEDICAL RECORD  I have reviewed patient's medical record and pertinent results are listed below.      COURSE & MEDICAL DECISION MAKING  I have reviewed any medical record information, laboratory studies and radiographic results as noted.    Kristen Carrera is a 71 y.o. female who presents complaining of intermittent memory issues with recall, urinary urgency and frequency and hematuria but no dysuria.    Initial blood pressure was 95/65.  Patient was not tachycardic or febrile with this.    Appropriate PPE was worn at all times while interacting with the patient.    Labs were drawn per protocol and reviewed.  Urinalysis is negative for UTI.  Urine culture added given patient's urinary symptoms.  White blood cell count is slightly elevated to 12.5, platelets are elevated to 488.  Patient has a lymphopenia and mild hypokalemia.    Orthostatics are negative.    EKG shows no acute ST changes.    CT head demonstrates no intracranial hemorrhage or mass.    Pelvic ultrasound was ordered given patient's urinary urgency and frequency to evaluate for possible mass/tumor causing urinary symptoms since her urinalysis appears  negative.  She has been taking an unknown antibiotic once daily for the last week.  Perhaps she has a partially treated UTI versus ovarian mass versus endometrial cancer.  This was negative for obvious ovarian mass but ovaries were not visualized.    I advised the patient that she should follow-up with a gynecologist and have referred her to [facility] for reevaluation given report of hematuria with wiping/on the toilet paper.    FINAL IMPRESSION  1. Memory difficulty     2. Hematuria, unspecified type     3. Hypokalemia     4. Leukocytosis, unspecified type       Electronically signed by: Karissa Gonzalez M.D., 10/21/2021 5:55 PM

## 2021-10-23 LAB
BACTERIA UR CULT: NORMAL
SIGNIFICANT IND 70042: NORMAL
SITE SITE: NORMAL
SOURCE SOURCE: NORMAL

## 2021-10-28 ENCOUNTER — OFFICE VISIT (OUTPATIENT)
Dept: MEDICAL GROUP | Facility: MEDICAL CENTER | Age: 71
End: 2021-10-28
Attending: INTERNAL MEDICINE
Payer: MEDICARE

## 2021-10-28 VITALS
HEIGHT: 62 IN | TEMPERATURE: 97.7 F | RESPIRATION RATE: 16 BRPM | HEART RATE: 78 BPM | DIASTOLIC BLOOD PRESSURE: 78 MMHG | WEIGHT: 149 LBS | BODY MASS INDEX: 27.42 KG/M2 | OXYGEN SATURATION: 99 % | SYSTOLIC BLOOD PRESSURE: 118 MMHG

## 2021-10-28 DIAGNOSIS — N39.41 URGE INCONTINENCE OF URINE: ICD-10-CM

## 2021-10-28 DIAGNOSIS — E78.5 HYPERLIPIDEMIA, UNSPECIFIED HYPERLIPIDEMIA TYPE: ICD-10-CM

## 2021-10-28 DIAGNOSIS — Z12.31 SCREENING MAMMOGRAM, ENCOUNTER FOR: ICD-10-CM

## 2021-10-28 DIAGNOSIS — Z86.39 HISTORY OF HYPOTHYROIDISM: ICD-10-CM

## 2021-10-28 DIAGNOSIS — N18.30 STAGE 3 CHRONIC KIDNEY DISEASE, UNSPECIFIED WHETHER STAGE 3A OR 3B CKD: ICD-10-CM

## 2021-10-28 DIAGNOSIS — R79.89 ELEVATED SERUM CREATININE: ICD-10-CM

## 2021-10-28 PROBLEM — J20.8 ACUTE BRONCHITIS DUE TO OTHER SPECIFIED ORGANISMS: Status: RESOLVED | Noted: 2019-01-11 | Resolved: 2021-10-28

## 2021-10-28 PROBLEM — R32 URINARY INCONTINENCE: Status: ACTIVE | Noted: 2021-10-28

## 2021-10-28 PROCEDURE — 99214 OFFICE O/P EST MOD 30 MIN: CPT | Performed by: INTERNAL MEDICINE

## 2021-10-28 PROCEDURE — 99213 OFFICE O/P EST LOW 20 MIN: CPT | Performed by: INTERNAL MEDICINE

## 2021-10-28 RX ORDER — DEXTROAMPHETAMINE SACCHARATE, AMPHETAMINE ASPARTATE, DEXTROAMPHETAMINE SULFATE AND AMPHETAMINE SULFATE 7.5; 7.5; 7.5; 7.5 MG/1; MG/1; MG/1; MG/1
TABLET ORAL
COMMUNITY
Start: 2021-09-22 | End: 2022-09-06

## 2021-10-28 RX ORDER — OXYBUTYNIN CHLORIDE 5 MG/1
5 TABLET, EXTENDED RELEASE ORAL DAILY
Qty: 30 TABLET | Refills: 1 | Status: SHIPPED | OUTPATIENT
Start: 2021-10-28 | End: 2021-12-01 | Stop reason: DRUGHIGH

## 2021-10-28 RX ORDER — OXYBUTYNIN CHLORIDE 5 MG/1
5 TABLET, EXTENDED RELEASE ORAL EVERY EVENING
Status: DISCONTINUED | OUTPATIENT
Start: 2021-10-28 | End: 2021-10-28

## 2021-10-28 ASSESSMENT — FIBROSIS 4 INDEX: FIB4 SCORE: 0.59

## 2021-10-30 NOTE — PROGRESS NOTES
Subjective:   Kristen Carrera is a 71 y.o. female here today for ER follow up, urinary incontinence    Urinary incontinence  She was recently seen in the emergency room for her symptoms concerning for a UTI.  When she went for her evaluation, she had already taken a full week of antibiotics that she had leftover from a different issue.  She is not sure which antibiotic she took however she had a fairly normal-looking UA and was not prescribed any additional treatment.  She reports that the lower abdominal pain she was having has nearly completely resolved but she continues to have problems with urinary leakage.  Prior to a few weeks ago, she would have intermittent episodes of urge incontinence where she had to rush to the bathroom but she was typically able to hold it until she made it to the bathroom.  She states that after this infection, she can no longer hold it and she is having puddles of urine and dripping shortly after she feels the sensation that she needs to urinate.  This was going on when she went to the emergency room.  She denies any hematuria or dysuria currently.  Denies fevers and chills.    CKD (chronic kidney disease) stage 3, GFR 30-59 ml/min ([facility])  She has a history of CKD 3 however this has been somewhat intermittent based on her labs.  Her most recent blood work did show a reduction in GFR compared to previous however she also had a possible recovery UTI versus low nephritis at that time.         Current medicines (including changes today)  Current Outpatient Medications   Medication Sig Dispense Refill   • oxybutynin SR (DITROPAN-XL) 5 MG TABLET SR 24 HR Take 1 Tablet by mouth every day. 30 Tablet 1   • amphetamine-dextroamphetamine (ADDERALL) 30 MG tablet TAKE 1 TABLET BY MOUTH EVERY DAY IN THE AFTERNOON AS NEEDED     • amphetamine-dextroamphetamine ER (ADDERALL XR) 30 MG XR capsule      • amphetamine-dextroamphetamine (ADDERALL) 10 MG Tab      • PRISTIQ 100 MG TABLET SR 24 HR      • zolpidem  (AMBIEN) 10 MG TABS Take 10 mg by mouth at bedtime as needed. Indications: Trouble Sleeping       No current facility-administered medications for this visit.     She  has a past medical history of ADD (attention deficit disorder), Anxiety, Arthritis, CTS (carpal tunnel syndrome), Depression, History of abdominoplasty (3/15/2012), Hyperlipidemia, Hypothyroidism, Midline low back pain without sciatica (8/4/2015), Osteoporosis (8/19/2014), and PATENT FORAMEN OVALE LEFT TO RIGHT SHUNT (12/8/2010).    ROS   Denies chest pain, shortness of breath  As above in HPI     Objective:     Vitals:    10/28/21 1621   BP: 118/78   Pulse: 78   Resp: 16   Temp: 36.5 °C (97.7 °F)   SpO2: 99%     Body mass index is 27.25 kg/m².   Physical Exam:  Constitutional: Alert, no distress.  Skin: Warm, dry, good turgor, no rashes in visible areas.  Respiratory: Unlabored respiratory effort, lungs clear to auscultation, no wheezes, no ronchi.  Cardiovascular: Regular rate and rhythm, no murmurs appreciated, no lower extremity edema  Abdomen: Soft, non-tender, no masses, no hepatosplenomegaly.  Psych: Alert and oriented x3, normal affect and mood.      Results and Imaging:   Lab Results   Component Value Date/Time    WBC 12.5 (H) 10/21/2021 03:04 PM    RBC 3.92 (L) 10/21/2021 03:04 PM    HEMOGLOBIN 12.2 10/21/2021 03:04 PM    HEMATOCRIT 35.8 (L) 10/21/2021 03:04 PM    MCV 91.3 10/21/2021 03:04 PM    MCH 31.1 10/21/2021 03:04 PM    MCHC 34.1 10/21/2021 03:04 PM    MPV 9.7 10/21/2021 03:04 PM    NEUTSPOLYS 73.00 (H) 10/21/2021 03:04 PM    LYMPHOCYTES 13.10 (L) 10/21/2021 03:04 PM    MONOCYTES 10.40 10/21/2021 03:04 PM    EOSINOPHILS 0.90 10/21/2021 03:04 PM    BASOPHILS 0.00 10/21/2021 03:04 PM    HYPOCHROMIA 1+ 04/21/2013 09:04 PM      Results for CONCHITA CHAVEZ (MRN [id number]) as of 10/30/2021 08:17   Ref. Range 10/21/2021 15:04 10/21/2021 15:20   Sodium Latest Ref Range: 135 - 145 mmol/L 135    Potassium Latest Ref Range: 3.6 - 5.5 mmol/L 3.4 (L)     Chloride Latest Ref Range: 96 - 112 mmol/L 97    Co2 Latest Ref Range: 20 - 33 mmol/L 24    Anion Gap Latest Ref Range: 7.0 - 16.0  14.0    Glucose Latest Ref Range: 65 - 99 mg/dL 118 (H)    Bun Latest Ref Range: 8 - 22 mg/dL 16    Creatinine Latest Ref Range: 0.50 - 1.40 mg/dL 1.14    GFR If  Latest Ref Range: >60 mL/min/1.73 m 2 57 (A)    GFR If Non  Latest Ref Range: >60 mL/min/1.73 m 2 47 (A)    Calcium Latest Ref Range: 8.5 - 10.5 mg/dL 10.1    AST(SGOT) Latest Ref Range: 12 - 45 U/L 14    ALT(SGPT) Latest Ref Range: 2 - 50 U/L 12    Alkaline Phosphatase Latest Ref Range: 30 - 99 U/L 76    Total Bilirubin Latest Ref Range: 0.1 - 1.5 mg/dL 0.4    Albumin Latest Ref Range: 3.2 - 4.9 g/dL 3.5    Total Protein Latest Ref Range: 6.0 - 8.2 g/dL 7.5    Globulin Latest Ref Range: 1.9 - 3.5 g/dL 4.0 (H)    A-G Ratio Latest Units: g/dL 0.9    Lipase Latest Ref Range: 11 - 82 U/L 32    Urobilinogen, Urine Latest Ref Range: Negative   0.2   Color Unknown  Yellow   Character Unknown  Clear   Specific Gravity Latest Ref Range: <1.035   1.016   Ph Latest Ref Range: 5.0 - 8.0   5.5   Glucose Latest Ref Range: Negative mg/dL  Negative   Ketones Latest Ref Range: Negative mg/dL  Trace (A)   Bilirubin Latest Ref Range: Negative   Negative   Occult Blood Latest Ref Range: Negative   Negative   Protein Latest Ref Range: Negative mg/dL  Negative   Nitrite Latest Ref Range: Negative   Negative   Leukocyte Esterase Latest Ref Range: Negative   Trace (A)   Micro Urine Req Unknown  Microscopic   WBC Latest Units: /hpf  5-10 (A)   RBC Latest Units: /hpf  2-5 (A)   Epithelial Cells Latest Units: /hpf  Few   Bacteria Latest Ref Range: None /hpf  Negative   Hyaline Cast Latest Units: /lpf  3-5 (A)   Significant Indicator Unknown  NEG   Site Unknown  -   Source Unknown  UR       Assessment and Plan:   The following treatment plan was discussed    1. Urge incontinence of urine  Unclear why her incontinence  was so exacerbated by her recent likely bladder infection.  Low suspicion that she continues to have an infection given recent bland UA and negative urine culture done when she went to the ER on 10/21 at which time she was having all of the same symptoms.  We will start her on low-dose oxybutynin to see if this helps with some of her urge symptoms.  We will see her for follow up in 4 weeks.  If she does not respond, could consider up titrating dose/urology referral if new symptoms develop.  - oxybutynin SR (DITROPAN-XL) 5 MG TABLET SR 24 HR; Take 1 Tablet by mouth every day.  Dispense: 30 Tablet; Refill: 1    2. Elevated serum creatinine  We will obtain updated labs now that her infection has resolved  - Basic Metabolic Panel; Future    3. Hyperlipidemia, unspecified hyperlipidemia type  - Lipid Profile; Future    4. History of hypothyroidism  - TSH WITH REFLEX TO FT4; Future    5. Screening mammogram, encounter for  - MA-SCREENING MAMMO BILAT W/TOMOSYNTHESIS W/CAD; Future    6. Stage 3 chronic kidney disease, unspecified whether stage 3a or 3b CKD (HCC)  As above, repeat labs        Followup: Return in about 4 weeks (around 11/25/2021) for urinary symptoms.

## 2021-10-30 NOTE — ASSESSMENT & PLAN NOTE
She was recently seen in the emergency room for her symptoms concerning for a UTI.  When she went for her evaluation, she had already taken a full week of antibiotics that she had leftover from a different issue.  She is not sure which antibiotic she took however she had a fairly normal-looking UA and was not prescribed any additional treatment.  She reports that the lower abdominal pain she was having has nearly completely resolved but she continues to have problems with urinary leakage.  Prior to a few weeks ago, she would have intermittent episodes of urge incontinence where she had to rush to the bathroom but she was typically able to hold it until she made it to the bathroom.  She states that after this infection, she can no longer hold it and she is having puddles of urine and dripping shortly after she feels the sensation that she needs to urinate.  This was going on when she went to the emergency room.  She denies any hematuria or dysuria currently.  Denies fevers and chills.

## 2021-10-30 NOTE — ASSESSMENT & PLAN NOTE
She has a history of CKD 3 however this has been somewhat intermittent based on her labs.  Her most recent blood work did show a reduction in GFR compared to previous however she also had a possible recovery UTI versus low nephritis at that time.

## 2021-12-01 ENCOUNTER — OFFICE VISIT (OUTPATIENT)
Dept: MEDICAL GROUP | Facility: MEDICAL CENTER | Age: 71
End: 2021-12-01
Attending: INTERNAL MEDICINE
Payer: MEDICARE

## 2021-12-01 VITALS
HEIGHT: 62 IN | DIASTOLIC BLOOD PRESSURE: 74 MMHG | RESPIRATION RATE: 16 BRPM | OXYGEN SATURATION: 97 % | BODY MASS INDEX: 28.01 KG/M2 | WEIGHT: 152.2 LBS | SYSTOLIC BLOOD PRESSURE: 124 MMHG | TEMPERATURE: 97.1 F | HEART RATE: 83 BPM

## 2021-12-01 DIAGNOSIS — N39.41 URGE INCONTINENCE OF URINE: ICD-10-CM

## 2021-12-01 DIAGNOSIS — G47.10 HYPERSOMNIA: ICD-10-CM

## 2021-12-01 PROBLEM — R03.0 ELEVATED BLOOD PRESSURE READING: Status: RESOLVED | Noted: 2018-10-18 | Resolved: 2021-12-01

## 2021-12-01 PROCEDURE — 99212 OFFICE O/P EST SF 10 MIN: CPT | Performed by: INTERNAL MEDICINE

## 2021-12-01 PROCEDURE — 99214 OFFICE O/P EST MOD 30 MIN: CPT | Performed by: INTERNAL MEDICINE

## 2021-12-01 RX ORDER — OXYBUTYNIN CHLORIDE 10 MG/1
10 TABLET, EXTENDED RELEASE ORAL DAILY
Qty: 30 TABLET | Refills: 2 | Status: SHIPPED | OUTPATIENT
Start: 2021-12-01 | End: 2021-12-02 | Stop reason: SDUPTHER

## 2021-12-01 ASSESSMENT — FIBROSIS 4 INDEX: FIB4 SCORE: 0.59

## 2021-12-02 DIAGNOSIS — N39.41 URGE INCONTINENCE OF URINE: ICD-10-CM

## 2021-12-02 RX ORDER — OXYBUTYNIN CHLORIDE 10 MG/1
10 TABLET, EXTENDED RELEASE ORAL DAILY
Qty: 90 TABLET | Refills: 0 | Status: SHIPPED | OUTPATIENT
Start: 2021-12-02 | End: 2022-02-03

## 2021-12-02 NOTE — ASSESSMENT & PLAN NOTE
She continues to report urinary incontinence.  At her last visit we started her on a very low-dose of oxybutynin (5 mg extended release) which she states has not helped very much.  She still noticing daily urinary leakage and inability to get to the bathroom in time.  She is also waking up multiple times at night.  She denies any dysuria or hematuria.

## 2021-12-02 NOTE — ASSESSMENT & PLAN NOTE
Has been diagnosed with hypersomnolence over 12 years ago.  For this she is currently taking Adderall which is prescribed by Dr. Abel.  Her original provider was Dr. Pham.  She is interested in establishing with a new sleep medicine provider because her hypersomnolence has been getting worse.  She states that over the past week she has slept over 20 hours every day.  She is requesting a referral.

## 2021-12-02 NOTE — PROGRESS NOTES
Subjective:   Kristen Carrera is a 71 y.o. female here today for f/u incontinence, requesting referral    Urinary incontinence  She continues to report urinary incontinence.  At her last visit we started her on a very low-dose of oxybutynin (5 mg extended release) which she states has not helped very much.  She still noticing daily urinary leakage and inability to get to the bathroom in time.  She is also waking up multiple times at night.  She denies any dysuria or hematuria.    Hypersomnolence  Has been diagnosed with hypersomnolence over 12 years ago.  For this she is currently taking Adderall which is prescribed by Dr. Abel.  Her original provider was Dr. Pham.  She is interested in establishing with a new sleep medicine provider because her hypersomnolence has been getting worse.  She states that over the past week she has slept over 20 hours every day.  She is requesting a referral.       Current medicines (including changes today)  Current Outpatient Medications   Medication Sig Dispense Refill   • oxybutynin SR (DITROPAN-XL) 10 MG CR tablet Take 1 Tablet by mouth every day. 30 Tablet 2   • amphetamine-dextroamphetamine (ADDERALL) 30 MG tablet TAKE 1 TABLET BY MOUTH EVERY DAY IN THE AFTERNOON AS NEEDED     • amphetamine-dextroamphetamine ER (ADDERALL XR) 30 MG XR capsule      • PRISTIQ 100 MG TABLET SR 24 HR      • zolpidem (AMBIEN) 10 MG TABS Take 10 mg by mouth at bedtime as needed. Indications: Trouble Sleeping     • amphetamine-dextroamphetamine (ADDERALL) 10 MG Tab  (Patient not taking: Reported on 12/1/2021)       No current facility-administered medications for this visit.     She  has a past medical history of ADD (attention deficit disorder), Anxiety, Arthritis, CTS (carpal tunnel syndrome), Depression, History of abdominoplasty (3/15/2012), Hyperlipidemia, Hypothyroidism, Midline low back pain without sciatica (8/4/2015), Osteoporosis (8/19/2014), and PATENT FORAMEN OVALE LEFT TO RIGHT SHUNT  (12/8/2010).    ROS   Denies chest pain, shortness of breath  As above in HPI     Objective:     Vitals:    12/01/21 1408   BP: 124/74   Pulse: 83   Resp: 16   Temp: 36.2 °C (97.1 °F)   SpO2: 97%     Body mass index is 27.84 kg/m².   Physical Exam:  Constitutional: Alert, no distress.  Skin: Warm, dry, good turgor, no rashes in visible areas.  Eye: Equal, round and reactive, conjunctiva clear, lids normal.  Psych: Alert and oriented x3, normal affect and mood.      Assessment and Plan:   The following treatment plan was discussed    1. Urge incontinence of urine  Did not respond to low-dose of oxybutynin.  Will cautiously uptitrate to 10 mg given her age but also set her up with urology for post void residual testing/further diagnosis and management  - oxybutynin SR (DITROPAN-XL) 10 MG CR tablet; Take 1 Tablet by mouth every day.  Dispense: 30 Tablet; Refill: 2  - Referral to Urology    2. Hypersomnolence  She remains on Adderall prescribed by her psychiatrist however feels like her symptoms are worsening and she would like to reestablish with a sleep medicine provider.  New referral placed.  - Referral to Pulmonary and Sleep Medicine        Followup: Return if symptoms worsen or fail to improve.

## 2021-12-02 NOTE — TELEPHONE ENCOUNTER
Received request via: Pharmacy    Was the patient seen in the last year in this department? Yes    Does the patient have an active prescription (recently filled or refills available) for medication(s) requested? Yes     Patient requesting a 90 day supply

## 2022-02-03 ENCOUNTER — OFFICE VISIT (OUTPATIENT)
Dept: SLEEP MEDICINE | Facility: MEDICAL CENTER | Age: 72
End: 2022-02-03
Payer: MEDICARE

## 2022-02-03 VITALS
RESPIRATION RATE: 16 BRPM | DIASTOLIC BLOOD PRESSURE: 88 MMHG | HEIGHT: 61 IN | WEIGHT: 152 LBS | SYSTOLIC BLOOD PRESSURE: 144 MMHG | BODY MASS INDEX: 28.7 KG/M2 | HEART RATE: 91 BPM | OXYGEN SATURATION: 96 %

## 2022-02-03 DIAGNOSIS — G47.11 IDIOPATHIC HYPERSOMNIA: ICD-10-CM

## 2022-02-03 DIAGNOSIS — G47.19 EXCESSIVE DAYTIME SLEEPINESS: Primary | ICD-10-CM

## 2022-02-03 DIAGNOSIS — G47.30 SLEEP DISORDER BREATHING: ICD-10-CM

## 2022-02-03 PROCEDURE — 99204 OFFICE O/P NEW MOD 45 MIN: CPT | Performed by: STUDENT IN AN ORGANIZED HEALTH CARE EDUCATION/TRAINING PROGRAM

## 2022-02-03 ASSESSMENT — FIBROSIS 4 INDEX: FIB4 SCORE: 0.59

## 2022-02-03 NOTE — PROGRESS NOTES
[facility] Consult Note     Date: 2/3/2022 / Time: 2:13 PM      Thank you for requesting a sleep medicine consultation on Kristen Carrera at the sleep center. Presents today with the chief complaints of excessive daytime sleepiness. She is referred by Binta To M.D.  [address] [id number] for evaluation and treatment of hypersomnia.    HISTORY OF PRESENT ILLNESS:     Kristen Carrera is a 71 y.o. female with MDD, insomnia, CKD stage III, idiopathic hypersomnia.  Presents to Sleep Clinic for evaluation of daytime sleepiness.    She underwent PSG MSLT testing at outside facility with Dr. Pham.  PSG 10/20/2011 showed overall AHI of 4.2.  Home sleep testing via apnea link 8/21/2011 showed overall AHI of 10.  Previous MSLT in 2011 showed a Mean Sleep latency of 2.8 min on 5 naps.     Following diagnosis she was placed on different stimulants over the years.  States only stimulant she has found that helped her is Adderall.  Currently taking 30 mg of Adderall extended release and 30 mg of Adderall immediate release daily.  This is prescribed by her behavioral health specialist.  In addition she is also taking Ambien 10 mg at night as needed for sleep.  States with taking Ambien this allows her to regulate her sleep schedule more effectively.  Without Ambien she feels her sleep patterns become more irregular.  This is also prescribed by her behavioral health specialist.    She does report a history of sleepwalking with Ambien.  States she woke to find in the morning her front door unlocked and open partially.  Another time she found remnants of cracked eggs in the kitchen as if she was trying to make a meal.  She did not remember any of these instances occurring while they were happening.  She lives alone and there is no one to tell her how often she is sleepwalking.    Her main concern right now is her daytime sleepiness.  She feels she can sleep 24 hours and still not feel rested.   "She continues to be tired at times during the day.  She does have a dry mouth at night which persist throughout the day.  She has low energy during the day and trouble concentrating.  She does not nap regularly.    As per supplemental questionnaire to be scanned or imported into chart:    [place] Sleepiness Score: 16    Sleep Schedule  Bedtime: Weekday & Weekend 10 pm   Wake time: Weekday & Weekend 12pm  Sleep-onset latency: 20-30 min   Awakenings from sleep: 2-3  Difficulty falling back asleep: none   Bedroom partner: None   Naps: No     DAYTIME SYMPTOMS:   Excessive daytime sleepiness: Yes  Daytime fatigue: Yes  Difficulty concentrating: Yes  Memory problems: Yes  Irritability:Yes  Work/school performance issues: No   Sleepiness with driving: No   Caffeine/stimulant use: No   Alcohol use:No     SLEEP RELATED SYMPTOMS  Snoring: No   Witnessed apnea or gasping/choking: No   Dry mouth or mouth breathing: Yes  Sweating: Yes  Teeth grinding/biting: No   Morning headaches: Yes  Refreshed Upon Awakening: No      SLEEP RELATED BEHAVIORS:  Parasomnias (walking, talking, eating, violence): Yes, Sleep cooking in the past. Once unlocked front door at night   Leg kicking: No   Restless legs - \"urge to move\": No   Nightmares: No  Recurrent: No   Dream enactment: No      NARCOLEPSY:  Cataplexy: No   Sleep paralysis: Yes cant move a couple times a month.   Sleep attacks: No   Hypnagogic/hypnopompic hallucinations: No     MEDICAL HISTORY  Past Medical History:   Diagnosis Date   • ADD (attention deficit disorder)    • Anxiety    • Arthritis    • CTS (carpal tunnel syndrome)     B/L   • Depression    • History of abdominoplasty 3/15/2012   • Hyperlipidemia    • Hypothyroidism    • Midline low back pain without sciatica 8/4/2015   • Osteoporosis 8/19/2014   • PATENT FORAMEN OVALE LEFT TO RIGHT SHUNT 12/8/2010        SURGICAL HISTORY  Past Surgical History:   Procedure Laterality Date   • ABDOMINOPLASTY  1977   • APPENDECTOMY     • " OTHER      TUMMY TUCK, BREAST REDUCTION, LIPOSUCTION OF HIPS AND THIGHS.   • PB MAMMARY DUCTOGRAM, SINGLE     • NM ANESTH,LOLA HYST FOL NEURAXIAL ANAL/ANES     • NM REDUCTION OF BREAST     • TONSILLECTOMY     • TUBAL COAGULATION LAPAROSCOPIC BILATERAL          FAMILY HISTORY  Family History   Problem Relation Age of Onset   • Hypertension Mother    • Cancer Mother         UTERUS   • Heart Disease Mother         Arteriosclerotic Cardiovascular Disease   • Other Mother         Coronary Artery Bypass Graft   • Psychiatric Illness Mother    • Arthritis Mother    • Cancer Father         PANCREATIC   • Cancer Sister 51        BREAST   • Breast Cancer Sister    • Stroke Neg Hx    • Diabetes Neg Hx    • Lung Disease Neg Hx        SOCIAL HISTORY  Social History     Socioeconomic History   • Marital status:      Spouse name: Not on file   • Number of children: Not on file   • Years of education: Not on file   • Highest education level: Not on file   Occupational History   • Not on file   Tobacco Use   • Smoking status: Never Smoker   • Smokeless tobacco: Never Used   Vaping Use   • Vaping Use: Never used   Substance and Sexual Activity   • Alcohol use: Yes     Comment: rare   • Drug use: Yes     Types: Marijuana, Methamphetamines     Comment: marijuana several times a year, H/O METHAMPHETAMINE FOR 17 YRS.  QUIT 2008.   • Sexual activity: Not Currently     Birth control/protection: Post-Menopausal   Other Topics Concern   • Not on file   Social History Narrative   • Not on file     Social Determinants of Health     Financial Resource Strain:    • Difficulty of Paying Living Expenses: Not on file   Food Insecurity:    • Worried About Running Out of Food in the Last Year: Not on file   • Ran Out of Food in the Last Year: Not on file   Transportation Needs:    • Lack of Transportation (Medical): Not on file   • Lack of Transportation (Non-Medical): Not on file   Physical Activity:    • Days of Exercise per Week: Not on  file   • Minutes of Exercise per Session: Not on file   Stress:    • Feeling of Stress : Not on file   Social Connections:    • Frequency of Communication with Friends and Family: Not on file   • Frequency of Social Gatherings with Friends and Family: Not on file   • Attends Orthodox Services: Not on file   • Active Member of Clubs or Organizations: Not on file   • Attends Club or Organization Meetings: Not on file   • Marital Status: Not on file   Intimate Partner Violence:    • Fear of Current or Ex-Partner: Not on file   • Emotionally Abused: Not on file   • Physically Abused: Not on file   • Sexually Abused: Not on file   Housing Stability:    • Unable to Pay for Housing in the Last Year: Not on file   • Number of Places Lived in the Last Year: Not on file   • Unstable Housing in the Last Year: Not on file        Occupation: Not working     CURRENT MEDICATIONS  Current Outpatient Medications   Medication Sig Dispense Refill   • amphetamine-dextroamphetamine (ADDERALL) 30 MG tablet TAKE 1 TABLET BY MOUTH EVERY DAY IN THE AFTERNOON AS NEEDED     • amphetamine-dextroamphetamine ER (ADDERALL XR) 30 MG XR capsule      • PRISTIQ 100 MG TABLET SR 24 HR      • zolpidem (AMBIEN) 10 MG TABS Take 10 mg by mouth at bedtime as needed. Indications: Trouble Sleeping     • oxybutynin SR (DITROPAN-XL) 10 MG CR tablet Take 1 Tablet by mouth every day. (Patient not taking: Reported on 2/3/2022) 90 Tablet 0   • amphetamine-dextroamphetamine (ADDERALL) 10 MG Tab  (Patient not taking: Reported on 12/1/2021)       No current facility-administered medications for this visit.       REVIEW OF SYSTEMS  Constitutional: Denies fevers, Denies weight changes  Ears/Nose/Throat/Mouth: Denies nasal congestion or sore throat   Cardiovascular: Denies chest pain  Respiratory: Denies shortness of breath, Denies cough  Gastrointestinal/Hepatic: Denies nausea, vomiting  Sleep: see HPI    Physical Examination:  Vitals/ General Appearance:  "  Weight/BMI: Body mass index is 28.72 kg/m².  /88 (BP Location: Left arm, Patient Position: Sitting, BP Cuff Size: Adult)   Pulse 91   Resp 16   Ht 1.549 m (5' 1\")   Wt 68.9 kg (152 lb)   SpO2 96%   Vitals:    02/03/22 1405   BP: 144/88   BP Location: Left arm   Patient Position: Sitting   BP Cuff Size: Adult   Pulse: 91   Resp: 16   SpO2: 96%   Weight: 68.9 kg (152 lb)   Height: 1.549 m (5' 1\")       Pt. is alert and oriented to time, place and person. Cooperative and in no apparent distress.     Constitutional: Alert, no distress, well-groomed.  Skin: No rashes in visible areas.  Eye: Round. Conjunctiva clear, lids normal. No icterus.   ENT EXAM  Nasal alae/valves collapsible: No   Nasal septum deviation: Yes  Nasal turbinate hypertrophy: Left: Grade 1   Right: Grade 1  Hard palate narrow: No   Hard palate high: No   Soft palate/uvula (Mallampati score): 3  Tongue Scalloping: No   Retrognathia: No   Micrognathia: No   Cardiovascular:no murmus/gallops/rubs, normal S1 and S2 heart sounds, regular rate and rhythm  Pulmonary:Clear to auscultation, No wheezes, No crackles.  Neurologic:Awake, alert and oriented x 3, Normal age appropriate gait, No involuntary motions.  Extremities: No clubbing, cyanosis, or edema       ASSESSMENT AND PLAN   Kristen Carrera is a 71 y.o. female with MDD, insomnia, CKD stage III, idiopathic hypersomnia.  Presents to Sleep Clinic for evaluation of daytime sleepiness.    1. Kristen Carrera  has symptoms of Obstructive Sleep Apnea (MAX). Kristen Carrera has symptoms of dry mouth, morning headaches, unrefreshed upon awakening, daytime sleepiness. These  interfere with activities of daily living.   ESS 16  Pt has risk factors for MAX include age, overweight and crowded oropharynx.     The pathophysiology of MAX and the increased risk of cardiovascular morbidity from untreated MAX is discussed in detail with the patient. She  also has idiopathic hypersomnia and depression which can be " worsened by MAX.      We have discussed diagnostic options including in-laboratory, attended polysomnography and home sleep testing. We have also discussed treatment options including airway pressurization, reconstructive otolaryngologic surgery, dental appliances and weight management.     Subsequently,treatment options will be discussed based on the diagnostic study. Meanwhile, She is urged to avoid supine sleep, weight gain and alcoholic beverages since all of these can worsen MAX. She is cautioned against drowsy driving. If She feels sleepy while driving, advised must pull over for a break/nap, rather than persist on the road, in the interest of Pt's own safety and that of others on the road.    Plan  -  She  will be scheduled for an overnight PSG to assess sleep related breathing disorder.  - Follow up 1-2 weeks after sleep study to discuss results and treatment options moving forward   -Advised to reach out via MyChart or by phone with any questions or concerns.     2.  Idiopathic hypersomnia  She continues to be sleepy during the day despite 60 mg of Adderall usage daily.  Her sleep is not refreshing this may be due to untreated sleep apnea.  Previous home testing in 2011 did show elevated RDI however PSG showed borderline RDI.  She would benefit from being reevaluated for sleep apnea as this may be contributing to her daytime sleepiness.  In terms of medication for her idiopathic hypersomnia may benefit from trying different medication such as Xywav (Ca, Mg, K, Na Oxybate) which has recently been approved for idiopathic hypersomnia.  This would help her also regulate her sleep cycle as it will make her sleepy.  The concern in her case would be her depression as ideally her behavioral medicine specialist would be in coordination and cooperation in starting medication.  There is a potential for worsening her depression with starting sodium oxybate.     Advised on risk of taking Ambien.  Recommended she wean  off of Ambien 1 due to her age and secondly due to her parasomnias.  Advise she stop Ambien immediately due to her parasomnias which could be detrimental to her health and cause physical harm.  She is reluctant to wean off of the Ambien as she feels this will this regulate her sleep cycle.  She is aware of risks.  Advised that recommended max dosing for women is 5 mg.    Plan  -Await sleep study results to make sure sleep apnea is contributing to daytime sleepiness  -Recommended to try to cut down on Adderall during the day as she is on very high doses.  This is being prescribed by her behavioral medicine specialist       regarding treatment of other past medical problems and general health maintenance,  Pt is urged to follow up with PCP.    Please note portions of this record was created using voice recognition software. I have made every reasonable attempt to correct obvious errors, but I expect that there are errors of grammar and possibly content I did not discover before finalizing the note.

## 2022-02-23 ENCOUNTER — APPOINTMENT (OUTPATIENT)
Dept: SLEEP MEDICINE | Facility: MEDICAL CENTER | Age: 72
End: 2022-02-23
Attending: STUDENT IN AN ORGANIZED HEALTH CARE EDUCATION/TRAINING PROGRAM
Payer: MEDICARE

## 2022-02-23 ENCOUNTER — TELEPHONE (OUTPATIENT)
Dept: SLEEP MEDICINE | Facility: MEDICAL CENTER | Age: 72
End: 2022-02-23

## 2022-02-24 NOTE — TELEPHONE ENCOUNTER
1. Caller Name: Kristen                        Call Back Number: [phone] (home)       How would the patient prefer to be contacted with a response: Phone call OK to leave a detailed message    Sleep Study     Kristen called she got a flat tire on her way to her sleep study. She would like someone to call her in the morning to reschedule her. [phone] (home) .    Thank you!

## 2022-03-15 ENCOUNTER — APPOINTMENT (OUTPATIENT)
Dept: SLEEP MEDICINE | Facility: MEDICAL CENTER | Age: 72
End: 2022-03-15
Payer: MEDICARE

## 2022-04-26 ENCOUNTER — HOSPITAL ENCOUNTER (OUTPATIENT)
Facility: MEDICAL CENTER | Age: 72
End: 2022-04-26
Attending: STUDENT IN AN ORGANIZED HEALTH CARE EDUCATION/TRAINING PROGRAM
Payer: MEDICARE

## 2022-04-26 PROCEDURE — 87077 CULTURE AEROBIC IDENTIFY: CPT

## 2022-04-26 PROCEDURE — 87186 SC STD MICRODIL/AGAR DIL: CPT

## 2022-04-26 PROCEDURE — 87086 URINE CULTURE/COLONY COUNT: CPT

## 2022-04-27 ENCOUNTER — HOSPITAL ENCOUNTER (OUTPATIENT)
Dept: LAB | Facility: MEDICAL CENTER | Age: 72
End: 2022-04-27
Attending: STUDENT IN AN ORGANIZED HEALTH CARE EDUCATION/TRAINING PROGRAM
Payer: MEDICARE

## 2022-04-27 ENCOUNTER — HOSPITAL ENCOUNTER (OUTPATIENT)
Dept: LAB | Facility: MEDICAL CENTER | Age: 72
End: 2022-04-27
Attending: INTERNAL MEDICINE
Payer: MEDICARE

## 2022-04-27 DIAGNOSIS — E78.5 HYPERLIPIDEMIA, UNSPECIFIED HYPERLIPIDEMIA TYPE: ICD-10-CM

## 2022-04-27 DIAGNOSIS — R79.89 ELEVATED SERUM CREATININE: ICD-10-CM

## 2022-04-27 DIAGNOSIS — Z86.39 HISTORY OF HYPOTHYROIDISM: ICD-10-CM

## 2022-04-27 LAB
ANION GAP SERPL CALC-SCNC: 11 MMOL/L (ref 7–16)
BUN SERPL-MCNC: 22 MG/DL (ref 8–22)
CALCIUM SERPL-MCNC: 10.1 MG/DL (ref 8.5–10.5)
CHLORIDE SERPL-SCNC: 108 MMOL/L (ref 96–112)
CHOLEST SERPL-MCNC: 179 MG/DL (ref 100–199)
CO2 SERPL-SCNC: 24 MMOL/L (ref 20–33)
CREAT SERPL-MCNC: 0.85 MG/DL (ref 0.5–1.4)
FASTING STATUS PATIENT QL REPORTED: NORMAL
GFR SERPLBLD CREATININE-BSD FMLA CKD-EPI: 73 ML/MIN/1.73 M 2
GLUCOSE SERPL-MCNC: 98 MG/DL (ref 65–99)
HDLC SERPL-MCNC: 60 MG/DL
LDLC SERPL CALC-MCNC: 109 MG/DL
POTASSIUM SERPL-SCNC: 4 MMOL/L (ref 3.6–5.5)
SODIUM SERPL-SCNC: 143 MMOL/L (ref 135–145)
TRIGL SERPL-MCNC: 49 MG/DL (ref 0–149)
TSH SERPL DL<=0.005 MIU/L-ACNC: 3.2 UIU/ML (ref 0.38–5.33)

## 2022-04-27 PROCEDURE — 84443 ASSAY THYROID STIM HORMONE: CPT

## 2022-04-27 PROCEDURE — 36415 COLL VENOUS BLD VENIPUNCTURE: CPT

## 2022-04-27 PROCEDURE — 80061 LIPID PANEL: CPT

## 2022-04-27 PROCEDURE — 80048 BASIC METABOLIC PNL TOTAL CA: CPT

## 2022-04-29 LAB
BACTERIA UR CULT: ABNORMAL
BACTERIA UR CULT: ABNORMAL
SIGNIFICANT IND 70042: ABNORMAL
SITE SITE: ABNORMAL
SOURCE SOURCE: ABNORMAL

## 2022-06-27 ENCOUNTER — HOSPITAL ENCOUNTER (OUTPATIENT)
Facility: MEDICAL CENTER | Age: 72
End: 2022-06-27
Attending: STUDENT IN AN ORGANIZED HEALTH CARE EDUCATION/TRAINING PROGRAM
Payer: MEDICARE

## 2022-06-27 PROCEDURE — 87086 URINE CULTURE/COLONY COUNT: CPT

## 2022-06-30 LAB
BACTERIA UR CULT: NORMAL
SIGNIFICANT IND 70042: NORMAL
SITE SITE: NORMAL
SOURCE SOURCE: NORMAL

## 2022-07-10 ENCOUNTER — APPOINTMENT (OUTPATIENT)
Dept: SLEEP MEDICINE | Facility: MEDICAL CENTER | Age: 72
End: 2022-07-10
Attending: STUDENT IN AN ORGANIZED HEALTH CARE EDUCATION/TRAINING PROGRAM
Payer: MEDICAID

## 2022-07-16 ENCOUNTER — SLEEP STUDY (OUTPATIENT)
Dept: SLEEP MEDICINE | Facility: MEDICAL CENTER | Age: 72
End: 2022-07-16
Attending: STUDENT IN AN ORGANIZED HEALTH CARE EDUCATION/TRAINING PROGRAM
Payer: MEDICARE

## 2022-07-16 DIAGNOSIS — G47.19 EXCESSIVE DAYTIME SLEEPINESS: ICD-10-CM

## 2022-07-16 DIAGNOSIS — G47.30 SLEEP DISORDER BREATHING: ICD-10-CM

## 2022-07-16 PROCEDURE — 95810 POLYSOM 6/> YRS 4/> PARAM: CPT | Performed by: STUDENT IN AN ORGANIZED HEALTH CARE EDUCATION/TRAINING PROGRAM

## 2022-07-18 ENCOUNTER — HOSPITAL ENCOUNTER (OUTPATIENT)
Facility: MEDICAL CENTER | Age: 72
End: 2022-07-18
Attending: STUDENT IN AN ORGANIZED HEALTH CARE EDUCATION/TRAINING PROGRAM
Payer: MEDICARE

## 2022-07-18 PROCEDURE — 87086 URINE CULTURE/COLONY COUNT: CPT

## 2022-07-18 NOTE — PROCEDURES
MONTAGE: Standard  STUDY TYPE: Diagnostic    RECORDING TECHNIQUE:   After the scalp was prepared, gold plated electrodes were applied to the scalp according to the International 10-20 System. EEG (electroencephalogram) was continuously monitored from the O1-M2, O2-M1, C3-M2, C4-M1, F3-M2, and F4-M1. EOGs (electrooculograms) were monitored by electrodes placed at the left and right outer canthi. Chin EMG (electromyogram) was monitored by electrodes placed on the mentalis and sub-mentalis muscles. Nasal and oral airflow were monitored using a triple port thermocouple as well as oronasal pressure transducer. Respiratory effort was measured by inductive plethysmography technology employing abdominal and thoracic belts. Blood oxygen saturation and pulse were monitored by pulse oximetry. Heart rhythm was monitored by surface electrocardiogram. Leg EMG was studied using surface electrodes placed on left and right anterior tibialis. A microphone was used to monitor tracheal sounds and snoring. Body position was monitored and documented by technician observation.   SCORING CRITERIA:   A modification of the AASM manual for scoring of sleep and associated events was used. Obstructive apneas were scored by cessation of airflow for at least 10 seconds with continuing respiratory effort. Central apneas were scored by cessation of airflow for at least 10 seconds with no respiratory effort. Hypopneas were scored by a 30% or more reduction in airflow for at least 10 seconds accompanied by arterial oxygen desaturation of 3% or an arousal. For CMS (Medicare) patients, per AASM rule 1B, hypopneas are scored by 30% with mild reduction in airflow for at least 10 seconds accompanied by arterial saturation decreased at 4%.    Study start time was 11:45:38 PM. Diagnostic recording time was 6h 57.5m with a total sleep time of 5h 23.5m resulting in a sleep efficiency of 77.49%%. Sleep latency from the start of the study was 65 minutes and the  latency from sleep to REM was 127 minutes. In total,156 arousals were scored for an arousal index of 28.9.  Respiratory:  There were a total of 76 apneas consisting of 68 obstructive apneas, 0 mixed apneas, and 8 central apneas. A total of 101 hypopneas were scored. The apnea index was 14.10 per hour and the hypopnea index was 18.73 per hour resulting in an overall AHI of 32.83. AHI during rem was 18.6 and AHI while supine was 25.75.  Oximetry:  There was a mean oxygen saturation of 94.0%. The minimum oxygen saturation in NREM was 85.0 % and in REM was 91.0%. The patient spent 2.5 minutes of TST with SaO2 <88%.  Cardiac:  The highest heart rate seen while awake was 100 BPM while the highest heart rate during sleep was 86 BPM with an average sleeping heart rate of 72 BPM.  Limb Movements:  There were a total of 57 PLMs during sleep which resulted in a PLMS index of 10.6. Of these, 39 were associated with arousals which resulted in a PLMS arousal index of 7.2.    Impression:  1.  Severe obstructive sleep apnea overall AHI 32.8  2.  Study started late and did have fragmented sleep in the beginning which led to not meeting split-night protocol  3.  No supine REM sleep was seen  4.  Snoring was noted by technician  5.  Occasional PVCs on EKG    Recommendations:  I recommend that the patient should return for a CPAP/BiPAP titration.  May consider home empiric auto CPAP therapy.    In some cases alternative treatment options may be proven effective in resolving sleep apnea. These options include upper airway surgery, the use of a dental orthotic, weight loss orpositional therapy. Clinical correlation is required. In general patients with sleep apnea are advised to avoid alcohol, sedatives and not to operate a motor vehicle while drowsy.  Untreated sleep apnea increases the risk for cardiovascular and neurovascular disease.

## 2022-07-20 LAB
BACTERIA UR CULT: NORMAL
SIGNIFICANT IND 70042: NORMAL
SITE SITE: NORMAL
SOURCE SOURCE: NORMAL

## 2022-07-26 PROBLEM — G47.33 OSA (OBSTRUCTIVE SLEEP APNEA): Status: ACTIVE | Noted: 2022-07-26

## 2022-08-23 ENCOUNTER — HOSPITAL ENCOUNTER (OUTPATIENT)
Dept: RADIOLOGY | Facility: MEDICAL CENTER | Age: 72
End: 2022-08-23
Attending: INTERNAL MEDICINE
Payer: MEDICARE

## 2022-08-23 DIAGNOSIS — Z12.31 SCREENING MAMMOGRAM, ENCOUNTER FOR: ICD-10-CM

## 2022-08-23 PROCEDURE — 77063 BREAST TOMOSYNTHESIS BI: CPT

## 2022-09-03 ENCOUNTER — HOSPITAL ENCOUNTER (OUTPATIENT)
Dept: RADIOLOGY | Facility: MEDICAL CENTER | Age: 72
End: 2022-09-03
Attending: EMERGENCY MEDICINE
Payer: MEDICARE

## 2022-09-03 DIAGNOSIS — S82.831A OTHER CLOSED FRACTURE OF DISTAL END OF RIGHT FIBULA, INITIAL ENCOUNTER: ICD-10-CM

## 2022-09-03 PROCEDURE — 73700 CT LOWER EXTREMITY W/O DYE: CPT | Mod: RT

## 2022-09-06 ENCOUNTER — OFFICE VISIT (OUTPATIENT)
Dept: SLEEP MEDICINE | Facility: MEDICAL CENTER | Age: 72
End: 2022-09-06
Payer: MEDICARE

## 2022-09-06 VITALS
SYSTOLIC BLOOD PRESSURE: 140 MMHG | WEIGHT: 145 LBS | BODY MASS INDEX: 26.68 KG/M2 | RESPIRATION RATE: 16 BRPM | OXYGEN SATURATION: 98 % | HEIGHT: 62 IN | DIASTOLIC BLOOD PRESSURE: 80 MMHG | HEART RATE: 86 BPM

## 2022-09-06 DIAGNOSIS — I10 HYPERTENSION, UNSPECIFIED TYPE: ICD-10-CM

## 2022-09-06 DIAGNOSIS — Z78.9 NONSMOKER: ICD-10-CM

## 2022-09-06 DIAGNOSIS — G47.33 OBSTRUCTIVE SLEEP APNEA: ICD-10-CM

## 2022-09-06 DIAGNOSIS — G47.10 HYPERSOMNIA: ICD-10-CM

## 2022-09-06 PROCEDURE — 99213 OFFICE O/P EST LOW 20 MIN: CPT | Performed by: NURSE PRACTITIONER

## 2022-09-06 ASSESSMENT — FIBROSIS 4 INDEX: FIB4 SCORE: 0.6

## 2022-09-06 NOTE — PROGRESS NOTES
Chief Complaint   Patient presents with    Follow-Up     last seen 2/3/2022     Results     Sleep Study 7/16/2022        HPI:  Kristen Carrera is a 72 y.o. year old female here today for follow-up on sleep study results. Hx of hypersomnia.  Last OV 2/3/22 Dr. Myers     PSG 7/16/2022 noted sleep efficiency of 77%, sleep latency of 65 minutes with arousal index of 28.9.  Overall AHI 32.8/h noting severe obstructive sleep apnea.  Study started late and patient had fragmented sleep which led to split-night protocol not being met.  No supine REM sleep.  Snoring noted by technician.  She did not use a sleep aid at the study.  Occasional PVCs on EKG.  Reviewed with patient in detail.  Reviewed treatment modalities and she would like to proceed with in lab titration study.    She has a history of hypersomnia managed by Dr. Abel of behavioral health and previously using Adderall 60 mg daily and Ambien at night to keep patient awake during the day but able to initiate sleep at night.  She is no longer on Ambien and now using Adderall 30 mg daily but notes running out of her prescription for the last 2 weeks.  She will sleep greater than 10 hours a day and notes frequent restroom visits every 2 hours when she does her to sleep.  She recently broke her right leg when trying to get into her son's truck.  She also feels she has a rib fracture.  Her leg is in a cast.  She is not taking pain medication on a regular basis but does have a minimal amount available to her.  She denies cardiac or respiratory symptoms today.    She notes previous history of previously trying CPAP and failing therapy.  We reviewed the pathophysiology of untreated sleep apnea and that this is the most effective treatment modality after reviewing her testing and her current symptoms.      ROS: As per HPI and otherwise negative if not stated.    Past Medical History:   Diagnosis Date    ADD (attention deficit disorder)     Anxiety     Arthritis     CTS  (carpal tunnel syndrome)     B/L    Depression     History of abdominoplasty 3/15/2012    Hyperlipidemia     Hypothyroidism     Midline low back pain without sciatica 8/4/2015    Osteoporosis 8/19/2014    PATENT FORAMEN OVALE LEFT TO RIGHT SHUNT 12/8/2010       Past Surgical History:   Procedure Laterality Date    ABDOMINOPLASTY  1977    APPENDECTOMY      OTHER      TUMMY TUCK, BREAST REDUCTION, LIPOSUCTION OF HIPS AND THIGHS.    PB MAMMARY DUCTOGRAM, SINGLE      MI ANESTH,LOLA HYST FOL NEURAXIAL ANAL/ANES      MI BREAST REDUCTION      TONSILLECTOMY      TUBAL COAGULATION LAPAROSCOPIC BILATERAL         Family History   Problem Relation Age of Onset    Hypertension Mother     Cancer Mother         UTERUS    Heart Disease Mother         Arteriosclerotic Cardiovascular Disease    Other Mother         Coronary Artery Bypass Graft    Psychiatric Illness Mother     Arthritis Mother     Cancer Father         PANCREATIC    Cancer Sister 51        BREAST    Breast Cancer Sister     Stroke Neg Hx     Diabetes Neg Hx     Lung Disease Neg Hx        Social History     Socioeconomic History    Marital status:      Spouse name: Not on file    Number of children: Not on file    Years of education: Not on file    Highest education level: Not on file   Occupational History    Not on file   Tobacco Use    Smoking status: Never    Smokeless tobacco: Never   Vaping Use    Vaping Use: Never used   Substance and Sexual Activity    Alcohol use: Yes     Comment: rare    Drug use: Yes     Types: Marijuana, Methamphetamines     Comment: marijuana several times a year, H/O METHAMPHETAMINE FOR 17 YRS.  QUIT 2008.    Sexual activity: Not Currently     Birth control/protection: Post-Menopausal   Other Topics Concern    Not on file   Social History Narrative    Not on file     Social Determinants of Health     Financial Resource Strain: Not on file   Food Insecurity: Not on file   Transportation Needs: Not on file   Physical Activity: Not  "on file   Stress: Not on file   Social Connections: Not on file   Intimate Partner Violence: Not on file   Housing Stability: Not on file       Allergies as of 09/06/2022 - Reviewed 09/06/2022   Allergen Reaction Noted    Hydrocodone  01/05/2009        Vitals:  BP (!) 140/80 (BP Location: Left arm, Patient Position: Sitting, BP Cuff Size: Adult)   Pulse 86   Resp 16   Ht 1.575 m (5' 2\")   Wt 65.8 kg (145 lb)   SpO2 98%     Current medications as of today   Current Outpatient Medications   Medication Sig Dispense Refill    amphetamine-dextroamphetamine ER (ADDERALL XR) 30 MG XR capsule        No current facility-administered medications for this visit.         Physical Exam:   Gen:           Alert and oriented, No apparent distress. Mood and affect appropriate, normal interaction with examiner.  Eyes:          PERRL, EOM intact, sclere white, conjunctive moist.  Ears:          Not examined.   Hearing:     Grossly intact.  Nose:          Normal, no lesions or deformities.  Dentition:    mask  Oropharynx:   mask  Mallampati Classification: mask  Neck:        Supple, trachea midline, no masses.  Respiratory Effort: No intercostal retractions or use of accessory muscles.   Lung Auscultation:      Clear to auscultation bilaterally; no rales, rhonchi or wheezing.  CV:            Regular rate and rhythm. No murmurs, rubs or gallops.  Abd:           Not examined.   Lymphadenopathy: Not examined.  Gait and Station: Normal.  Digits and Nails: No clubbing, cyanosis, petechiae, or nodes.   Cranial Nerves: II-XII grossly intact.  Skin:        No rashes, lesions or ulcers noted.               Ext:           Right leg in cast.      Assessment:  1. Obstructive sleep apnea  Polysomnography Titration      2. Hypersomnia        3. BMI 25.0-25.9,adult        4. Nonsmoker        5. Hypertension, unspecified type            Immunizations:    Flu:recommend in the fall  Pneumovax 23:2018  Prevnar 13:2015  PCV 20: not due  COVID-19: " recommend    Plan:  Patient has severe obstructive sleep apnea.  We will proceed with CPAP/BiPAP titration study now.  Pending results will initiate therapy and place orders.  Results may be sent via [employer] or call the patient.  Discussed sleep hygiene.  May continue her current medications through behavioral health specialist but recommend getting off of these when she acclimates to CPAP therapy.  Follow primary care further health concerns.  Hypertension noted on exam but query if this related to her current right lower leg discomfort from break.  Follow-up in 3 months, sooner if needed.  We will send results via [employer] or call patient to initiate therapy in hopes of a compliance check in the next 3 months.    Please note that this dictation was created using voice recognition software. I have made every reasonable attempt to correct obvious errors, but it is possible there are errors of grammar and possibly content that I did not discover before finalizing the note.

## 2022-09-06 NOTE — LETTER
ELMO Martínez  [facility] Pulmonary Medicine   [address], [address] [place] [address]  Phone: [phone] - Fax:             Encounter Date: 9/6/2022  Provider: ELMO Martínez  Location of Care: [facility] PUL[facility]  [address] [facility] [place] [address]      Patient:   Kristen Carrera   MR Number: [id number]   YOB: 1950     PROGRESS NOTE:  Chief Complaint   Patient presents with   • Follow-Up     last seen 2/3/2022    • Results     Sleep Study 7/16/2022        HPI:  Kristen Carrera is a 72 y.o. year old female here today for follow-up on sleep study results. Hx of hypersomnia.  Last OV 2/3/22 Dr. Myers     PSG 7/16/2022 noted sleep efficiency of 77%, sleep latency of 65 minutes with arousal index of 28.9.  Overall AHI 32.8/h noting severe obstructive sleep apnea.  Study started late and patient had fragmented sleep which led to split-night protocol not being met.  No supine REM sleep.  Snoring noted by technician.  She did not use a sleep aid at the study.  Occasional PVCs on EKG.  Reviewed with patient in detail.  Reviewed treatment modalities and she would like to proceed with in lab titration study.    She has a history of hypersomnia managed by Dr. Abel of behavioral health and previously using Adderall 60 mg daily and Ambien at night to keep patient awake during the day but able to initiate sleep at night.  She is no longer on Ambien and now using Adderall 30 mg daily but notes running out of her prescription for the last 2 weeks.  She will sleep greater than 10 hours a day and notes frequent restroom visits every 2 hours when she does her to sleep.  She recently broke her right leg when trying to get into her son's truck.  She also feels she has a rib fracture.  Her leg is in a cast.  She is not taking pain medication on a regular basis but does have a minimal amount available to her.  She denies cardiac or  respiratory symptoms today.    She notes previous history of previously trying CPAP and failing therapy.  We reviewed the pathophysiology of untreated sleep apnea and that this is the most effective treatment modality after reviewing her testing and her current symptoms.      ROS: As per HPI and otherwise negative if not stated.    Past Medical History:   Diagnosis Date   • ADD (attention deficit disorder)    • Anxiety    • Arthritis    • CTS (carpal tunnel syndrome)     B/L   • Depression    • History of abdominoplasty 3/15/2012   • Hyperlipidemia    • Hypothyroidism    • Midline low back pain without sciatica 8/4/2015   • Osteoporosis 8/19/2014   • PATENT FORAMEN OVALE LEFT TO RIGHT SHUNT 12/8/2010       Past Surgical History:   Procedure Laterality Date   • ABDOMINOPLASTY  1977   • APPENDECTOMY     • OTHER      TUMMY TUCK, BREAST REDUCTION, LIPOSUCTION OF HIPS AND THIGHS.   • PB MAMMARY DUCTOGRAM, SINGLE     • [place] ANESTH,LOLA HYST FOL NEURAXIAL ANAL/ANES     • [place] BREAST REDUCTION     • TONSILLECTOMY     • TUBAL COAGULATION LAPAROSCOPIC BILATERAL         Family History   Problem Relation Age of Onset   • Hypertension Mother    • Cancer Mother         UTERUS   • Heart Disease Mother         Arteriosclerotic Cardiovascular Disease   • Other Mother         Coronary Artery Bypass Graft   • Psychiatric Illness Mother    • Arthritis Mother    • Cancer Father         PANCREATIC   • Cancer Sister 51        BREAST   • Breast Cancer Sister    • Stroke Neg Hx    • Diabetes Neg Hx    • Lung Disease Neg Hx        Social History     Socioeconomic History   • Marital status:      Spouse name: Not on file   • Number of children: Not on file   • Years of education: Not on file   • Highest education level: Not on file   Occupational History   • Not on file   Tobacco Use   • Smoking status: Never   • Smokeless tobacco: Never   Vaping Use   • Vaping Use: Never used   Substance and Sexual Activity   • Alcohol use: Yes      "Comment: rare   • Drug use: Yes     Types: Marijuana, Methamphetamines     Comment: marijuana several times a year, H/O METHAMPHETAMINE FOR 17 YRS.  QUIT 2008.   • Sexual activity: Not Currently     Birth control/protection: Post-Menopausal   Other Topics Concern   • Not on file   Social History Narrative   • Not on file     Social Determinants of Health     Financial Resource Strain: Not on file   Food Insecurity: Not on file   Transportation Needs: Not on file   Physical Activity: Not on file   Stress: Not on file   Social Connections: Not on file   Intimate Partner Violence: Not on file   Housing Stability: Not on file       Allergies as of 09/06/2022 - Reviewed 09/06/2022   Allergen Reaction Noted   • Hydrocodone  01/05/2009        Vitals:  BP (!) 140/80 (BP Location: Left arm, Patient Position: Sitting, BP Cuff Size: Adult)   Pulse 86   Resp 16   Ht 1.575 m (5' 2\")   Wt 65.8 kg (145 lb)   SpO2 98%     Current medications as of today   Current Outpatient Medications   Medication Sig Dispense Refill   • amphetamine-dextroamphetamine ER (ADDERALL XR) 30 MG XR capsule        No current facility-administered medications for this visit.         Physical Exam:   Gen:           Alert and oriented, No apparent distress. Mood and affect appropriate, normal interaction with examiner.  Eyes:          PERRL, EOM intact, sclere white, conjunctive moist.  Ears:          Not examined.   Hearing:     Grossly intact.  Nose:          Normal, no lesions or deformities.  Dentition:    mask  Oropharynx:   mask  Mallampati Classification: mask  Neck:        Supple, trachea midline, no masses.  Respiratory Effort: No intercostal retractions or use of accessory muscles.   Lung Auscultation:      Clear to auscultation bilaterally; no rales, rhonchi or wheezing.  CV:            Regular rate and rhythm. No murmurs, rubs or gallops.  Abd:           Not examined.   Lymphadenopathy: Not examined.  Gait and Station: Normal.  Digits and " Nails: No clubbing, cyanosis, petechiae, or nodes.   Cranial Nerves: II-XII grossly intact.  Skin:        No rashes, lesions or ulcers noted.               Ext:           Right leg in cast.      Assessment:  1. Obstructive sleep apnea  Polysomnography Titration      2. Hypersomnia        3. BMI 25.0-25.9,adult        4. Nonsmoker        5. Hypertension, unspecified type            Immunizations:    Flu:recommend in the fall  Pneumovax 23:2018  Prevnar 13:2015  PCV 20: not due  COVID-19: recommend    Plan:  1. Patient has severe obstructive sleep apnea.  We will proceed with CPAP/BiPAP titration study now.  Pending results will initiate therapy and place orders.  Results may be sent via [employer] or call the patient.  2. Discussed sleep hygiene.  May continue her current medications through behavioral health specialist but recommend getting off of these when she acclimates to CPAP therapy.  3. Follow primary care further health concerns.  Hypertension noted on exam but query if this related to her current right lower leg discomfort from break.  4. Follow-up in 3 months, sooner if needed.  We will send results via [employer] or call patient to initiate therapy in hopes of a compliance check in the next 3 months.    Please note that this dictation was created using voice recognition software. I have made every reasonable attempt to correct obvious errors, but it is possible there are errors of grammar and possibly content that I did not discover before finalizing the note.          Electronically signed by ELMO Martínez  on 09/06/22    Binta To M.D.  [address] [facility]  [address]  [place] [address]  Via In Basket     Harjit Abel N.P.  [address] [facility] [address]  [place] [address]  Via Fax: [phone]

## 2022-09-06 NOTE — PATIENT INSTRUCTIONS
Patient understands the need to use device every night for >4hrs to meet compliance standards for insurance purposes.  Patient understands they may have mask fits within first 30 days of therapy covered by insurance to obtain best fit.  Recommend cleaning mask, water reservoir, and tubing at least once per week with soap and water and wiping down mask with wash cloth or baby wipe once daily to remove oil from face to improve seal. Change filters per  recommendations; every 2-4 weeks.

## 2022-09-09 PROBLEM — S82.851S TRIMALLEOLAR FRACTURE OF ANKLE, CLOSED, RIGHT, SEQUELA: Status: ACTIVE | Noted: 2022-09-09

## 2022-09-21 ENCOUNTER — APPOINTMENT (OUTPATIENT)
Dept: SLEEP MEDICINE | Facility: MEDICAL CENTER | Age: 72
End: 2022-09-21
Attending: NURSE PRACTITIONER
Payer: MEDICARE

## 2022-09-22 ENCOUNTER — TELEPHONE (OUTPATIENT)
Dept: SLEEP MEDICINE | Facility: MEDICAL CENTER | Age: 72
End: 2022-09-22
Payer: MEDICARE

## 2022-09-22 NOTE — TELEPHONE ENCOUNTER
Pt is unable to complete a titration study at home. Testing needs to be done at our sleep lab. Pt notified and informed she would call back in a few weeks to schedule once she feels she is able to complete testing at our facility. Routing as FEDE.

## 2022-09-22 NOTE — TELEPHONE ENCOUNTER
Patient is calling due to having to cancel sleep study two days ago. She broke her foot and is not able to get around. Patient is requesting for this to be done at home, would like an order to be sent for this. Please call back

## 2022-11-07 ENCOUNTER — PATIENT MESSAGE (OUTPATIENT)
Dept: HEALTH INFORMATION MANAGEMENT | Facility: OTHER | Age: 72
End: 2022-11-07

## 2022-12-06 ENCOUNTER — TELEPHONE (OUTPATIENT)
Dept: HEALTH INFORMATION MANAGEMENT | Facility: OTHER | Age: 72
End: 2022-12-06

## 2023-01-09 PROBLEM — F15.20 STIMULANT DEPENDENCE (HCC): Status: ACTIVE | Noted: 2023-01-09

## 2023-01-09 PROBLEM — E66.3 OVERWEIGHT (BMI 25.0-29.9): Status: ACTIVE | Noted: 2023-01-09

## 2023-01-09 PROBLEM — N18.2 CKD (CHRONIC KIDNEY DISEASE) STAGE 2, GFR 60-89 ML/MIN: Status: ACTIVE | Noted: 2018-09-29

## 2023-01-10 ENCOUNTER — DOCUMENTATION (OUTPATIENT)
Dept: HEALTH INFORMATION MANAGEMENT | Facility: OTHER | Age: 73
End: 2023-01-10
Payer: MEDICARE

## 2023-02-02 ENCOUNTER — OFFICE VISIT (OUTPATIENT)
Dept: MEDICAL GROUP | Facility: MEDICAL CENTER | Age: 73
End: 2023-02-02
Attending: INTERNAL MEDICINE
Payer: MEDICARE

## 2023-02-02 ENCOUNTER — HOSPITAL ENCOUNTER (OUTPATIENT)
Facility: MEDICAL CENTER | Age: 73
End: 2023-02-02
Attending: INTERNAL MEDICINE
Payer: MEDICARE

## 2023-02-02 VITALS
HEART RATE: 90 BPM | DIASTOLIC BLOOD PRESSURE: 100 MMHG | TEMPERATURE: 97.9 F | HEIGHT: 62 IN | WEIGHT: 145 LBS | RESPIRATION RATE: 16 BRPM | BODY MASS INDEX: 26.68 KG/M2 | OXYGEN SATURATION: 97 % | SYSTOLIC BLOOD PRESSURE: 154 MMHG

## 2023-02-02 DIAGNOSIS — N89.8 VAGINAL DISCHARGE: ICD-10-CM

## 2023-02-02 DIAGNOSIS — Z86.39 HISTORY OF HYPOTHYROIDISM: ICD-10-CM

## 2023-02-02 DIAGNOSIS — G47.33 OSA (OBSTRUCTIVE SLEEP APNEA): ICD-10-CM

## 2023-02-02 DIAGNOSIS — G47.00 INSOMNIA, UNSPECIFIED TYPE: ICD-10-CM

## 2023-02-02 DIAGNOSIS — N39.41 URGE INCONTINENCE OF URINE: ICD-10-CM

## 2023-02-02 DIAGNOSIS — M85.80 OSTEOPENIA, UNSPECIFIED LOCATION: ICD-10-CM

## 2023-02-02 DIAGNOSIS — R03.0 ELEVATED BLOOD PRESSURE READING: ICD-10-CM

## 2023-02-02 DIAGNOSIS — H54.3 VISION LOSS, BILATERAL: ICD-10-CM

## 2023-02-02 DIAGNOSIS — Z12.11 SCREEN FOR COLON CANCER: ICD-10-CM

## 2023-02-02 DIAGNOSIS — E78.5 HYPERLIPIDEMIA, UNSPECIFIED HYPERLIPIDEMIA TYPE: ICD-10-CM

## 2023-02-02 DIAGNOSIS — Z11.59 SCREENING FOR VIRAL DISEASE: ICD-10-CM

## 2023-02-02 DIAGNOSIS — Z87.81 H/O FRACTURE OF ANKLE: ICD-10-CM

## 2023-02-02 DIAGNOSIS — R41.3 MEMORY LOSS: ICD-10-CM

## 2023-02-02 DIAGNOSIS — F98.8 ATTENTION DEFICIT DISORDER, UNSPECIFIED HYPERACTIVITY PRESENCE: ICD-10-CM

## 2023-02-02 DIAGNOSIS — R53.83 OTHER FATIGUE: ICD-10-CM

## 2023-02-02 PROBLEM — R79.89 ELEVATED SERUM CREATININE: Status: RESOLVED | Noted: 2021-10-28 | Resolved: 2023-02-02

## 2023-02-02 PROBLEM — F15.20 STIMULANT DEPENDENCE (HCC): Status: RESOLVED | Noted: 2023-01-09 | Resolved: 2023-02-02

## 2023-02-02 PROCEDURE — 87510 GARDNER VAG DNA DIR PROBE: CPT

## 2023-02-02 PROCEDURE — 99214 OFFICE O/P EST MOD 30 MIN: CPT | Performed by: INTERNAL MEDICINE

## 2023-02-02 PROCEDURE — 87660 TRICHOMONAS VAGIN DIR PROBE: CPT

## 2023-02-02 PROCEDURE — 87480 CANDIDA DNA DIR PROBE: CPT

## 2023-02-02 PROCEDURE — 99213 OFFICE O/P EST LOW 20 MIN: CPT | Performed by: INTERNAL MEDICINE

## 2023-02-02 RX ORDER — TOLTERODINE 4 MG/1
CAPSULE, EXTENDED RELEASE ORAL
COMMUNITY
Start: 2023-01-28

## 2023-02-02 ASSESSMENT — FIBROSIS 4 INDEX: FIB4 SCORE: 0.6

## 2023-02-02 NOTE — ASSESSMENT & PLAN NOTE
She continues to report declining memory.  When we last addressed this issue in 2018 we obtained an MRI of the brain which showed some mild cerebral atrophy and microvascular ischemic changes.  She states that she has not had any changes in her speech, vision, numbness, tingling, weakness that would be consistent with a stroke recently.  However, she has trouble remembering to do things such as which items to  at the grocery store, to make appointments, and sometimes forgets where she is going while she is on her way.  She also has some trouble remembering specific words in the middle of telling the story.  She has all of her bills set up on automatic pay otherwise she thinks she would forget to pay them.  She orders all of her groceries from [facility] and has them delivered.  She reports occasionally leaving the stove on and has burned a couple of pans.  She denies getting lost while driving or going to familiar places but states that sometimes she forgets where she is supposed to go.  Reports that she has recently been approved for an in-home caregiver who will start at 11 hours and 45 minutes/week to help her with completing tasks around the home.

## 2023-02-02 NOTE — ASSESSMENT & PLAN NOTE
She reports that her vision has been steadily declining.  She wears contacts but has been needing to wear additional glasses on top of this.  States that she has followed up with [facility] but she would like to see a different optometrist because they have not adjusted her prescription.

## 2023-02-02 NOTE — ASSESSMENT & PLAN NOTE
She reports lack of energy however sleep quality is poor as discussed below.  She would like to have B12 injections but we discussed that this would only be indicated if her levels are low.

## 2023-02-02 NOTE — ASSESSMENT & PLAN NOTE
She is currently just taking supplemental calcium and vitamin D.  She is due for her DEXA scan later this year.

## 2023-02-02 NOTE — ASSESSMENT & PLAN NOTE
Reports following with urology and currently taking tolterodine.  This has helped significantly with urinary urgency.  Still needs to wear a panty liner for leakage.

## 2023-02-02 NOTE — ASSESSMENT & PLAN NOTE
She reports a mild amount of tan vaginal discharge for the past few months that comes and goes.  She has noticed it because she is wearing an incontinence pad.  She has not been sexually active for 15 years and considers herself a sexual.  She denies itching or vaginal bleeding.

## 2023-02-02 NOTE — PROGRESS NOTES
Subjective:   Kristen Carrera is a 72 y.o. female here today for memory loss, chronic issues below    Memory loss  She continues to report declining memory.  When we last addressed this issue in 2018 we obtained an MRI of the brain which showed some mild cerebral atrophy and microvascular ischemic changes.  She states that she has not had any changes in her speech, vision, numbness, tingling, weakness that would be consistent with a stroke recently.  However, she has trouble remembering to do things such as which items to  at the grocery store, to make appointments, and sometimes forgets where she is going while she is on her way.  She also has some trouble remembering specific words in the middle of telling the story.  She has all of her bills set up on automatic pay otherwise she thinks she would forget to pay them.  She orders all of her groceries from [employer] and has them delivered.  She reports occasionally leaving the stove on and has burned a couple of pans.  She denies getting lost while driving or going to familiar places but states that sometimes she forgets where she is supposed to go.  Reports that she has recently been approved for an in-home caregiver who will start at 11 hours and 45 minutes/week to help her with completing tasks around the home.      Urinary incontinence  Reports following with urology and currently taking tolterodine.  This has helped significantly with urinary urgency.  Still needs to wear a panty liner for leakage.     Vaginal discharge  She reports a mild amount of tan vaginal discharge for the past few months that comes and goes.  She has noticed it because she is wearing an incontinence pad.  She has not been sexually active for 15 years and considers herself a sexual.  She denies itching or vaginal bleeding.    Vision loss, bilateral  She reports that her vision has been steadily declining.  She wears contacts but has been needing to wear additional glasses on top of this.   States that she has followed up with [facility] but she would like to see a different optometrist because they have not adjusted her prescription.    H/O fracture of ankle  She is status post ankle repair with plating by Dr. Lucas.  She has follow-up with him later this month.  She has been able to feel a screw she thinks and she wants to have her hardware removed but she plans to discuss this with him at their next visit.    Osteopenia  She is currently just taking supplemental calcium and vitamin D.  She is due for her DEXA scan later this year.    Severe MAX (obstructive sleep apnea)  She had a sleep study done in July 2022 which showed severe MAX but she has not treated it yet.  She has had trouble making a follow-up appointment with her sleep provider but she plans to do so in the near future.  She does report increased fatigue and lack of energy.    Insomnia  She still reports some difficulty with sleep however she is now off of her Ambien.  She is requesting a new referral to psychiatry.    Hyperlipidemia  Last labs checked in April of last year showed mild hyperlipidemia. The 10-year ASCVD risk score (Lizeth DK, et al., 2019) is: 15.9%      History of hypothyroidism  Last labs checked in April 2022 showed TSH in the normal range.    Fatigue  She reports lack of energy however sleep quality is poor as discussed below.  She would like to have B12 injections but we discussed that this would only be indicated if her levels are low.    ADD (attention deficit disorder)  She continues to report difficulty with concentrating and completing tasks.  Was previously getting Adderall from psychiatry but is no longer seeing her psychiatrist and is requesting a new referral.    Elevated blood pressure reading  Does not currently check blood pressure at home.  Today in clinic she is at 154/100 which she attributes to walking here.  She does not currently take any medication for blood pressure control.       Current  medicines (including changes today)  Current Outpatient Medications   Medication Sig Dispense Refill    tolterodine ER (DETROL LA) 4 MG CAPSULE SR 24 HR       Calcium Carbonate-Vitamin D (CALCIUM-D PO) Take 2 Tablets by mouth every day.      acetaminophen (TYLENOL) 500 MG Tab 1 po tid 42 Tablet 0    ibuprofen (MOTRIN) 600 MG Tab 1 po tid 42 Tablet 0     No current facility-administered medications for this visit.     She  has a past medical history of ADD (attention deficit disorder), Anxiety, Arthritis, CTS (carpal tunnel syndrome), Depression, History of abdominoplasty (3/15/2012), Hyperlipidemia, Hypothyroidism, Midline low back pain without sciatica (8/4/2015), Osteoporosis (8/19/2014), and PATENT FORAMEN OVALE LEFT TO RIGHT SHUNT (12/8/2010).         Objective:     Vitals:    02/02/23 1308   BP: (!) 154/100, 138/98   Pulse: 90   Resp: 16   Temp: 36.6 °C (97.9 °F)   SpO2: 97%     Body mass index is 26.51 kg/m².   Physical Exam:  Constitutional: Alert, no distress.  Skin: Warm, dry, good turgor, no rashes in visible areas.  Eye: Equal, round and reactive, conjunctiva clear, lids normal.  ENMT: Lips without lesions, poor dentition, oropharynx clear, TM's clear bilaterally  Neck: Trachea midline, no masses, no thyromegaly. No cervical or supraclavicular lymphadenopathy  Respiratory: Unlabored respiratory effort, lungs clear to auscultation, no wheezes, no ronchi.  Cardiovascular: Regular rate and rhythm, no murmurs appreciated, no lower extremity edema  Abdomen: Soft, non-tender, no masses, no hepatosplenomegaly.  Psych: Alert and oriented x3, normal affect and mood; patient is able to tell and remember jokes during office visit and she can also remember details from the birth of her great grandson which was 5 years ago      Assessment and Plan:   The following treatment plan was discussed    1. Attention deficit disorder, unspecified hyperactivity presence  Currently off of Adderall.  Unclear if this is the best  treatment for her given her age and other comorbidities.  Have placed referral back to psychiatry for additional evaluation  - Referral to Psychiatry    2. Insomnia, unspecified type  Currently off of her Ambien which she was taking to combat the effects of the Adderall.  However, advised her to follow-up with her sleep medicine provider to get her CPAP and have referred to psychiatry at her request  - Referral to Psychiatry    3. Memory loss  Progressive per patient, new referral placed to neurology for evaluation  - Referral to Neurology    4. History of hypothyroidism  - TSH WITH REFLEX TO FT4; Future    5. Hyperlipidemia, unspecified hyperlipidemia type  - Lipid Profile; Future    6. Screen for colon cancer  - Referral to GI for Colonoscopy    7. Urge incontinence of urine  Stable, she will continue tolterodine daily    8. Osteopenia, unspecified location  -Due for DEXA scan in September, will order at next visit  - VITAMIN D,25 HYDROXY (DEFICIENCY); Future  -Continue supplemental vitamin D and calcium    9. Vision loss, bilateral  - Referral to Optometry    10. Other fatigue  Suspect strongly related to uncontrolled MAX.  Advised her to follow-up with sleep medicine but we will also obtain labs as below  - Comp Metabolic Panel; Future  - TSH WITH REFLEX TO FT4; Future  - CBC WITH DIFFERENTIAL; Future  - VITAMIN B12; Future    11. Screening for viral disease  - HCV Scrn ( [id number] 1xLife); Future    12. Vaginal discharge  Very unlikely to be STI given not sexually active.  Will test for yeast and BV but if normal then likely physiologic  - VAGINAL PATHOGENS DNA PANEL; Future    13. H/O fracture of ankle  Healing well.  She will follow-up with her orthopedist to discuss hardware removal when appropriate    14. Severe MAX (obstructive sleep apnea)  Uncontrolled.  Encouraged her to follow-up with her sleep medicine provider so that she can get started on CPAP therapy    15. Elevated blood pressure  We discussed  beginning home blood pressure monitoring.  Suspect that she will need to be started on an antihypertensive agent in the near future.      Followup: Return in about 3 months (around 5/2/2023).

## 2023-02-02 NOTE — ASSESSMENT & PLAN NOTE
Does not currently check blood pressure at home.  Today in clinic she is at 154/100 which she attributes to walking here.  She does not currently take any medication for blood pressure control.

## 2023-02-02 NOTE — ASSESSMENT & PLAN NOTE
She still reports some difficulty with sleep however she is now off of her Ambien.  She is requesting a new referral to psychiatry.

## 2023-02-02 NOTE — ASSESSMENT & PLAN NOTE
She had a sleep study done in July 2022 which showed severe MAX but she has not treated it yet.  She has had trouble making a follow-up appointment with her sleep provider but she plans to do so in the near future.  She does report increased fatigue and lack of energy.

## 2023-02-02 NOTE — ASSESSMENT & PLAN NOTE
She is status post ankle repair with plating by Dr. Lucas.  She has follow-up with him later this month.  She has been able to feel a screw she thinks and she wants to have her hardware removed but she plans to discuss this with him at their next visit.

## 2023-02-02 NOTE — ASSESSMENT & PLAN NOTE
She continues to report difficulty with concentrating and completing tasks.  Was previously getting Adderall from psychiatry but is no longer seeing her psychiatrist and is requesting a new referral.

## 2023-02-02 NOTE — ASSESSMENT & PLAN NOTE
Last labs checked in April of last year showed mild hyperlipidemia. The 10-year ASCVD risk score (Lizeth RAMIREZ, et al., 2019) is: 15.9%

## 2023-02-03 LAB
CANDIDA DNA VAG QL PROBE+SIG AMP: NEGATIVE
G VAGINALIS DNA VAG QL PROBE+SIG AMP: NEGATIVE
T VAGINALIS DNA VAG QL PROBE+SIG AMP: NEGATIVE

## 2023-02-08 ENCOUNTER — TELEPHONE (OUTPATIENT)
Dept: HEALTH INFORMATION MANAGEMENT | Facility: OTHER | Age: 73
End: 2023-02-08
Payer: MEDICARE

## 2023-02-22 ENCOUNTER — HOSPITAL ENCOUNTER (OUTPATIENT)
Dept: LAB | Facility: MEDICAL CENTER | Age: 73
End: 2023-02-22
Attending: INTERNAL MEDICINE
Payer: MEDICARE

## 2023-02-22 DIAGNOSIS — E78.5 HYPERLIPIDEMIA, UNSPECIFIED HYPERLIPIDEMIA TYPE: ICD-10-CM

## 2023-02-22 DIAGNOSIS — Z11.59 SCREENING FOR VIRAL DISEASE: ICD-10-CM

## 2023-02-22 DIAGNOSIS — R53.83 OTHER FATIGUE: ICD-10-CM

## 2023-02-22 DIAGNOSIS — M85.80 OSTEOPENIA, UNSPECIFIED LOCATION: ICD-10-CM

## 2023-02-22 LAB
25(OH)D3 SERPL-MCNC: 42 NG/ML (ref 30–100)
ALBUMIN SERPL BCP-MCNC: 4.5 G/DL (ref 3.2–4.9)
ALBUMIN/GLOB SERPL: 1.6 G/DL
ALP SERPL-CCNC: 50 U/L (ref 30–99)
ALT SERPL-CCNC: 10 U/L (ref 2–50)
ANION GAP SERPL CALC-SCNC: 10 MMOL/L (ref 7–16)
AST SERPL-CCNC: 22 U/L (ref 12–45)
BASOPHILS # BLD AUTO: 0.5 % (ref 0–1.8)
BASOPHILS # BLD: 0.04 K/UL (ref 0–0.12)
BILIRUB SERPL-MCNC: 0.9 MG/DL (ref 0.1–1.5)
BUN SERPL-MCNC: 20 MG/DL (ref 8–22)
CALCIUM ALBUM COR SERPL-MCNC: 9.4 MG/DL (ref 8.5–10.5)
CALCIUM SERPL-MCNC: 9.8 MG/DL (ref 8.5–10.5)
CHLORIDE SERPL-SCNC: 108 MMOL/L (ref 96–112)
CHOLEST SERPL-MCNC: 208 MG/DL (ref 100–199)
CO2 SERPL-SCNC: 23 MMOL/L (ref 20–33)
CREAT SERPL-MCNC: 0.75 MG/DL (ref 0.5–1.4)
EOSINOPHIL # BLD AUTO: 0.01 K/UL (ref 0–0.51)
EOSINOPHIL NFR BLD: 0.1 % (ref 0–6.9)
ERYTHROCYTE [DISTWIDTH] IN BLOOD BY AUTOMATED COUNT: 47.7 FL (ref 35.9–50)
GFR SERPLBLD CREATININE-BSD FMLA CKD-EPI: 84 ML/MIN/1.73 M 2
GLOBULIN SER CALC-MCNC: 2.9 G/DL (ref 1.9–3.5)
GLUCOSE SERPL-MCNC: 109 MG/DL (ref 65–99)
HCT VFR BLD AUTO: 41 % (ref 37–47)
HCV AB SER QL: NORMAL
HDLC SERPL-MCNC: 86 MG/DL
HGB BLD-MCNC: 13.6 G/DL (ref 12–16)
IMM GRANULOCYTES # BLD AUTO: 0.04 K/UL (ref 0–0.11)
IMM GRANULOCYTES NFR BLD AUTO: 0.5 % (ref 0–0.9)
LDLC SERPL CALC-MCNC: 106 MG/DL
LYMPHOCYTES # BLD AUTO: 1.23 K/UL (ref 1–4.8)
LYMPHOCYTES NFR BLD: 15.3 % (ref 22–41)
MCH RBC QN AUTO: 30.9 PG (ref 27–33)
MCHC RBC AUTO-ENTMCNC: 33.2 G/DL (ref 33.6–35)
MCV RBC AUTO: 93.2 FL (ref 81.4–97.8)
MONOCYTES # BLD AUTO: 0.65 K/UL (ref 0–0.85)
MONOCYTES NFR BLD AUTO: 8.1 % (ref 0–13.4)
NEUTROPHILS # BLD AUTO: 6.07 K/UL (ref 2–7.15)
NEUTROPHILS NFR BLD: 75.5 % (ref 44–72)
NRBC # BLD AUTO: 0 K/UL
NRBC BLD-RTO: 0 /100 WBC
PLATELET # BLD AUTO: 249 K/UL (ref 164–446)
PMV BLD AUTO: 10.8 FL (ref 9–12.9)
POTASSIUM SERPL-SCNC: 3.6 MMOL/L (ref 3.6–5.5)
PROT SERPL-MCNC: 7.4 G/DL (ref 6–8.2)
RBC # BLD AUTO: 4.4 M/UL (ref 4.2–5.4)
SODIUM SERPL-SCNC: 141 MMOL/L (ref 135–145)
TRIGL SERPL-MCNC: 80 MG/DL (ref 0–149)
TSH SERPL DL<=0.005 MIU/L-ACNC: 1.18 UIU/ML (ref 0.38–5.33)
VIT B12 SERPL-MCNC: 373 PG/ML (ref 211–911)
WBC # BLD AUTO: 8 K/UL (ref 4.8–10.8)

## 2023-02-22 PROCEDURE — 80053 COMPREHEN METABOLIC PANEL: CPT

## 2023-02-22 PROCEDURE — 82607 VITAMIN B-12: CPT

## 2023-02-22 PROCEDURE — G0472 HEP C SCREEN HIGH RISK/OTHER: HCPCS

## 2023-02-22 PROCEDURE — 80061 LIPID PANEL: CPT

## 2023-02-22 PROCEDURE — 36415 COLL VENOUS BLD VENIPUNCTURE: CPT

## 2023-02-22 PROCEDURE — 82306 VITAMIN D 25 HYDROXY: CPT

## 2023-02-22 PROCEDURE — 84443 ASSAY THYROID STIM HORMONE: CPT

## 2023-02-22 PROCEDURE — 85025 COMPLETE CBC W/AUTO DIFF WBC: CPT

## 2023-04-09 ENCOUNTER — SLEEP STUDY (OUTPATIENT)
Dept: SLEEP MEDICINE | Facility: MEDICAL CENTER | Age: 73
End: 2023-04-09
Attending: NURSE PRACTITIONER
Payer: MEDICARE

## 2023-04-09 DIAGNOSIS — G47.33 OBSTRUCTIVE SLEEP APNEA: ICD-10-CM

## 2023-04-09 PROCEDURE — 95811 POLYSOM 6/>YRS CPAP 4/> PARM: CPT | Performed by: STUDENT IN AN ORGANIZED HEALTH CARE EDUCATION/TRAINING PROGRAM

## 2023-04-10 NOTE — PROCEDURES
MONTAGE: Standard  STUDY TYPE: Treatment  RECORDING TECHNIQUE:   After the scalp was prepared, gold plated electrodes were applied to the scalp according to the International 10-20 System. EEG (electroencephalogram) was continuously monitored from the O1-M2, O2-M1, C3-M2, C4-M1, F3-M2, and F4-M1. EOGs (electrooculograms) were monitored by electrodes placed at the left and right outer canthi. Chin EMG (electromyogram) was monitored by electrodes placed on the mentalis and sub-mentalis muscles. Nasal and oral airflow were monitored using a triple port thermocouple as well as oronasal pressure transducer. Respiratory effort was measured by inductive plethysmography technology employing abdominal and thoracic belts. Blood oxygen saturation and pulse were monitored by pulse oximetry. Heart rhythm was monitored by surface electrocardiogram. Leg EMG was studied using surface electrodes placed on left and right anterior tibialis. A microphone was used to monitor tracheal sounds and snoring. Body position was monitored and documented by technician observation.   SCORING CRITERIA:   A modification of the AASM manual for scoring of sleep and associated events was used. Obstructive apneas were scored by cessation of airflow for at least 10 seconds with continuing respiratory effort. Central apneas were scored by cessation of airflow for at least 10 seconds with no respiratory effort. Hypopneas were scored by a 30% or more reduction in airflow for at least 10 seconds accompanied by arterial oxygen desaturation of 3% or an arousal. For CMS (Medicare) patients, per AASM rule 1B, hypopneas are scored by 30% with mild reduction in airflow for at least 10 seconds accompanied by arterial saturation decreased at 4%.    Study start time was 10:04:04 PM. Diagnostic recording time was 6h 58.0m with a total sleep time of 4h 53.5m resulting in a sleep efficiency of 70.22%%. Sleep latency from the start of the study was 17 minutes and the  latency from sleep to REM was 141 minutes. In total,144 arousals were scored for an arousal index of 29.4.  Respiratory:  There were a total of 54 apneas consisting of 31 obstructive apneas, 0 mixed apneas, and 23 central apneas. A total of 31 hypopneas were scored. The apnea index was 11.04 per hour and the hypopnea index was 6.34 per hour resulting in an overall AHI of 17.38. AHI during rem was 4.4 and AHI while supine was 47.65.  Oximetry:  There was a mean oxygen saturation of 95.0%. The minimum oxygen saturation in NREM was 87.0 % and in REM was 88.0%. The patient spent 0.5 minutes of TST with SaO2 <88%.  Cardiac:  The highest heart rate seen while awake was 99 BPM while the highest heart rate during sleep was 93 BPM with an average sleeping heart rate of 73 BPM.  Limb Movements:  There were a total of 5 PLMs during sleep which resulted in a PLMS index of 1.0. Of these, 9 were associated with arousals which resulted in a PLMS arousal index of 1.8.  Titration: CPAP was tried from 5-9cm H2O. Bipap was tried from 10/6-13/7cm H2O.  This was a fully attended sleep study. This test was technically adequate. This patient was titrated on CPAP starting at 5 cm of water pressure. Patient was titrated up to BiPAP 13/7 cm of water pressure. Patient did best at BiPAP 12/7 cm of water pressure. Patient spent 62 minutes at that pressure and their AHI was 2.9 which is considered treated obstructive sleep apnea.     Impression:  1.  Patient was tried on CPAP and BiPAP during night of titration study  2.  Patient appeared to do best with BiPAP therapy  3.  Supine REM sleep was seen on CPAP  4.  Respiratory events improved with higher levels of PAP therapy    Recommendations:  I recommend auto BiPAP EPAP min 6 IPAP max 12 pressure support 4cm .  Patient used a small AirFit F30i mask during night of study.     I also recommend 30 day compliance download to assess the efficacy to the recommended pressure, measure leak, apnea  hypopnea index and compliance for further outpatient monitoring and management of PAP therapy. In some cases alternative treatment options may be proven effective in resolving sleep apnea. These options include upper airway surgery, the use of a dental orthotic, weight loss, or positional therapy. Clinical correlation is required. In general patients with sleep apnea are advised to avoid alcohol, sedatives and not to operate a motor vehicle while drowsy.  Untreated sleep apnea increases the risk for cardiovascular and neurovascular disease.

## 2023-04-24 ENCOUNTER — TELEPHONE (OUTPATIENT)
Dept: SLEEP MEDICINE | Facility: MEDICAL CENTER | Age: 73
End: 2023-04-24
Payer: MEDICARE

## 2023-04-24 NOTE — TELEPHONE ENCOUNTER
04-24-23  2nd NO SHOW  Date of No Show: 04-21-23  Provider: CLARITA Greenfield  Reason For Visit: (Fv/ SS results)    2 no shows, routed to     JOSR

## 2023-05-04 ENCOUNTER — OFFICE VISIT (OUTPATIENT)
Dept: MEDICAL GROUP | Facility: MEDICAL CENTER | Age: 73
End: 2023-05-04
Attending: INTERNAL MEDICINE
Payer: MEDICARE

## 2023-05-04 VITALS
TEMPERATURE: 97.7 F | HEIGHT: 62 IN | DIASTOLIC BLOOD PRESSURE: 100 MMHG | OXYGEN SATURATION: 96 % | HEART RATE: 100 BPM | SYSTOLIC BLOOD PRESSURE: 160 MMHG | WEIGHT: 147 LBS | BODY MASS INDEX: 27.05 KG/M2 | RESPIRATION RATE: 20 BRPM

## 2023-05-04 DIAGNOSIS — I10 PRIMARY HYPERTENSION: ICD-10-CM

## 2023-05-04 DIAGNOSIS — G47.33 OSA (OBSTRUCTIVE SLEEP APNEA): ICD-10-CM

## 2023-05-04 PROBLEM — N89.8 VAGINAL DISCHARGE: Status: RESOLVED | Noted: 2023-02-02 | Resolved: 2023-05-04

## 2023-05-04 PROCEDURE — 99213 OFFICE O/P EST LOW 20 MIN: CPT | Performed by: INTERNAL MEDICINE

## 2023-05-04 PROCEDURE — 99214 OFFICE O/P EST MOD 30 MIN: CPT | Performed by: INTERNAL MEDICINE

## 2023-05-04 RX ORDER — DEXTROAMPHETAMINE SACCHARATE, AMPHETAMINE ASPARTATE, DEXTROAMPHETAMINE SULFATE AND AMPHETAMINE SULFATE 2.5; 2.5; 2.5; 2.5 MG/1; MG/1; MG/1; MG/1
10 TABLET ORAL 2 TIMES DAILY
COMMUNITY
Start: 2023-03-25 | End: 2023-09-13 | Stop reason: SDUPTHER

## 2023-05-04 RX ORDER — LISINOPRIL 10 MG/1
10 TABLET ORAL DAILY
Qty: 30 TABLET | Refills: 2 | Status: SHIPPED | OUTPATIENT
Start: 2023-05-04 | End: 2023-06-01

## 2023-05-04 RX ORDER — TRIAMCINOLONE ACETONIDE 40 MG/ML
INJECTION, SUSPENSION INTRA-ARTICULAR; INTRAMUSCULAR
COMMUNITY
Start: 2023-02-21 | End: 2023-06-01

## 2023-05-04 ASSESSMENT — FIBROSIS 4 INDEX: FIB4 SCORE: 2.01

## 2023-05-04 NOTE — PROGRESS NOTES
Subjective:   CONCHITA CHAVEZ is a 72 y.o. female here today for f/u labs, HTN, sleep study    Primary hypertension  Blood pressure has been fairly elevated on 2 consecutive visits.  She is not checking at home but she is able to get a blood pressure cuff to start monitoring.  She is amenable to starting medication today.    Severe MXA (obstructive sleep apnea)  She recently followed up with sleep medicine and had an updated sleep study which continues to show severe MAX.  She missed her follow-up appointment to discuss starting BiPAP therapy but she states that while she was wearing this during her sleep study it was very uncomfortable for her.  She continues to report very severe fatigue.  At one point in time she was seeing Dr. Pham who diagnosed her with hypersomnolence however unclear if she actually had a sleep study at the time of this diagnosis.  For many years he was treating her with Adderall and Ambien but her care has transitions to different providers who have ultimately felt this regimen to be inappropriate.  She has been gradually weaned down on her Adderall with her last prescription from 3/25/2023 but reports that that provider will no longer prescribe it to her.  She states that she has been saving her doses and she still has a few left.       Current medicines (including changes today)  Current Outpatient Medications   Medication Sig Dispense Refill    amphetamine-dextroamphetamine (ADDERALL) 10 MG Tab Take 10 mg by mouth 2 times a day.      triamcinolone acetonide (KENALOG-40) 40 MG/ML Suspension       lisinopril (PRINIVIL) 10 MG Tab Take 1 Tablet by mouth every day. 30 Tablet 2    mirtazapine (REMERON) 15 MG Tab       tolterodine ER (DETROL LA) 4 MG CAPSULE SR 24 HR       Calcium Carbonate-Vitamin D (CALCIUM-D PO) Take 2 Tablets by mouth every day.      acetaminophen (TYLENOL) 500 MG Tab 1 po tid 42 Tablet 0    ibuprofen (MOTRIN) 600 MG Tab 1 po tid 42 Tablet 0     No current facility-administered  medications for this visit.     She  has a past medical history of ADD (attention deficit disorder), Anxiety, Arthritis, CTS (carpal tunnel syndrome), Depression, History of abdominoplasty (3/15/2012), Hyperlipidemia, Hypothyroidism, Midline low back pain without sciatica (8/4/2015), Osteoporosis (8/19/2014), and PATENT FORAMEN OVALE LEFT TO RIGHT SHUNT (12/8/2010).         Objective:     Vitals:    05/04/23 1330   BP: (!) 160/100   Pulse: 100   Resp: 20   Temp: 36.5 °C (97.7 °F)   SpO2: 96%     Body mass index is 26.88 kg/m².   Physical Exam:  Constitutional: Alert, no distress.  Skin: Warm, dry, good turgor, no rashes in visible areas.  Eye: Equal, round and reactive, conjunctiva clear, lids normal.  Psych: Alert and oriented x3, normal affect and mood.      Results and Imaging:   Hospital Outpatient Visit on 02/22/2023   Component Date Value Ref Range Status    Vitamin B12 -True Cobalamin 02/22/2023 373  211 - 911 pg/mL Final    Cholesterol,Tot 02/22/2023 208 (H)  100 - 199 mg/dL Final    Triglycerides 02/22/2023 80  0 - 149 mg/dL Final    HDL 02/22/2023 86  >=40 mg/dL Final    LDL 02/22/2023 106 (H)  <100 mg/dL Final    25-Hydroxy   Vitamin D 25 02/22/2023 42  30 - 100 ng/mL Final    Comment: Adult Ranges:   <20 ng/mL - Deficiency  20-29 ng/mL - Insufficiency   ng/mL - Sufficiency  Electrochemiluminescence binding assay performed using Roche mariela e  immunoassay analyzer.  The Elecsys Vitamin D total II assay is intended for  the quantitative determination of total 25 hydroxyvitamin D in human serum  and plasma. This assay is to be used as an aid in the assessment of vitamin  D sufficiency in adults.      WBC 02/22/2023 8.0  4.8 - 10.8 K/uL Final    RBC 02/22/2023 4.40  4.20 - 5.40 M/uL Final    Hemoglobin 02/22/2023 13.6  12.0 - 16.0 g/dL Final    Hematocrit 02/22/2023 41.0  37.0 - 47.0 % Final    MCV 02/22/2023 93.2  81.4 - 97.8 fL Final    MCH 02/22/2023 30.9  27.0 - 33.0 pg Final    MCHC 02/22/2023  33.2 (L)  33.6 - 35.0 g/dL Final    RDW 02/22/2023 47.7  35.9 - 50.0 fL Final    Platelet Count 02/22/2023 249  164 - 446 K/uL Final    MPV 02/22/2023 10.8  9.0 - 12.9 fL Final    Neutrophils-Polys 02/22/2023 75.50 (H)  44.00 - 72.00 % Final    Lymphocytes 02/22/2023 15.30 (L)  22.00 - 41.00 % Final    Monocytes 02/22/2023 8.10  0.00 - 13.40 % Final    Eosinophils 02/22/2023 0.10  0.00 - 6.90 % Final    Basophils 02/22/2023 0.50  0.00 - 1.80 % Final    Immature Granulocytes 02/22/2023 0.50  0.00 - 0.90 % Final    Nucleated RBC 02/22/2023 0.00  /100 WBC Final    Neutrophils (Absolute) 02/22/2023 6.07  2.00 - 7.15 K/uL Final    Includes immature neutrophils, if present.    Lymphs (Absolute) 02/22/2023 1.23  1.00 - 4.80 K/uL Final    Monos (Absolute) 02/22/2023 0.65  0.00 - 0.85 K/uL Final    Eos (Absolute) 02/22/2023 0.01  0.00 - 0.51 K/uL Final    Baso (Absolute) 02/22/2023 0.04  0.00 - 0.12 K/uL Final    Immature Granulocytes (abs) 02/22/2023 0.04  0.00 - 0.11 K/uL Final    NRBC (Absolute) 02/22/2023 0.00  K/uL Final    Hepatitis C Antibody 02/22/2023 Non-Reactive  Non-Reactive Final    Comment: Antibodies to HCV were not detected.  The Roche anti-HCV is a diagnostic test for the qualitative determination of  antibodies to the hepatitis C virus in human serum and plasma. The results  should be used and interpreted only in the context of the overall clinical  picture. A negative test result does not exclude the possibility of exposure  to hepatitis C virus.  Note: Assay performance characteristics have not been established in  populations of immunocompromised or immunosuppressed patients.      Sodium 02/22/2023 141  135 - 145 mmol/L Final    Potassium 02/22/2023 3.6  3.6 - 5.5 mmol/L Final    Chloride 02/22/2023 108  96 - 112 mmol/L Final    Co2 02/22/2023 23  20 - 33 mmol/L Final    Anion Gap 02/22/2023 10.0  7.0 - 16.0 Final    Glucose 02/22/2023 109 (H)  65 - 99 mg/dL Final    Bun 02/22/2023 20  8 - 22 mg/dL Final     Creatinine 02/22/2023 0.75  0.50 - 1.40 mg/dL Final    Calcium 02/22/2023 9.8  8.5 - 10.5 mg/dL Final    AST(SGOT) 02/22/2023 22  12 - 45 U/L Final    ALT(SGPT) 02/22/2023 10  2 - 50 U/L Final    Alkaline Phosphatase 02/22/2023 50  30 - 99 U/L Final    Total Bilirubin 02/22/2023 0.9  0.1 - 1.5 mg/dL Final    Albumin 02/22/2023 4.5  3.2 - 4.9 g/dL Final    Total Protein 02/22/2023 7.4  6.0 - 8.2 g/dL Final    Globulin 02/22/2023 2.9  1.9 - 3.5 g/dL Final    A-G Ratio 02/22/2023 1.6  g/dL Final    TSH 02/22/2023 1.180  0.380 - 5.330 uIU/mL Final    Comment: The 2011 American Thyroid Association (JEY) guidelines  recommended that the interpretation of thyroid function in  pregnancy be based on trimester specific reference ranges.    1st Trimester  0.100-2.500 mIU/L  2nd Trimester  0.200-3.000 mIU/L  3rd Trimester  0.300-3.500 mIU/L    These established reference ranges have not been validated  at [facility].      Correct Calcium 02/22/2023 9.4  8.5 - 10.5 mg/dL Final    GFR (CKD-EPI) 02/22/2023 84  >60 mL/min/1.73 m 2 Final    Comment: Estimated Glomerular Filtration Rate is calculated using  race neutral CKD-EPI 2021 equation per NKF-ASN recommendations.           Assessment and Plan:   The following treatment plan was discussed    1. Primary hypertension  Uncontrolled however we do not have much data about how high she is getting.  With her age and never having been on any medication before we will start out low with her first agent but plan to uptitrate and potentially add a second agent.  She will get a blood pressure cuff this week and we will see her back in 3 to 4 weeks for follow-up  - lisinopril (PRINIVIL) 10 MG Tab; Take 1 Tablet by mouth every day.  Dispense: 30 Tablet; Refill: 2    2. Severe MAX (obstructive sleep apnea)  We discussed that this is likely the root if not a significant contributor to her fatigue and that appropriate treatment will certainly help.  Question whether symptoms  have been masked with her Adderall for many years.  Discussed that it can be difficult to tolerate the BiPAP but it is important to adjust to it if possible.  She has a follow-up with pulmonology in June to hopefully get therapy initiated.        Followup: Return in about 4 weeks (around 6/1/2023) for hypertension.

## 2023-05-04 NOTE — ASSESSMENT & PLAN NOTE
She recently followed up with sleep medicine and had an updated sleep study which continues to show severe MAX.  She missed her follow-up appointment to discuss starting BiPAP therapy but she states that while she was wearing this during her sleep study it was very uncomfortable for her.  She continues to report very severe fatigue.  At one point in time she was seeing Dr. Pham who diagnosed her with hypersomnolence however unclear if she actually had a sleep study at the time of this diagnosis.  For many years he was treating her with Adderall and Ambien but her care has transitions to different providers who have ultimately felt this regimen to be inappropriate.  She has been gradually weaned down on her Adderall with her last prescription from 3/25/2023 but reports that that provider will no longer prescribe it to her.  She states that she has been saving her doses and she still has a few left.

## 2023-05-04 NOTE — ASSESSMENT & PLAN NOTE
Blood pressure has been fairly elevated on 2 consecutive visits.  She is not checking at home but she is able to get a blood pressure cuff to start monitoring.  She is amenable to starting medication today.

## 2023-06-01 ENCOUNTER — TELEMEDICINE (OUTPATIENT)
Dept: BEHAVIORAL HEALTH | Facility: CLINIC | Age: 73
End: 2023-06-01
Payer: MEDICARE

## 2023-06-01 ENCOUNTER — OFFICE VISIT (OUTPATIENT)
Dept: MEDICAL GROUP | Facility: MEDICAL CENTER | Age: 73
End: 2023-06-01
Attending: INTERNAL MEDICINE
Payer: MEDICARE

## 2023-06-01 VITALS
TEMPERATURE: 97.8 F | SYSTOLIC BLOOD PRESSURE: 110 MMHG | DIASTOLIC BLOOD PRESSURE: 60 MMHG | WEIGHT: 145 LBS | HEIGHT: 62 IN | RESPIRATION RATE: 16 BRPM | HEART RATE: 86 BPM | OXYGEN SATURATION: 96 % | BODY MASS INDEX: 26.68 KG/M2

## 2023-06-01 DIAGNOSIS — I10 PRIMARY HYPERTENSION: ICD-10-CM

## 2023-06-01 DIAGNOSIS — F33.2 SEVERE EPISODE OF RECURRENT MAJOR DEPRESSIVE DISORDER, WITHOUT PSYCHOTIC FEATURES (HCC): ICD-10-CM

## 2023-06-01 DIAGNOSIS — R41.3 MEMORY LOSS: ICD-10-CM

## 2023-06-01 DIAGNOSIS — G47.10 HYPERSOMNIA: ICD-10-CM

## 2023-06-01 DIAGNOSIS — G47.33 OSA (OBSTRUCTIVE SLEEP APNEA): ICD-10-CM

## 2023-06-01 PROCEDURE — 99213 OFFICE O/P EST LOW 20 MIN: CPT | Performed by: INTERNAL MEDICINE

## 2023-06-01 PROCEDURE — 99214 OFFICE O/P EST MOD 30 MIN: CPT | Mod: 95 | Performed by: PSYCHIATRY & NEUROLOGY

## 2023-06-01 PROCEDURE — 3078F DIAST BP <80 MM HG: CPT | Performed by: INTERNAL MEDICINE

## 2023-06-01 PROCEDURE — 90833 PSYTX W PT W E/M 30 MIN: CPT | Mod: 95 | Performed by: PSYCHIATRY & NEUROLOGY

## 2023-06-01 PROCEDURE — 99212 OFFICE O/P EST SF 10 MIN: CPT | Performed by: INTERNAL MEDICINE

## 2023-06-01 PROCEDURE — 3074F SYST BP LT 130 MM HG: CPT | Performed by: INTERNAL MEDICINE

## 2023-06-01 RX ORDER — TRAZODONE HYDROCHLORIDE 100 MG/1
TABLET ORAL
COMMUNITY
Start: 2023-05-08 | End: 2023-07-03

## 2023-06-01 RX ORDER — SERTRALINE HYDROCHLORIDE 25 MG/1
25 TABLET, FILM COATED ORAL
COMMUNITY
Start: 2023-05-08 | End: 2023-10-11

## 2023-06-01 RX ORDER — LISINOPRIL 10 MG/1
10 TABLET ORAL DAILY
Qty: 90 TABLET | Refills: 2 | Status: SHIPPED | OUTPATIENT
Start: 2023-06-01 | End: 2023-12-14 | Stop reason: DRUGHIGH

## 2023-06-01 ASSESSMENT — FIBROSIS 4 INDEX: FIB4 SCORE: 2.01

## 2023-06-01 NOTE — PROGRESS NOTES
Subjective:   CONCHITA CHAVEZ is a 72 y.o. female here today for f/u HTN    Primary hypertension  She presents today for follow-up hypertension.  At her last visit we started her on lisinopril 10 mg daily.  She has been taking this regularly without side effects.  She has not been able to get a blood pressure cuff yet for home monitoring.  At her last visit she was at 160/100 and in clinic today she is at 110/60.       Current medicines (including changes today)  Current Outpatient Medications   Medication Sig Dispense Refill    traZODone (DESYREL) 100 MG Tab TAKE 0.5 TO 1 TABLET EVERY NIGHT AT BEDTIME AS NEEDED FOR INSOMNIA      sertraline (ZOLOFT) 25 MG tablet Take 25 mg by mouth every day.      MAGNESIUM PO       Potassium Gluconate 595 MG Cap       lisinopril (PRINIVIL) 10 MG Tab Take 1 Tablet by mouth every day. 90 Tablet 2    amphetamine-dextroamphetamine (ADDERALL) 10 MG Tab Take 10 mg by mouth 2 times a day.      tolterodine ER (DETROL LA) 4 MG CAPSULE SR 24 HR       Calcium Carbonate-Vitamin D (CALCIUM-D PO) Take 2 Tablets by mouth every day.      acetaminophen (TYLENOL) 500 MG Tab 1 po tid 42 Tablet 0    ibuprofen (MOTRIN) 600 MG Tab 1 po tid 42 Tablet 0     No current facility-administered medications for this visit.     She  has a past medical history of ADD (attention deficit disorder), Anxiety, Arthritis, CTS (carpal tunnel syndrome), Depression, History of abdominoplasty (3/15/2012), Hyperlipidemia, Hypothyroidism, Midline low back pain without sciatica (8/4/2015), Osteoporosis (8/19/2014), and PATENT FORAMEN OVALE LEFT TO RIGHT SHUNT (12/8/2010).         Objective:     Vitals:    06/01/23 1420   BP: 110/60   Pulse: 86   Resp: 16   Temp: 36.6 °C (97.8 °F)   SpO2: 96%     Body mass index is 26.52 kg/m².   Physical Exam:  Constitutional: Alert, no distress.  Skin: Warm, dry, good turgor, no rashes in visible areas.  Eye: Equal, round and reactive, conjunctiva clear,  Psych: Alert and oriented x3, normal  affect and mood.      Assessment and Plan:   The following treatment plan was discussed    1. Primary hypertension  Significant improvement with initiation of low-dose lisinopril.  We discussed continuing with current dose.  Reemphasized importance of home blood pressure monitoring and she will purchase a cuff.  Instructed her to notify us if she starts to have consistent readings greater than 140/90.  - lisinopril (PRINIVIL) 10 MG Tab; Take 1 Tablet by mouth every day.  Dispense: 90 Tablet; Refill: 2        Followup: Return in about 6 months (around 12/1/2023), or if symptoms worsen or fail to improve.
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
## 2023-06-01 NOTE — ASSESSMENT & PLAN NOTE
She presents today for follow-up hypertension.  At her last visit we started her on lisinopril 10 mg daily.  She has been taking this regularly without side effects.  She has not been able to get a blood pressure cuff yet for home monitoring.  At her last visit she was at 160/100 and in clinic today she is at 110/60.

## 2023-06-06 ENCOUNTER — HOSPITAL ENCOUNTER (OUTPATIENT)
Dept: BEHAVIORAL HEALTH | Facility: MEDICAL CENTER | Age: 73
End: 2023-06-06
Attending: PSYCHIATRY & NEUROLOGY
Payer: MEDICARE

## 2023-06-06 DIAGNOSIS — G47.09 OTHER INSOMNIA: ICD-10-CM

## 2023-06-06 DIAGNOSIS — F33.2 SEVERE EPISODE OF RECURRENT MAJOR DEPRESSIVE DISORDER, WITHOUT PSYCHOTIC FEATURES (HCC): ICD-10-CM

## 2023-06-06 DIAGNOSIS — F33.1 MODERATE EPISODE OF RECURRENT MAJOR DEPRESSIVE DISORDER (HCC): ICD-10-CM

## 2023-06-06 PROCEDURE — 90791 PSYCH DIAGNOSTIC EVALUATION: CPT
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
## 2023-06-12 ENCOUNTER — OFFICE VISIT (OUTPATIENT)
Dept: SLEEP MEDICINE | Facility: MEDICAL CENTER | Age: 73
End: 2023-06-12
Attending: PHYSICIAN ASSISTANT
Payer: MEDICARE

## 2023-06-12 VITALS
OXYGEN SATURATION: 96 % | RESPIRATION RATE: 16 BRPM | HEART RATE: 100 BPM | BODY MASS INDEX: 26.31 KG/M2 | SYSTOLIC BLOOD PRESSURE: 110 MMHG | HEIGHT: 62 IN | DIASTOLIC BLOOD PRESSURE: 70 MMHG | WEIGHT: 143 LBS

## 2023-06-12 DIAGNOSIS — G47.10 HYPERSOMNIA: ICD-10-CM

## 2023-06-12 DIAGNOSIS — G47.33 OSA (OBSTRUCTIVE SLEEP APNEA): ICD-10-CM

## 2023-06-12 PROCEDURE — 3078F DIAST BP <80 MM HG: CPT | Performed by: PHYSICIAN ASSISTANT

## 2023-06-12 PROCEDURE — 99213 OFFICE O/P EST LOW 20 MIN: CPT | Performed by: PHYSICIAN ASSISTANT

## 2023-06-12 PROCEDURE — 3074F SYST BP LT 130 MM HG: CPT | Performed by: PHYSICIAN ASSISTANT

## 2023-06-12 ASSESSMENT — ENCOUNTER SYMPTOMS
HEARTBURN: 0
PALPITATIONS: 0
DIZZINESS: 0
WHEEZING: 0
FEVER: 0
SPUTUM PRODUCTION: 0
SINUS PAIN: 0
HEADACHES: 1
SHORTNESS OF BREATH: 0
INSOMNIA: 1
COUGH: 0
WEIGHT LOSS: 0
CHILLS: 0
SORE THROAT: 0
TREMORS: 0
ORTHOPNEA: 0

## 2023-06-12 ASSESSMENT — FIBROSIS 4 INDEX: FIB4 SCORE: 2.01

## 2023-06-12 NOTE — PROGRESS NOTES
Chief Complaint   Patient presents with    Follow-Up     04/09/23 lulu    Results     SS       HPI:  CONCHITA CHAVEZ is a 72 y.o. year old female here today for follow-up on severe obstructive sleep apnea and sleep study results.  Patient last seen in clinic 9/6/2022 by TERRA Stearns.  Previously seen in clinic by Dr. Myers.  Patient is a never smoker.    Patient with pertinent past medical history of attention deficit, hypersomnolence, insomnia, hypertension, mild memory loss, history of hypothyroidism, hyperlipidemia, PFO txed/followed by cardiology, moderate recurrent depressive disorder.  Patient is on Adderall managed by behavioral health specialist for her hypersomnolence/ADD.  Patient also reports using Ambien which helps make her sleep patterns more regular also per behavioral health.  Patient has had sleepwalking while taking Ambien. Patient did experience a fall trying to get into her son's pickup requiring surgery right lower extremity.  She did previously trial CPAP without success.    Reviewed vital signs including /70, , O2 sat of 96% on room air BMI of 26.16 kg/m².      No recent chest imaging, chest x-ray 12/19/2019 demonstrated possible borderline mild diffuse interstitial prominence.  No cardiomegaly.    Echocardiogram obtained in 2013 demonstrated normal left ventricular chamber size, wall thickness and systolic function, LVEF estimated 60 to 65%.  Grade 1 diastolic dysfunction.  Mildly enlarged right ventricular size, mildly dilated left atrium, trace mitral regurgitation, mild tricuspid regurgitation with RVSP estimated at 33 mmHg.    Polysomnogram titration obtained 4/18/2023 demonstrated overall AHI of 17.38 increasing to 47.65 during REM sleep.  Low O2 sat of 87% with less than 1 minute spent less than or equal to 88%.  Patient was initially titrated on CPAP and transition to BiPAP with recommendation for BiPAP 12/6, pressure support of 4.  Utilize small AirFit F30 I mask  during study.    Per Dr. Myers's notes she underwent polysomnogram and MSLT testing at outside facility with Dr. Pham. PSG 10/20/2011 showed overall AHI of 4.2.  Home sleep testing via apnea link 8/21/2011 showed overall AHI of 10.  Previous MSLT in 2011 showed a Mean Sleep latency of 2.8 min on 5 naps.      Sleep schedule goes to bed around 10 PM, wakens 8 to 10 AM , and gets up during the night sometimes  patient reports sleeping up to 10 hours/day at times.  Symptoms experiences daytime somnolence , reports morning headaches , and naps during the day     [place] Sleepiness Scale Total Score: 16 (as previously reported 2/16/2022)  Stop Bang Score 4 (6/13/2023  1:09 PM)         Review of Systems   Constitutional:  Positive for malaise/fatigue. Negative for chills, fever and weight loss.   HENT:  Positive for congestion (constant runny nose) and hearing loss. Negative for nosebleeds, sinus pain, sore throat and tinnitus.    Eyes:         Presc glasses   Respiratory:  Negative for cough, sputum production, shortness of breath and wheezing.    Cardiovascular:  Negative for chest pain, palpitations, orthopnea and leg swelling.   Gastrointestinal:  Negative for heartburn.        Upper dentures, no swallowing issues    Neurological:  Positive for headaches (morning headaches). Negative for dizziness and tremors.   Psychiatric/Behavioral:  The patient has insomnia (maintaining sleep, uses bathroom alot).        Past Medical History:   Diagnosis Date    ADD (attention deficit disorder)     Anxiety     Arthritis     CTS (carpal tunnel syndrome)     B/L    Depression     History of abdominoplasty 3/15/2012    Hyperlipidemia     Hypothyroidism     Midline low back pain without sciatica 8/4/2015    Osteoporosis 8/19/2014    PATENT FORAMEN OVALE LEFT TO RIGHT SHUNT 12/8/2010       Past Surgical History:   Procedure Laterality Date    PB OPEN TX TRIMALLEOLAR ANKLE FX W/O FIX PST LIP Right 9/12/2022    Procedure: RIGHT  TRIMALLEOLAR OPEN REDUCTION INTERNAL FIXATION, RIGHT SYNDESMOSIS OPEN REDUCTION INTERNAL FIXATION;  Surgeon: Trey Conway M.D.;  Location: [facility];  Service: Orthopedics    ABDOMINOPLASTY  1977    APPENDECTOMY      OTHER      TUMMY TUCK, BREAST REDUCTION, LIPOSUCTION OF HIPS AND THIGHS.    PB MAMMARY DUCTOGRAM, SINGLE      HI ANESTH,LOLA HYST FOL NEURAXIAL ANAL/ANES      HI BREAST REDUCTION      TONSILLECTOMY      TUBAL COAGULATION LAPAROSCOPIC BILATERAL         Family History   Problem Relation Age of Onset    Hypertension Mother     Cancer Mother         UTERUS    Heart Disease Mother         Arteriosclerotic Cardiovascular Disease    Other Mother         Coronary Artery Bypass Graft    Psychiatric Illness Mother     Arthritis Mother     Cancer Father         PANCREATIC    Cancer Sister 51        BREAST    Breast Cancer Sister     Stroke Neg Hx     Diabetes Neg Hx     Lung Disease Neg Hx        Social History     Socioeconomic History    Marital status:      Spouse name: Not on file    Number of children: Not on file    Years of education: Not on file    Highest education level: Not on file   Occupational History    Not on file   Tobacco Use    Smoking status: Never    Smokeless tobacco: Never   Vaping Use    Vaping Use: Never used   Substance and Sexual Activity    Alcohol use: Yes     Comment: rare    Drug use: Yes     Types: Marijuana, Methamphetamines     Comment: marijuana several times a year, H/O METHAMPHETAMINE FOR 17 YRS.  QUIT 2008.    Sexual activity: Not Currently     Birth control/protection: Post-Menopausal   Other Topics Concern    Not on file   Social History Narrative    Not on file     Social Determinants of Health     Financial Resource Strain: Not on file   Food Insecurity: Not on file   Transportation Needs: Not on file   Physical Activity: Not on file   Stress: Not on file   Social Connections: Not on file   Intimate Partner Violence: Not on file  "  Housing Stability: Not on file       Allergies as of 06/12/2023 - Reviewed 06/01/2023   Allergen Reaction Noted    Hydrocodone  01/05/2009        Vitals:  /70   Pulse 100   Resp 16   Ht 1.575 m (5' 2\")   Wt 64.9 kg (143 lb)   SpO2 96%     Current medications as of today   Current Outpatient Medications   Medication Sig Dispense Refill    traZODone (DESYREL) 100 MG Tab TAKE 0.5 TO 1 TABLET EVERY NIGHT AT BEDTIME AS NEEDED FOR INSOMNIA      sertraline (ZOLOFT) 25 MG tablet Take 25 mg by mouth every day.      MAGNESIUM PO       Potassium Gluconate 595 MG Cap       lisinopril (PRINIVIL) 10 MG Tab Take 1 Tablet by mouth every day. 90 Tablet 2    amphetamine-dextroamphetamine (ADDERALL) 10 MG Tab Take 10 mg by mouth 2 times a day.      tolterodine ER (DETROL LA) 4 MG CAPSULE SR 24 HR       Calcium Carbonate-Vitamin D (CALCIUM-D PO) Take 2 Tablets by mouth every day.      ibuprofen (MOTRIN) 600 MG Tab 1 po tid 42 Tablet 0    acetaminophen (TYLENOL) 500 MG Tab 1 po tid (Patient not taking: Reported on 6/12/2023) 42 Tablet 0     No current facility-administered medications for this visit.         Physical Exam:   Gen:           Alert and oriented, No apparent distress. Mood and affect appropriate, normal interaction with examiner.   Hearing:     Grossly intact.  Nose:          Normal, no lesions or deformities.  Dentition:    Poor dentition lower, upper dentures.   Oropharynx:   Tongue normal, posterior pharynx without erythema or exudate.  Mallampati Classification: III  Neck:        Supple, trachea midline, no masses.  Respiratory Effort: No intercostal retractions or use of accessory muscles.   Gait and Station: Normal.  Digits and Nails: No clubbing, cyanosis, petechiae, or nodes.   Skin:        No rashes, lesions or ulcers noted.               Ext:           No cyanosis or edema.      Immunizations:  Flu: Recommended  Pneumovax 23: 9/5/2018  Prevnar 13: 10/6/2015  SARS Cov 2 vaccine: Declined    Assessment " / Plan:  1. Severe MAX (obstructive sleep apnea)  - DME BiPAP    Reviewed sleep study results, BiPAP is recommended and patient desires to pursue therapy.  We will place orders with [facility] in [place].  Patient given contact info.  Understands that she will need a follow-up appointment after on therapy for 30 to 60 days.  Reviewed therapeutic goals.  Patient instructed to bring entire device to first visit.      2. Hypersomnolence    Continue current regimen, follows with behavioral health.  May see improvement with PAP therapy.    Follow-up:   Return in about 3 months (around 9/12/2023) for Return with Yesi Self PA-C.    Please note that this dictation was created using voice recognition software. I have made every reasonable attempt to correct obvious errors, but it is possible there are errors of grammar and possibly content that I did not discover before finalizing the note.

## 2023-06-12 NOTE — PATIENT INSTRUCTIONS
1-reviewed sleep study results  2-Bipap is recommended  3-will place orders with [facility] in [place]  4-patient given contact info  5-oral dryness consider xylimelts   6-will need follow up appoint after on therapy for 30-60 days  7-reviewed therapeutic goals   8-bring entire device to first visit  9-3 month follow up

## 2023-06-19 ENCOUNTER — HOSPITAL ENCOUNTER (EMERGENCY)
Facility: MEDICAL CENTER | Age: 73
End: 2023-06-19
Attending: EMERGENCY MEDICINE
Payer: MEDICARE

## 2023-06-19 ENCOUNTER — APPOINTMENT (OUTPATIENT)
Dept: BEHAVIORAL HEALTH | Facility: MEDICAL CENTER | Age: 73
End: 2023-06-19
Attending: PSYCHIATRY & NEUROLOGY
Payer: MEDICARE

## 2023-06-19 VITALS
RESPIRATION RATE: 17 BRPM | HEIGHT: 62 IN | HEART RATE: 84 BPM | OXYGEN SATURATION: 91 % | BODY MASS INDEX: 26.45 KG/M2 | SYSTOLIC BLOOD PRESSURE: 118 MMHG | WEIGHT: 143.74 LBS | DIASTOLIC BLOOD PRESSURE: 89 MMHG | TEMPERATURE: 97.9 F

## 2023-06-19 DIAGNOSIS — H57.11 EYE PAIN, RIGHT: ICD-10-CM

## 2023-06-19 DIAGNOSIS — S05.01XA ABRASION OF RIGHT CORNEA, INITIAL ENCOUNTER: ICD-10-CM

## 2023-06-19 PROCEDURE — 700101 HCHG RX REV CODE 250: Mod: UD | Performed by: EMERGENCY MEDICINE

## 2023-06-19 PROCEDURE — 99284 EMERGENCY DEPT VISIT MOD MDM: CPT

## 2023-06-19 RX ORDER — PROPARACAINE HYDROCHLORIDE 5 MG/ML
1 SOLUTION/ DROPS OPHTHALMIC ONCE
Status: COMPLETED | OUTPATIENT
Start: 2023-06-19 | End: 2023-06-19

## 2023-06-19 RX ORDER — MOXIFLOXACIN 5 MG/ML
1 SOLUTION/ DROPS OPHTHALMIC 3 TIMES DAILY
Qty: 2 ML | Refills: 0 | Status: SHIPPED | OUTPATIENT
Start: 2023-06-19 | End: 2023-06-19 | Stop reason: SDUPTHER

## 2023-06-19 RX ORDER — MOXIFLOXACIN 5 MG/ML
1 SOLUTION/ DROPS OPHTHALMIC 3 TIMES DAILY
Qty: 3 ML | Refills: 0 | Status: ACTIVE | OUTPATIENT
Start: 2023-06-19 | End: 2023-06-26

## 2023-06-19 RX ORDER — MOXIFLOXACIN 5 MG/ML
1 SOLUTION/ DROPS OPHTHALMIC ONCE
Status: COMPLETED | OUTPATIENT
Start: 2023-06-19 | End: 2023-06-19

## 2023-06-19 RX ADMIN — PROPARACAINE HYDROCHLORIDE 1 DROP: 5 SOLUTION/ DROPS OPHTHALMIC at 18:15

## 2023-06-19 RX ADMIN — MOXIFLOXACIN 1 DROP: 5 SOLUTION/ DROPS OPHTHALMIC at 19:08

## 2023-06-19 RX ADMIN — FLUORESCEIN SODIUM 1 MG: 1 STRIP OPHTHALMIC at 18:15

## 2023-06-19 ASSESSMENT — FIBROSIS 4 INDEX: FIB4 SCORE: 2.01

## 2023-06-19 NOTE — ED TRIAGE NOTES
"Chief Complaint   Patient presents with    Eye Pain     Pt reports feeling a \"prick\" in her R eye x1 week. Pt reports associated blurred vision. Redness noted to R eye.      BP (!) 141/97   Pulse 100   Temp 36.8 °C (98.2 °F) (Temporal)   Resp 16   Ht 1.575 m (5' 2\")   Wt 65.2 kg (143 lb 11.8 oz)   SpO2 97%   BMI 26.29 kg/m²     Pt ambulatory to triage for above.   "

## 2023-06-20 NOTE — ED NOTES
PT walked back with no assistance needed, and was put on the monitor. Visual acuity done:    Left EYE: 20/50  Right EYE:20/100  Both EYEs: 20/70

## 2023-06-20 NOTE — ED NOTES
Discharge teaching and paperwork provided including information regarding Rx. Questions answered. VSS, assessment stable. Patient belonging with patient. Patient D/C to the care of self and ambulated out of the ED.

## 2023-06-20 NOTE — ED PROVIDER NOTES
"ED Provider Note    CHIEF COMPLAINT  Chief Complaint   Patient presents with    Eye Pain     Pt reports feeling a \"prick\" in her R eye x1 week. Pt reports associated blurred vision. Redness noted to R eye.        EXTERNAL RECORDS REVIEWED  Outpatient Notes outpatient note reviewed from pulmonary PA from 7 days ago.  She has severe sleep apnea.    HPI/ROS  LIMITATION TO HISTORY   Select: : None  OUTSIDE HISTORIAN(S):  None    CONCHITA CHAVEZ is a 72 y.o. female who presents with right eye pain onset one week ago. The patient notes that a week ago she had a \"prick\" feeling in her eye. She notes that this is a regular occurrence for her but she states that it usually is a blister under her eyelid which she usually punctures with her finger which resolves the pain. The patient wears extensive wear contacts which she sleeps in. She states that they are the monthly kind but she cleans them to make them last for two months. The patient states that her vision is blurry at this time. She reports associated eye watering and redness. The pain is exacerbated at night and in the morning she has to manually open her eye with her fingers because it corbin snot open on her own. The patient takes Adderall for hypersomnia and a blood pressure medication. She denies any history of diabetes.     PAST MEDICAL HISTORY   has a past medical history of ADD (attention deficit disorder), Anxiety, Arthritis, CTS (carpal tunnel syndrome), Depression, History of abdominoplasty (3/15/2012), Hyperlipidemia, Hypothyroidism, Midline low back pain without sciatica (8/4/2015), Osteoporosis (8/19/2014), and PATENT FORAMEN OVALE LEFT TO RIGHT SHUNT (12/8/2010).    SURGICAL HISTORY   has a past surgical history that includes anesth,isha hyst fol neuraxial anal/anes; appendectomy; tubal coagulation laparoscopic bilateral; other; tonsillectomy; mammary ductogram, single; breast reduction; abdominoplasty (1977); and open tx trimalleolar ankle fx w/o fix pst lip " "(Right, 9/12/2022).    FAMILY HISTORY  Family History   Problem Relation Age of Onset    Hypertension Mother     Cancer Mother         UTERUS    Heart Disease Mother         Arteriosclerotic Cardiovascular Disease    Other Mother         Coronary Artery Bypass Graft    Psychiatric Illness Mother     Arthritis Mother     Cancer Father         PANCREATIC    Cancer Sister 51        BREAST    Breast Cancer Sister     Stroke Neg Hx     Diabetes Neg Hx     Lung Disease Neg Hx        SOCIAL HISTORY  Social History     Tobacco Use    Smoking status: Never    Smokeless tobacco: Never   Vaping Use    Vaping Use: Never used   Substance and Sexual Activity    Alcohol use: Yes     Comment: rare    Drug use: Yes     Types: Marijuana, Methamphetamines     Comment: marijuana several times a year, H/O METHAMPHETAMINE FOR 17 YRS.  QUIT 2008.    Sexual activity: Not Currently     Birth control/protection: Post-Menopausal       CURRENT MEDICATIONS  Home Medications       Reviewed by Jossie Malik R.N. (Registered Nurse) on 06/19/23 at 1600  Med List Status: Partial     Medication Last Dose Status   acetaminophen (TYLENOL) 500 MG Tab  Active   amphetamine-dextroamphetamine (ADDERALL) 10 MG Tab  Active   Calcium Carbonate-Vitamin D (CALCIUM-D PO)  Active   ibuprofen (MOTRIN) 600 MG Tab  Active   lisinopril (PRINIVIL) 10 MG Tab  Active   MAGNESIUM PO  Active   Potassium Gluconate 595 MG Cap  Active   sertraline (ZOLOFT) 25 MG tablet  Active   tolterodine ER (DETROL LA) 4 MG CAPSULE SR 24 HR  Active   traZODone (DESYREL) 100 MG Tab  Active                    ALLERGIES  Allergies   Allergen Reactions    Hydrocodone      Nightmares and \"makes my skin crawl\"       PHYSICAL EXAM  VITAL SIGNS: BP (!) 141/97   Pulse 100   Temp 36.8 °C (98.2 °F) (Temporal)   Resp 16   Ht 1.575 m (5' 2\")   Wt 65.2 kg (143 lb 11.8 oz)   SpO2 97%   BMI 26.29 kg/m²      Constitutional: Alert in no apparent distress. Well apearing  HENT: Normocephalic, " Atraumatic, Bilateral external ears normal. Nose normal.   Eyes:  Conjunctiva normal, non-icteric. Right eye injected conjunctive, cornea is cloudy on regular slit lamp exam, areas of uptake in the center of cornea right over over pupil with fluorescence   Lungs: Non-labored respirations  Skin: Warm, Dry, No erythema, No rash.   Neurologic: Alert, Grossly non-focal.   Psychiatric: Affect normal, Judgment normal, Mood normal, Appears appropriate and not intoxicated.     DIAGNOSTIC STUDIES / PROCEDURES      COURSE & MEDICAL DECISION MAKING    6:41 PM - Patient seen and examined at bedside. Discussed plan of care, including eye exam. Patient agrees to the plan of care. The patient will be medicated with Vigamox 0.5% 1 drop for eye irritation.  I discussed plan for discharge and follow up as outlined below. The patient is stable for discharge at this time and will return for any new or worsening symptoms. Patient verbalizes understanding and support with my plan for discharge.      ED Observation Status? no    INITIAL ASSESSMENT, COURSE AND PLAN  Care Narrative: This is a 72-year-old female that presents with right eye irritation and pain.  She does wear her contacts excessively and sleeps in the.  She does have extended wear contacts.  She also prolonged the use of them they are usually monthly disposables but she does clean them so she can get more use out of them.  Her eye had normal pressures of 10-13 with Erasmo-Pen.  She had significant relief with administering proparacaine.  The whole anterior cornea looks somewhat cloudy and irritated.  With fluorescein there was diffuse uptake of this with distinct areas of uptake right in the center of the cornea in front of her pupil.  I do think she has evidence of significant conjunctivitis with abrasion likely secondary from overuse of her contacts.  I will place her on Vigamox and refer her to outpatient ophthalmology.  She is agreeable to this plan I advised her to not  use her contact for at least a week at this time.    DISPOSITION AND DISCUSSIONS  I have discussed management of the patient with the following physicians and ARMANDO's:  None    Discussion of management with other [facility] or appropriate source(s): None     Escalation of care considered, and ultimately not performed:blood analysis and diagnostic imaging    Barriers to care at this time, including but not limited to:  none .     Decision tools and prescription drugs considered including, but not limited to: Antibiotics   .    FINAL DIAGNOSIS  1. Abrasion of right cornea, initial encounter    2. Eye pain, right      The patient will return for new or worsening symptoms and is stable at the time of discharge.    The patient is referred to a primary physician for blood pressure management, diabetic screening, and for all other preventative health concerns.    DISPOSITION:  Patient will be discharged home in stable condition.    FOLLOW UP:  Luigi Solares M.D.  [address] [facility] [address]  [phone]    Schedule an appointment as soon as possible for a visit in 1 day  Call tomorrow for follow-up appointment.    [facility], Emergency Dept  [address] [place] [id number] [phone]    Return to the emergency department for worsening pain, change in vision or other concerns.      OUTPATIENT MEDICATIONS:  Discharge Medication List as of 6/19/2023  7:05 PM            Nevin TOMLIN (Lou), am scribing for, and in the presence of, Sarah Morales M.D..    Electronically signed by: Nevin Bedoya), 6/19/2023    Sarah TOMLIN M.D. personally performed the services described in this documentation, as scribed by Nevin Mathew in my presence, and it is both accurate and complete.     Electronically signed by: Sarah Morales M.D., 6/19/2023 6:06 PM

## 2023-06-21 ENCOUNTER — HOSPITAL ENCOUNTER (OUTPATIENT)
Dept: BEHAVIORAL HEALTH | Facility: MEDICAL CENTER | Age: 73
End: 2023-06-21
Attending: PSYCHIATRY & NEUROLOGY
Payer: MEDICARE

## 2023-06-21 PROCEDURE — 90853 GROUP PSYCHOTHERAPY: CPT | Performed by: SOCIAL WORKER

## 2023-06-21 NOTE — GROUP NOTE
Group Appointment Information    Date: 06/21/23   Attendance Duration: 40 minutes  Number of Participants: 5 participants  Program / Group: IOP - Intensive Outpatient Program  Topics Covered: Anger and Caring for Ourselves    In this last group, members completed their work sheets on anger and shared how they care for themselves and a take away from group today.      Group Therapy Start Time: 11:00 AM    Attendance: Attended  Participation: Active verbal participation    Affect/Mood Range: Normal range  Affect/Mood Display: CWC - Congruent w/Content  Cognition: Alert    Evidence of imminent suicide risk: No   Evidence of imminent homicide risk: No     Therapeutic Interventions: Cognitive clarification and Self-care skills  Progress Toward Treatment Goal: Mild improvement, Angeles Ruelas) would like to work on putting in more time for self care.

## 2023-06-21 NOTE — GROUP NOTE
Group Appointment Information    Date: 06/21/23   Attendance Duration: 60 minutes  Number of Participants: 6 participants  Program / Group: IOP - Intensive Outpatient Program  Topics Covered: Anger    Group members watched vignette on Anger , why we get angry and why it is good for us.      Group Therapy Start Time:  9:00 AM    Attendance: Attended  Participation: Active verbal participation    Affect/Mood Range: Normal range  Affect/Mood Display: CWC - Congruent w/Content  Cognition: Alert    Evidence of imminent suicide risk: No   Evidence of imminent homicide risk: No     Therapeutic Interventions: Psychoeducation and Supportive psychotherapy  Progress Toward Treatment Goal: No change, this was Kristen's (prefers [place]) first day in group. She actively participated.

## 2023-06-21 NOTE — GROUP NOTE
Group Appointment Information    Date: 06/21/23   Attendance Duration: 60 minutes  Number of Participants: 6 participants  Program / Group: IOP - Intensive Outpatient Program  Topics Covered: Anger and Regulating emotions    Topic continued from first group, focusing in on the Anger Iceberg and regulating this emotion.      Group Therapy Start Time: 10:00 AM    Attendance: Attended  Participation: Active verbal participation    Affect/Mood Range: Normal range  Affect/Mood Display: CWC - Congruent w/Content  Cognition: Alert    Evidence of imminent suicide risk: No   Evidence of imminent homicide risk: No     Therapeutic Interventions: Cognitive Modification and Communication skills  Progress Toward Treatment Goal: No change, While Angeles (Kristen) was able to grasp the topic at times she still held to negative ways to express anger. She explained this is because 'I no longer care I spent so much time be a pleaser I just don't care but I hope to get some of the caring back'.

## 2023-06-23 ENCOUNTER — HOSPITAL ENCOUNTER (OUTPATIENT)
Dept: BEHAVIORAL HEALTH | Facility: MEDICAL CENTER | Age: 73
End: 2023-06-23
Attending: PSYCHIATRY & NEUROLOGY
Payer: MEDICARE

## 2023-06-23 PROCEDURE — 90853 GROUP PSYCHOTHERAPY: CPT | Performed by: SOCIAL WORKER

## 2023-06-23 NOTE — GROUP NOTE
"Group Appointment Information    Date: 06/23/23   Attendance Duration: 60 minutes  Number of Participants: 7 participants  Program / Group: IOP - Intensive Outpatient Program  Topics Covered: Codependency    Group completed fact sheet on codependency and watched vignette on 'clean vs dirty pain, are we causing our own pain?\"      Group Therapy Start Time:  9:00 AM    Attendance: Attended  Participation: Active verbal participation    Affect/Mood Range: Normal range  Affect/Mood Display: CWC - Congruent w/Content  Cognition: Alert    Evidence of imminent suicide risk: No   Evidence of imminent homicide risk: No     Therapeutic Interventions: Psychoeducation and Interpersonal effectiveness skills  Progress Toward Treatment Goal: Mild improvement, Kristen completed her worksheet and expressed this was how she use to be.  "

## 2023-06-24 NOTE — GROUP NOTE
Group Appointment Information    Date: 06/23/23   Attendance Duration: 60 minutes  Number of Participants: 7 participants  Program / Group: IOP - Intensive Outpatient Program  Topics Covered: Codependency    Continued to work on handouts and discuss how codependency impacts  relationships and how sometimes through this codependency we may be creating some of our pain.      Group Therapy Start Time: 10:00 AM    Attendance: Attended  Participation: Active verbal participation    Affect/Mood Range: Normal range  Affect/Mood Display: CWC - Congruent w/Content  Cognition: Alert    Evidence of imminent suicide risk: No   Evidence of imminent homicide risk: No     Therapeutic Interventions: Interpersonal effectiveness skills  Progress Toward Treatment Goal: Mild improvement, Kristen seems to understand the topic but does not see it in her own interactions. She reports she does not like people much but then will discuss she feels she is missing something.

## 2023-06-24 NOTE — GROUP NOTE
Group Appointment Information    Date: 06/23/23   Attendance Duration: 30 minutes  Number of Participants: 7 participants  Program / Group: IOP - Intensive Outpatient Program  Topics Covered: Codependency    Continued topic and wrap up.      Group Therapy Start Time: 11:00 AM    Attendance: Attended  Participation: Active verbal participation    Affect/Mood Range: Normal range  Affect/Mood Display: CWC - Congruent w/Content  Cognition: Alert    Evidence of imminent suicide risk: No   Evidence of imminent homicide risk: No     Therapeutic Interventions: Interpersonal effectiveness skills  Progress Toward Treatment Goal: Mild improvement

## 2023-06-26 ENCOUNTER — HOSPITAL ENCOUNTER (OUTPATIENT)
Dept: BEHAVIORAL HEALTH | Facility: MEDICAL CENTER | Age: 73
End: 2023-06-26
Attending: PSYCHIATRY & NEUROLOGY
Payer: MEDICARE

## 2023-06-26 PROCEDURE — 90853 GROUP PSYCHOTHERAPY: CPT | Performed by: SOCIAL WORKER

## 2023-06-27 NOTE — GROUP NOTE
Group Appointment Information    Date: 06/26/23   Attendance Duration: 60 minutes  Number of Participants: 9 participants  Program / Group: IOP - Intensive Outpatient Program  Topics Covered: Other (Comment):  Continuation from first group. More in-depth view of these topics looking at examples from personal experiences.      Group Therapy Start Time: 10:00 AM    Attendance: Attended  Participation: Active verbal participation    Affect/Mood Range: Normal range  Affect/Mood Display: CWC - Congruent w/Content  Cognition: Alert    Evidence of imminent suicide risk: No   Evidence of imminent homicide risk: No     Therapeutic Interventions: Interpersonal effectiveness skills  Progress Toward Treatment Goal: Mild improvement, Angeles Ruelas) appears to understand the concepts but seems to hold to old patterns that are not congruent with topic.

## 2023-06-27 NOTE — GROUP NOTE
Group Appointment Information    Date: 06/26/23   Attendance Duration: 60 minutes  Number of Participants: 9 participants  Program / Group: IOP - Intensive Outpatient Program  Topics Covered: Other (Comment):Vulnerability    Group members watched 2 vignettes on Vulnerability and Making Mistakes in Relationships. Education provided on the importance of both topics.      Group Therapy Start Time:  9:00 AM    Attendance: Attended  Participation: Attentive    Affect/Mood Range: Normal range  Affect/Mood Display: CWC - Congruent w/Content  Cognition: Alert    Evidence of imminent suicide risk: No   Evidence of imminent homicide risk: No     Therapeutic Interventions: Psychoeducation, Vulnerability and mistakes in relationships  Progress Toward Treatment Goal: Mild improvement

## 2023-06-27 NOTE — GROUP NOTE
"Group Appointment Information    Date: 06/26/23   Attendance Duration: 30 minutes  Number of Participants: 8 participants  Program / Group: IOP - Intensive Outpatient Program  Topics Covered: Other (Comment): Wrap of topics on vulnerability  and mistakes in relationships. Reading handout on Leeanna Greenfield's 4 life lessons.      Group Therapy Start Time: 11:00 AM    Attendance: Attended  Participation: Active verbal participation    Affect/Mood Range: Normal range  Affect/Mood Display: CWC - Congruent w/Content  Cognition: Alert    Evidence of imminent suicide risk: No   Evidence of imminent homicide risk: No     Therapeutic Interventions: Interpersonal effectiveness skills  Progress Toward Treatment Goal: Mild improvement, Angeles (Kristen) reported that she still does not like people so the concepts were understandable but difficult.\"  "

## 2023-06-28 ENCOUNTER — HOSPITAL ENCOUNTER (OUTPATIENT)
Dept: BEHAVIORAL HEALTH | Facility: MEDICAL CENTER | Age: 73
End: 2023-06-28
Attending: PSYCHIATRY & NEUROLOGY
Payer: MEDICARE

## 2023-06-28 DIAGNOSIS — F33.1 MODERATE EPISODE OF RECURRENT MAJOR DEPRESSIVE DISORDER (HCC): ICD-10-CM

## 2023-06-28 PROCEDURE — 90853 GROUP PSYCHOTHERAPY: CPT | Performed by: MARRIAGE & FAMILY THERAPIST

## 2023-06-28 NOTE — GROUP NOTE
Group Appointment Information    Date: 06/28/23   Attendance Duration: 30 minutes  Number of Participants: 6 participants  Program / Group: IOP - Intensive Outpatient Program  Topics Covered: Care in Relationships      Group Therapy Start Time: 11:00 AM    Attendance: Attended  Participation: Active verbal participation    Affect/Mood Range: Normal range  Affect/Mood Display: CWC - Congruent w/Content  Cognition: Alert and Oriented    Evidence of imminent suicide risk: No   Evidence of imminent homicide risk: No     Therapeutic Interventions: Emotion clarification and Supportive psychotherapy  Progress Toward Treatment Goal: Mild improvement; Patient continued to process feelings and share experience with fellow group members.

## 2023-06-28 NOTE — GROUP NOTE
Group Appointment Information    Date: 06/28/23   Attendance Duration: 60 minutes  Number of Participants: 6 participants  Program / Group: IOP - Intensive Outpatient Program  Topics Covered: Care in Relationships      Group Therapy Start Time: 10:00 AM    Attendance: Attended  Participation: Active verbal participation    Affect/Mood Range: Normal range  Affect/Mood Display: CWC - Congruent w/Content  Cognition: Alert and Oriented    Evidence of imminent suicide risk: No   Evidence of imminent homicide risk: No     Therapeutic Interventions: Emotion clarification and Supportive psychotherapy  Progress Toward Treatment Goal: Moderate improvement; client is open and caring toward herself and fellow group members.

## 2023-06-28 NOTE — GROUP NOTE
Group Appointment Information    Date: 06/28/23   Attendance Duration: 60 minutes  Number of Participants: 6 participants  Program / Group: IOP - Intensive Outpatient Program  Topics Covered: Relationships in Recovery      Group Therapy Start Time:  9:00 AM    Attendance: Attended  Participation: Active verbal participation    Affect/Mood Range: Normal range  Affect/Mood Display: CWC - Congruent w/Content  Cognition: Alert and Oriented    Evidence of imminent suicide risk: No   Evidence of imminent homicide risk: No     Therapeutic Interventions: Psychoeducation and Cognitive clarification  Progress Toward Treatment Goal: Moderate improvement; Client is reportedly readying to finish program and she feels more motivated than she has in the recent past.

## 2023-06-29 ENCOUNTER — OFFICE VISIT (OUTPATIENT)
Dept: MEDICAL GROUP | Facility: MEDICAL CENTER | Age: 73
End: 2023-06-29
Attending: FAMILY MEDICINE
Payer: MEDICARE

## 2023-06-29 VITALS
DIASTOLIC BLOOD PRESSURE: 84 MMHG | SYSTOLIC BLOOD PRESSURE: 124 MMHG | BODY MASS INDEX: 26.91 KG/M2 | HEIGHT: 62 IN | HEART RATE: 82 BPM | WEIGHT: 146.2 LBS | TEMPERATURE: 97.9 F | OXYGEN SATURATION: 98 % | RESPIRATION RATE: 16 BRPM

## 2023-06-29 DIAGNOSIS — F15.11 HISTORY OF METHAMPHETAMINE ABUSE (HCC): ICD-10-CM

## 2023-06-29 DIAGNOSIS — F10.11 H/O ALCOHOL ABUSE: ICD-10-CM

## 2023-06-29 DIAGNOSIS — Z02.89 ENCOUNTER FOR COMPLETION OF FORM WITH PATIENT: ICD-10-CM

## 2023-06-29 PROCEDURE — 3074F SYST BP LT 130 MM HG: CPT | Performed by: FAMILY MEDICINE

## 2023-06-29 PROCEDURE — 99213 OFFICE O/P EST LOW 20 MIN: CPT | Performed by: FAMILY MEDICINE

## 2023-06-29 PROCEDURE — 3079F DIAST BP 80-89 MM HG: CPT | Performed by: FAMILY MEDICINE

## 2023-06-29 PROCEDURE — 99212 OFFICE O/P EST SF 10 MIN: CPT | Performed by: FAMILY MEDICINE

## 2023-06-29 ASSESSMENT — FIBROSIS 4 INDEX: FIB4 SCORE: 2.01

## 2023-06-29 NOTE — PROGRESS NOTES
"Subjective   Chief Complaint   Patient presents with    Paperwork        HPI:     History of methamphetamine abuse (HCC)  Last used 15 years ago    H/O alcohol abuse  Remote history of heavy alcohol use 20 years ago. Still drinks on occasion but not in excess.    Encounter for completion of form with patient  Kristen presents today for form completion for routine physical to participate in group therapy. She is feeling generally well.       Objective   Social History     Tobacco Use    Smoking status: Never    Smokeless tobacco: Never   Vaping Use    Vaping Use: Never used   Substance Use Topics    Alcohol use: Yes     Comment: rare    Drug use: Yes     Types: Marijuana, Methamphetamines     Comment: marijuana several times a year, H/O METHAMPHETAMINE FOR 17 YRS.  QUIT 2008.       Exam:  /84 (BP Location: Right arm, Patient Position: Sitting, BP Cuff Size: Adult)   Pulse 82   Temp 36.6 °C (97.9 °F) (Temporal)   Resp 16   Ht 1.575 m (5' 2\")   Wt 66.3 kg (146 lb 3.2 oz)   SpO2 98%   BMI 26.74 kg/m²     Physical Exam  Constitutional:       General: She is not in acute distress.     Appearance: Normal appearance. She is normal weight. She is not ill-appearing.   HENT:      Head: Normocephalic.      Right Ear: Tympanic membrane and external ear normal.      Left Ear: Tympanic membrane and external ear normal.      Nose: Nose normal.      Mouth/Throat:      Mouth: Mucous membranes are moist.   Eyes:      Extraocular Movements: Extraocular movements intact.      Conjunctiva/sclera: Conjunctivae normal.      Pupils: Pupils are equal, round, and reactive to light.   Cardiovascular:      Rate and Rhythm: Normal rate and regular rhythm.   Pulmonary:      Effort: Pulmonary effort is normal.      Breath sounds: Normal breath sounds.   Abdominal:      General: Abdomen is flat. Bowel sounds are normal.      Palpations: Abdomen is soft.   Musculoskeletal:         General: Normal range of motion.   Skin:     General: Skin " "is warm and dry.   Neurological:      General: No focal deficit present.      Mental Status: She is alert and oriented to person, place, and time. Mental status is at baseline.   Psychiatric:         Mood and Affect: Mood normal.         Behavior: Behavior normal.         Allergies   Allergen Reactions    Hydrocodone      Nightmares and \"makes my skin crawl\"       [id number] - [place], [place] - [address] AT [address] & [place]  [address] [place] [address]  Phone: [phone] Fax: [phone]    [facility] [place]  [address] [place] [address]  Phone: [phone] Fax: [phone]    Current Outpatient Medications   Medication Sig Dispense Refill    traZODone (DESYREL) 100 MG Tab TAKE 0.5 TO 1 TABLET EVERY NIGHT AT BEDTIME AS NEEDED FOR INSOMNIA      sertraline (ZOLOFT) 25 MG tablet Take 25 mg by mouth every day.      MAGNESIUM PO       Potassium Gluconate 595 MG Cap       lisinopril (PRINIVIL) 10 MG Tab Take 1 Tablet by mouth every day. 90 Tablet 2    amphetamine-dextroamphetamine (ADDERALL) 10 MG Tab Take 10 mg by mouth 2 times a day.      tolterodine ER (DETROL LA) 4 MG CAPSULE SR 24 HR       Calcium Carbonate-Vitamin D (CALCIUM-D PO) Take 2 Tablets by mouth every day.      ibuprofen (MOTRIN) 600 MG Tab 1 po tid 42 Tablet 0     No current facility-administered medications for this visit.       Assessment & Plan    72 y.o. female with the following -     1. Encounter for completion of form with patient  - Patient required PE form completion for behavioral health. PE performed today within normal. Form completed for patient with copy scanned into chart    2. History of methamphetamine abuse (HCC)  - Remote history decades ago; encouraged continued abstinence    3. H/O alcohol abuse  - Currently with rare, social use. Encouraged continued sobriety.    Return if symptoms worsen or fail to improve.    Please note that this dictation was created using voice recognition software. I " have made every reasonable attempt to correct obvious errors, but I expect that there are errors of grammar and possibly content that I did not discover before finalizing the note.

## 2023-06-30 ENCOUNTER — HOSPITAL ENCOUNTER (OUTPATIENT)
Dept: BEHAVIORAL HEALTH | Facility: MEDICAL CENTER | Age: 73
End: 2023-06-30
Attending: PSYCHIATRY & NEUROLOGY
Payer: MEDICARE

## 2023-06-30 DIAGNOSIS — F33.1 MODERATE EPISODE OF RECURRENT MAJOR DEPRESSIVE DISORDER (HCC): ICD-10-CM

## 2023-06-30 DIAGNOSIS — F33.2 SEVERE EPISODE OF RECURRENT MAJOR DEPRESSIVE DISORDER, WITHOUT PSYCHOTIC FEATURES (HCC): ICD-10-CM

## 2023-06-30 PROCEDURE — 90853 GROUP PSYCHOTHERAPY: CPT | Performed by: MARRIAGE & FAMILY THERAPIST

## 2023-06-30 NOTE — PROGRESS NOTES
" [facility]  Therapy Progress Note    Patient Name: Kristen Carrera  Patient MRN: [id number]  Today's Date: 6/30/2023     Type of session:Individual psychotherapy  Length of session: 45 minutes  Persons in attendance:Patient    Subjective/New Info: Pt. Reported she feels her depression stems from her relationship with her daughter who is \"gruff with her.  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  Lately she \"does not feel motivated to do stuff.\"  She said she lacks energy , and has been having difficulty with sleep.      Objective/Observations:   Participation: Active verbal participation   Grooming: Casual   Cognition: Alert and Fully Oriented   Eye contact: Good   Mood: Euthymic   Affect: Flexible and Full range   Thought process: Logical and Goal-directed   Speech: Rate within normal limits and Volume within normal limits   Other:     Diagnoses:   1. Severe episode of recurrent major depressive disorder, without psychotic features (HCC)         Current risk:   SUICIDE: Low   Homicide: Low   Self-harm: Low   Relapse: Low   Other:    Safety Plan reviewed? Not Indicated   If evidence of imminent risk is present, intervention/plan:     Therapeutic Intervention(s): Stressors assessed and Supportive psychotherapy    Treatment Goal(s)/Objective(s) addressed: Patient will benefit from engaging in more activities, including artistic endeavors and this was suggested to patient.      Progress toward Treatment Goals: Mild improvement    Plan:  - Continue Intensive Outpatient Program    BOBBY Gamez  6/30/2023                                  "

## 2023-06-30 NOTE — GROUP NOTE
Group Appointment Information    Date: 06/30/23   Attendance Duration: 60 minutes  Number of Participants: 6 participants  Program / Group: IOP - Intensive Outpatient Program  Topics Covered: Regulating emotions      Group Therapy Start Time: 10:00 AM    Attendance: Attended  Participation: Active verbal participation    Affect/Mood Range: Normal range  Affect/Mood Display: CWC - Congruent w/Content  Cognition: Alert and Oriented    Evidence of imminent suicide risk: No   Evidence of imminent homicide risk: No     Therapeutic Interventions: Emotion clarification and Supportive psychotherapy  Progress Toward Treatment Goal: No change; client had something happen before she got to group and was not wiling to share out with the group.

## 2023-06-30 NOTE — GROUP NOTE
Group Appointment Information    Date: 06/30/23   Attendance Duration: 60 minutes  Number of Participants: 6 participants  Program / Group: IOP - Intensive Outpatient Program  Topics Covered: Other (Comment):codependency      Group Therapy Start Time:  9:00 AM    Attendance: Attended  Participation: Active verbal participation    Affect/Mood Range: Normal range  Affect/Mood Display: CWC - Congruent w/Content  Cognition: Alert and Oriented    Evidence of imminent suicide risk: No   Evidence of imminent homicide risk: No     Therapeutic Interventions: Psychoeducation and Cognitive clarification  Progress Toward Treatment Goal: Moderate improvement; Client shared out and was very supportive of others.

## 2023-06-30 NOTE — GROUP NOTE
Group Appointment Information    Date: 06/30/23   Attendance Duration: 30 minutes  Number of Participants: 6 participants  Program / Group: IOP - Intensive Outpatient Program  Topics Covered: Other (Comment): relaxation.       Group Therapy Start Time: 11:00 AM    Attendance: Attended  Participation: Active verbal participation    Affect/Mood Range: Normal range  Affect/Mood Display: CWC - Congruent w/Content  Cognition: Alert and Oriented    Evidence of imminent suicide risk: No   Evidence of imminent homicide risk: No     Therapeutic Interventions: Emotion clarification and Supportive psychotherapy  Progress Toward Treatment Goal: Mild improvement;  client shared out on a drawing that they made after a guided meditation and reported this activity helped them get in an improved state of mind.

## 2023-07-03 ENCOUNTER — HOSPITAL ENCOUNTER (OUTPATIENT)
Dept: BEHAVIORAL HEALTH | Facility: MEDICAL CENTER | Age: 73
End: 2023-07-03
Attending: PSYCHIATRY & NEUROLOGY
Payer: MEDICARE

## 2023-07-03 ENCOUNTER — OFFICE VISIT (OUTPATIENT)
Dept: NEUROLOGY | Facility: MEDICAL CENTER | Age: 73
End: 2023-07-03
Attending: PSYCHIATRY & NEUROLOGY
Payer: MEDICARE

## 2023-07-03 VITALS — HEIGHT: 62 IN | BODY MASS INDEX: 26.61 KG/M2 | WEIGHT: 144.62 LBS

## 2023-07-03 DIAGNOSIS — F10.11 HISTORY OF ALCOHOL ABUSE: ICD-10-CM

## 2023-07-03 DIAGNOSIS — F32.4 MAJOR DEPRESSIVE DISORDER IN PARTIAL REMISSION, UNSPECIFIED WHETHER RECURRENT (HCC): ICD-10-CM

## 2023-07-03 DIAGNOSIS — G47.33 OSA (OBSTRUCTIVE SLEEP APNEA): ICD-10-CM

## 2023-07-03 DIAGNOSIS — R41.3 COMPLAINTS OF MEMORY DISTURBANCE: Primary | ICD-10-CM

## 2023-07-03 PROCEDURE — 99212 OFFICE O/P EST SF 10 MIN: CPT | Performed by: PSYCHIATRY & NEUROLOGY

## 2023-07-03 PROCEDURE — 99205 OFFICE O/P NEW HI 60 MIN: CPT | Performed by: PSYCHIATRY & NEUROLOGY

## 2023-07-03 RX ORDER — LISINOPRIL 10 MG/1
1 TABLET ORAL
COMMUNITY
End: 2023-07-03

## 2023-07-03 RX ORDER — DEXTROAMPHETAMINE SACCHARATE, AMPHETAMINE ASPARTATE MONOHYDRATE, DEXTROAMPHETAMINE SULFATE AND AMPHETAMINE SULFATE 7.5; 7.5; 7.5; 7.5 MG/1; MG/1; MG/1; MG/1
CAPSULE, EXTENDED RELEASE ORAL
COMMUNITY
End: 2023-07-03

## 2023-07-03 RX ORDER — SERTRALINE HYDROCHLORIDE 25 MG/1
1 TABLET, FILM COATED ORAL
COMMUNITY
End: 2023-07-03

## 2023-07-03 RX ORDER — MIRTAZAPINE 15 MG/1
1 TABLET, FILM COATED ORAL
COMMUNITY
End: 2023-07-03

## 2023-07-03 RX ORDER — LAMOTRIGINE 25 MG/1
TABLET ORAL
COMMUNITY
End: 2023-10-11

## 2023-07-03 RX ORDER — TROSPIUM CHLORIDE 20 MG/1
1 TABLET, FILM COATED ORAL EVERY MORNING
COMMUNITY
End: 2023-07-03

## 2023-07-03 ASSESSMENT — PATIENT HEALTH QUESTIONNAIRE - PHQ9
SUM OF ALL RESPONSES TO PHQ QUESTIONS 1-9: 18
5. POOR APPETITE OR OVEREATING: 1 - SEVERAL DAYS
CLINICAL INTERPRETATION OF PHQ2 SCORE: 6

## 2023-07-03 ASSESSMENT — FIBROSIS 4 INDEX: FIB4 SCORE: 2.01

## 2023-07-03 NOTE — PROGRESS NOTES
"Reason for Neurology Consult: memory disturbances x 4-5 years.    History of present illness:    Kristen Evansp 72 y.o. right handed woman who is from [facility] and finished High School in [place] and then went to a 9 [employer] ([place]). She worked as a [employer] in [place] and/or [place].    for over 10 years.  Lives alone in the [facility].    She is here for an evaluation for memory complaints.  About 4-5 years she was noticing slightly issue with her memory such as when in the middle of doing anything that she could have problems recalling where an ingredient was noted.    In the last 2-3 years she has noticed \"all the time\" she will lose things or misplacing things (can of coffee, power tools,keys (rare as she has a hook on the wall for them) and glasses phone.      No paranoia,delusions,hallucinations in the last 6-12 months.    Suicidal- age 10 (took 15 Aspirin) and then age 19 (cut  right wrist with a razor blade - no major bleeding). Age 37 (took bottle of Pain Pills> took an entire bottle)> boyfriend found bottle and paramedics came and she threw pills away and did not get taken to hospital.    No REM Behavioral symptoms; takes Adderal (for hypersomloence> via \"[facility]\"   Denies snoring or self arousals.  Typically gets 9-11 hours most night but awakens for group Rx (3 days a week).    Last time she was subjectively suicidal was about 15 years ago > called Suicidal Hotline when at [facility]. She was admitted for 9 Days at a [facility] associated facility.    She has been treated for Depression (Major) and lesser so Anxiety issues.  Has been treated for Depression x 15-20 years but is going to Group Rx at [facility] (Behavior th Program).      No history of concussions, seizure type events (or documented epilepsy) or stroke(s) in her adult.    No recurrent falls or near falls in the last 6 months and an accidental fall on her daughter's birthday- injured (Broke) right " "ankle area.    No dysarthria,dysphagia,diplopia (or other visual disturbances) in the last 6 months or so.    No headaches or specific evolving pains  (physical type) ongoing in the last several months.    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 She was taking this medication to \"motivate self to do things\" (when to Dr. Pham- Sleep Specialist at [facility] who is since retired).    Denies evolving gait-balance or postural instability in the last 6-12 months.    Denies any discrete episodes of migraine(s), unilateral vision loss or evolving visual disturbances in the last 6 months or so.    Denies involuntary movements of the limbs in the last 6-12 months or so.    Denies absolutely no problems with her driving or operating a motorized vehicle in the last 6-12 months or so.    No significant tobacco use.    Significant alcohol use- stopped at age 45 drinking excessively.  Prior to that was drinking 1/5 Teddy Vadim or Vimal Ryan about 2-3 times a week.  Still drinks (2-3 times per month)- 4 shots within 1 down    Family Hx: no notable memory,cognitive,dementia issues.  Mother:  in her mid 60's (MI)- long time smoker.  Father: Pancreatic Cancer (non smoker)- in his 70s  Sister: age 74 (lives in [place]).  Sister: age 66 (breast cancer x 2)> in her late 50's (1st time)> no mastectomy.  Brother: age 65         Patient Active Problem List    Diagnosis Date Noted    History of methamphetamine abuse (HCC) 2023    H/O alcohol abuse 2023    Encounter for completion of form with patient 2023    Severe episode of recurrent major depressive disorder, without psychotic features (HCC) 2023    Vision loss, bilateral 2023    Overweight (BMI 25.0-29.9) 2023    H/O fracture of ankle 2022    Severe MAX (obstructive sleep apnea) 2022    Urinary incontinence 10/28/2021    Primary hypertension 10/18/2018    Insomnia 2018    " Memory loss 09/05/2018    Moderate episode of recurrent major depressive disorder (HCC) 10/06/2015    Midline low back pain without sciatica 08/04/2015    Osteopenia 08/19/2014    Family history of colon cancer requiring screening colonoscopy 10/25/2012    Hypersomnolence 01/17/2012    Fatigue 11/08/2011    PATENT FORAMEN OVALE LEFT TO RIGHT SHUNT txed w/ asa- has seen cardio dr salcedo 12/08/2010    Herpes, genital 10/12/2010    ADD (attention deficit disorder)     History of hypothyroidism     Hyperlipidemia        Past medical history:   Past Medical History:   Diagnosis Date    ADD (attention deficit disorder)     Anxiety     Arthritis     CTS (carpal tunnel syndrome)     B/L    Depression     History of abdominoplasty 3/15/2012    Hyperlipidemia     Hypothyroidism     Midline low back pain without sciatica 8/4/2015    Osteoporosis 8/19/2014    PATENT FORAMEN OVALE LEFT TO RIGHT SHUNT 12/8/2010       Past surgical history:   Past Surgical History:   Procedure Laterality Date    PB OPEN TX TRIMALLEOLAR ANKLE FX W/O FIX PST LIP Right 9/12/2022    Procedure: RIGHT TRIMALLEOLAR OPEN REDUCTION INTERNAL FIXATION, RIGHT SYNDESMOSIS OPEN REDUCTION INTERNAL FIXATION;  Surgeon: Trey Conway M.D.;  Location: [facility];  Service: Orthopedics    ABDOMINOPLASTY  1977    APPENDECTOMY      OTHER      TUMMY TUCK, BREAST REDUCTION, LIPOSUCTION OF HIPS AND THIGHS.    PB MAMMARY DUCTOGRAM, SINGLE      OR ANESTH,LOLA HYST FOL NEURAXIAL ANAL/ANES      OR BREAST REDUCTION      TONSILLECTOMY      TUBAL COAGULATION LAPAROSCOPIC BILATERAL           Social history:   Social History     Socioeconomic History    Marital status:      Spouse name: Not on file    Number of children: Not on file    Years of education: Not on file    Highest education level: Not on file   Occupational History    Not on file   Tobacco Use    Smoking status: Never    Smokeless tobacco: Never   Vaping Use    Vaping Use: Never  "used   Substance and Sexual Activity    Alcohol use: Yes     Comment: rare    Drug use: Yes     Types: Marijuana, Methamphetamines     Comment: marijuana several times a year, H/O METHAMPHETAMINE FOR 17 YRS.  QUIT 2008.    Sexual activity: Not Currently     Birth control/protection: Post-Menopausal   Other Topics Concern    Not on file   Social History Narrative    Not on file     Social Determinants of Health     Financial Resource Strain: Not on file   Food Insecurity: Not on file   Transportation Needs: Not on file   Physical Activity: Not on file   Stress: Not on file   Social Connections: Not on file   Intimate Partner Violence: Not on file   Housing Stability: Not on file       Family history:   Family History   Problem Relation Age of Onset    Hypertension Mother     Cancer Mother         UTERUS    Heart Disease Mother         Arteriosclerotic Cardiovascular Disease    Other Mother         Coronary Artery Bypass Graft    Psychiatric Illness Mother     Arthritis Mother     Cancer Father         PANCREATIC    Cancer Sister 51        BREAST    Breast Cancer Sister     Stroke Neg Hx     Diabetes Neg Hx     Lung Disease Neg Hx          Current medications:   Current Outpatient Medications   Medication    traZODone (DESYREL) 100 MG Tab    sertraline (ZOLOFT) 25 MG tablet    MAGNESIUM PO    Potassium Gluconate 595 MG Cap    lisinopril (PRINIVIL) 10 MG Tab    amphetamine-dextroamphetamine (ADDERALL) 10 MG Tab    tolterodine ER (DETROL LA) 4 MG CAPSULE SR 24 HR    Calcium Carbonate-Vitamin D (CALCIUM-D PO)    ibuprofen (MOTRIN) 600 MG Tab     No current facility-administered medications for this visit.       Medication Allergy:  Allergies   Allergen Reactions    Hydrocodone      Nightmares and \"makes my skin crawl\"           Physical examination:   Vitals:    07/03/23 0918   Weight: 65.6 kg (144 lb 10 oz)   Height: 1.575 m (5' 2\")       Normal cephalic atraumatic.  There is full range of movement around the neck in " all directions without restrictions or discrete pain evoked triggers.  No lower extremity edema.  Right lateral large scar (has plate).      Neurological  Exam:      [place] Cognitive Assessment (MOCA) Version 7.1    Years of Education: 9 months of [employer]    TOTAL SCORE: 26/30  (to be scanned into the MEDIA section in the E.M.R.)          Mental status: Awake, alert and fully oriented to person, place, time, and situation. Normal attention and concentration.  Did not appear/act combative,irritable,anxious,paranoid/delusional or aggressive to or with me.    Speech and language: Speech is fluent without errors, clear, intact to repetition, and intact to naming.     Follows 3 step motor commands in sequence without significant delay and correctly.    Cranial nerve exam:  II: Pupils are equally round and reactive to light. Visual fields are intact by confrontation.  III, IV, VI: EOMI, no diplopia, no ptosis.  V: Sensation to light touch is normal over V1-3 distributions bilaterally.  .  VII: Facial movements are symmetrical. There is no facial droop. .  VIII: Hearing intact to soft speech and finger rub bilaterally  IX: Palate elevates symmetrically, uvula is midline. Dysarthria is not present.  XI: Shoulder shrug are symmetrical and strong.   XII: Tongue protrudes midline.      Motor exam:  Muscle tone is normal in all 4 limbs. and No abnormal movements appreciated.    There are no features of parkinsonism of the limbs.    Muscle strength:    Neck Flexors/Extensors: 5/5       Right  Left  Deltoid   5/5  5/5      Biceps   5/5  5/5  Triceps               5/5  5/5   Wrist extensors 5/5  5/5  Wrist flexors  5/5  5/5     5/5  5/5  Interossei  5/5  5/5  Thenar (APB)  5/5  5/5   Hip flexors  5/5  5/5  Quadriceps  5/5  5/5    Hamstrings  5/5  5/5  Dorsiflexors  5/5  5/5  Plantarflexors  5/5  5/5  Toe extension  5/5  5/5        Reflexes:       Right  Left  Biceps   2/4  2/4  Triceps              2/4  2/4  Brachioradialis             2/4  2/4  Knee jerk  2/4  2/4  Ankle jerk  2/4  2/4     Frontal release signs are absent    bilaterally toes are downgoing to plantar stimulation..    Coordination (finger-to-nose, heel/knee/shin, rapid alternating movements) was normal.     There was no ataxia, no tremors, and no dysmetria.     Station and gait >> easily stands up from exam chair without retropulsion,veering,leaning,swaying (to either side).     PHQ of 18 today.    Labs and Tests:    Next appt: 09/14/2023 at 01:00 PM in Pulmonary and Sleep Medicine (KYLAH CarrizalesAKRISHAN)     Dx: Osteopenia, unspecified location     0 Result Notes    1 Patient Communication             Component Ref Range & Units 4 mo ago  (2/22/23) 4 yr ago  (11/2/18) 7 yr ago  (3/15/16) 10 yr ago  (3/12/13) 11 yr ago  (6/12/12) 11 yr ago  (1/13/12) 12 yr ago  (10/19/10)   25-Hydroxy   Vitamin D 25 30 - 100 ng/mL 42  36 CM  23 Low  CM  77 CM              Component Ref Range & Units 4 mo ago 11 yr ago   Vitamin B12 -True Cobalamin 211 - 911 pg/mL 373  242    Resulting Agency  M M     0 Result Notes    1 Patient Communication             Component Ref Range & Units 4 mo ago  (2/22/23) 1 yr ago  (4/27/22) 4 yr ago  (11/2/18) 4 yr ago  (9/29/18) 7 yr ago  (3/15/16) 9 yr ago  (11/24/13) 9 yr ago  (9/27/13)   TSH 0.380 - 5.330 uIU/mL 1.180  3.200 CM  4.330 CM  4.310 CM              Component Ref Range & Units 4 mo ago  (2/22/23) 1 yr ago  (4/27/22) 1 yr ago  (10/21/21) 4 yr ago  (6/26/19) 4 yr ago  (11/2/18) 4 yr ago  (9/29/18) 4 yr ago  (9/28/18)   Sodium 135 - 145 mmol/L 141  143  135  142  144  141  141    Potassium 3.6 - 5.5 mmol/L 3.6  4.0  3.4 Low   4.2  3.4 Low   3.7  3.8    Chloride 96 - 112 mmol/L 108  108  97  109  109  110  108    Co2 20 - 33 mmol/L 23  24  24  25  27  21  24    Anion Gap 7.0 - 16.0 10.0  11.0  14.0  8.0 R  8.0 R  10.0 R  9.0 R    Glucose 65 - 99 mg/dL 109 High   98  118 High   97  100 High   98  96    Bun 8 - 22 mg/dL  20  22  16  21  18  21  27 High     Creatinine 0.50 - 1.40 mg/dL 0.75  0.85  1.14  0.92  0.93  0.97  1.08    Calcium 8.5 - 10.5 mg/dL 9.8  10.1  10.1  9.9  9.8  9.4  10.2    AST(SGOT) 12 - 45 U/L 22   14   18  14  15    ALT(SGPT) 2 - 50 U/L 10   12   14  8  9    Alkaline Phosphatase 30 - 99 U/L 50   76   39  51  57    Total Bilirubin 0.1 - 1.5 mg/dL 0.9   0.4   0.5  0.4  0.4    Albumin 3.2 - 4.9 g/dL 4.5   3.5   4.2  3.5  3.8    Total Protein 6.0 - 8.2 g/dL 7.4   7.5   7.1  6.9  7.7    Globulin 1.9 - 3.5 g/dL 2.9   4.0 High    2.9  3.4  3.9 High     A-G Ratio g/dL 1.6   0.9             NEUROIMAGIN/8/2018 5:21 PM     HISTORY/REASON FOR EXAM:  progressive memory loss     TECHNIQUE/EXAM DESCRIPTION AND NUMBER OF VIEWS:  MRI of the brain without contrast.     The study was performed on a [place]idan 3.0 Jessica MRI scanner. Spoiled-GRASS sagittal, thin-section T2 fast spin-echo axial, T1 coronal, and FLAIR coronal images were obtained of the whole brain.     FINDINGS:  The calvariae are normal. There are no extra-axial fluid collections. There is a pattern of mild cerebral atrophy manifest as prominence of sulcal markings over the convexities and vertex along with mild ventriculomegaly. There is a pattern of   mild supratentorial white matter disease with scattered foci of bright T2 and FLAIR signal in the subcortical and deep white matter of both hemispheres consistent with small vessel ischemic change versus demyelination or gliosis. There are patchy areas   of T2 and FLAIR signal hyperintensity within the garima consistent with chronic ischemic pontine gliosis. There is no mass effect or midline shift. There are no hemorrhagic lesions. The brainstem and posterior fossa structures are unremarkable.     Vascular flow voids in the carotid and vertebrobasilar arteries, [place], and dural venous sinuses are intact. The visualized paranasal sinuses and mastoid air cells appear clear.     IMPRESSION:     1.  No  "evidence of acute territorial infarct, intracranial hemorrhage or mass lesion.  2.  Mild diffuse cerebral substance loss.  3.  Mild microangiopathic ischemic change versus demyelination or gliosis.  4.  Chronic ischemic pontine gliosis.           Exam Ended: 09/08/18  5:22 PM Last Resulted: 09/09/18  7:29 AM               Impression/Plans/Recommendations:    Chronic Memory Disturbance for over 4 years now.    Global Deterioration Score in the 2 to 3 range.    History of Major Depression and going to Group Therapy [facility].    Takes Adderral.    Brain MRI reviewed from 2021 and showed no specific abnormalities.    No features of Parkinsonism at this time.      2. Major Depression Disorder- long standing.    3. Obstructive Sleep Apnea- awaiting for BIPAP evaluation.    Today, I reviewed the clinical criteria and reviewed several  scenarios of the differences being using the medical terms of normal brain aging (age associated memory impairment),  Mild Cognitive Impairment (MCI) and Dementia.    MCI is a syndrome but statistically and for the majority of patients  occurs due  to a more rapid aging of the brain tissue or potentially from injury to certain parts of one's brain ( often from such contributing factors as  the cumulative effects of alcohol, from one or more ischemic or hemmorhagic stroke(s), from neurodegeneration or long standing with/without suboptimally controlled Hypertension). It is for some of these potential factors as to why I recommend a brain imaging test (Head CT or Brain MRI) be done for the 1st time or in certain circumstances repeated for comparison purposes  as such imaging can suggest one or more factors as to the reason(s) for the person's cognitive/memory changes. In fact, a normal or \"age related\" finding on a brain imaging test in and of itself is useful clinical and objective information to have in the evaluation of a person who has either an acute, evolving or even chronic (months to years) " long cognitive/memory complaint.    Additional factors or contributors to Brain Health issues can be summarized in several books/references which I discussed as well today.     Goals going forwards include:    A. Paying attention to one's risk factors and reducing their prevalence or potential impact on one's changing memory/thinking> an excellent example would be to stop smoking, reduce or eliminate alcohol use (depending on the amount and frequency of usage), maintain good blood pressure control by buying a digital arm blood pressure cuff such as an OMRON Series 3 or 5 and checking one's blood pressure atleast 3 days per week (in the am and early afternoon) that the numbers are under 140/90 and working as needed with the primary care doctor  to optimize blood pressure control).      B. Encouraging proper sleep hygiene which for most adults is 7 to 8 hours of uninterrupted sleep per night.      C. Encouraging some form of exercise preferable aerobic forms to be done (4 to 5 days per week- 30 minutes minimum per day)> 150 minutes per week as a goal. Example activities could include fast walking (up a slight incline), jogging, cycling (road or stationary), swimming,tennis. Essentially, even basic walking on a flat surface or a treadmill would be better than doing nothing.    D. Maintaining or forming new social contacts with family and friends  and a positive attitude despite the concerns and/or ongoing issues with thinking and/or memory.    E. Eating well which means a diet low in salt  (under 2 grams per day), sugar and saturated fat.    F. Maintaining one's BMI (Body Mass Index) under 30.    G.  Discussed Brain  Health Topics and some books to read about Brain Health.    H. She will bring her grandson to next visit for more additional information.    I. Encouraged continued follow up with her Behavioral Health Group.    J. Repeat Brain MRI to compare with MRI from 2021 and additional blood work to be done.    K.  Encouraged abstaining for alcohol.    L. Referral to Behavioral Health for evaluation of Major Depressive Disorder> patient prefers to see Alyssa Carrion MD.    I am keeping up with editorials and  comments from  many academic neurologists throughout the [place] who are writing     I have performed  a history and physical exam and a directed /focused  ROS today.    Total time spent today or this patient's care was 60 minutes and included reviewing  the diagnostic workup to date (such as labs and imaging as well as interpreting such tests relevant to this patient's neurological condition),  reviewing/obtaining separately obtained history  for today's neurological problem(s) ,counseling and educating the patient and family member on issues related to cognition/memory and cognitive health factors and documenting  the clinical information in the EMR.    Follow up in about 6 months or so.        Michele Li MD  [employer].   [facility]

## 2023-07-05 ENCOUNTER — HOSPITAL ENCOUNTER (OUTPATIENT)
Dept: BEHAVIORAL HEALTH | Facility: MEDICAL CENTER | Age: 73
End: 2023-07-05
Attending: PSYCHIATRY & NEUROLOGY
Payer: MEDICARE

## 2023-07-05 ENCOUNTER — HOSPITAL ENCOUNTER (OUTPATIENT)
Dept: LAB | Facility: MEDICAL CENTER | Age: 73
End: 2023-07-05
Attending: PSYCHIATRY & NEUROLOGY
Payer: MEDICARE

## 2023-07-05 DIAGNOSIS — R41.3 COMPLAINTS OF MEMORY DISTURBANCE: ICD-10-CM

## 2023-07-05 DIAGNOSIS — F33.2 SEVERE RECURRENT MAJOR DEPRESSION WITHOUT PSYCHOTIC FEATURES (HCC): ICD-10-CM

## 2023-07-05 LAB
25(OH)D3 SERPL-MCNC: 54 NG/ML (ref 30–100)
FOLATE SERPL-MCNC: 4.4 NG/ML
VIT B12 SERPL-MCNC: 1183 PG/ML (ref 211–911)

## 2023-07-05 PROCEDURE — 82746 ASSAY OF FOLIC ACID SERUM: CPT

## 2023-07-05 PROCEDURE — 83921 ORGANIC ACID SINGLE QUANT: CPT

## 2023-07-05 PROCEDURE — 90853 GROUP PSYCHOTHERAPY: CPT | Performed by: MARRIAGE & FAMILY THERAPIST

## 2023-07-05 PROCEDURE — 82306 VITAMIN D 25 HYDROXY: CPT

## 2023-07-05 PROCEDURE — 36415 COLL VENOUS BLD VENIPUNCTURE: CPT

## 2023-07-05 PROCEDURE — 82607 VITAMIN B-12: CPT

## 2023-07-05 PROCEDURE — 84425 ASSAY OF VITAMIN B-1: CPT

## 2023-07-05 NOTE — GROUP NOTE
Group Appointment Information    Date: 07/05/23   Attendance Duration: 60 minutes  Number of Participants: 7 participants  Program / Group: IOP - Intensive Outpatient Program  Topics Covered: Belief Systems      Group Therapy Start Time:  9:00 AM    Attendance: Attended  Participation: Active verbal participation    Affect/Mood Range: Normal range  Affect/Mood Display: CWC - Congruent w/Content  Cognition: Alert and Oriented    Evidence of imminent suicide risk: No   Evidence of imminent homicide risk: No     Therapeutic Interventions: Psychoeducation and Cognitive clarification  Progress Toward Treatment Goal: Mild improvement; pt. Shared her experience over her birthday around she might have held a self-limiting belief in one situation and she considered how she might change that belief to something a little less upsetting.

## 2023-07-06 NOTE — GROUP NOTE
Group Appointment Information    Date: 07/05/23   Attendance Duration: 30 minutes  Number of Participants: 7 participants  Program / Group: IOP - Intensive Outpatient Program  Topics Covered: Other (Comment):; Self Limiting Beliefs      Group Therapy Start Time: 11:00 AM    Attendance: Attended  Participation: Active verbal participation    Affect/Mood Range: Normal range  Affect/Mood Display: CWC - Congruent w/Content  Cognition: Alert and Oriented    Evidence of imminent suicide risk: No   Evidence of imminent homicide risk: No     Therapeutic Interventions: Emotion clarification and Supportive psychotherapy  Progress Toward Treatment Goal: Moderate improvement; Pt. Continued to process during this the later part of the process portion of group.

## 2023-07-06 NOTE — GROUP NOTE
Group Appointment Information    Date: 07/05/23   Attendance Duration: 60 minutes  Number of Participants: 7 participants  Program / Group: IOP - Intensive Outpatient Program  Topics Covered: Regulating emotions      Group Therapy Start Time: 10:00 AM    Attendance: Attended  Participation: Active verbal participation    Affect/Mood Range: Normal range  Affect/Mood Display: CWC - Congruent w/Content  Cognition: Alert and Oriented    Evidence of imminent suicide risk: No   Evidence of imminent homicide risk: No     Therapeutic Interventions: Emotion clarification and Supportive psychotherapy  Progress Toward Treatment Goal: Mild improvement; client shared out about her relationship with her daughter and shared that her memory is a concern for her.

## 2023-07-07 ENCOUNTER — HOSPITAL ENCOUNTER (OUTPATIENT)
Dept: BEHAVIORAL HEALTH | Facility: MEDICAL CENTER | Age: 73
End: 2023-07-07
Attending: PSYCHIATRY & NEUROLOGY
Payer: MEDICARE

## 2023-07-07 DIAGNOSIS — F33.1 MODERATE EPISODE OF RECURRENT MAJOR DEPRESSIVE DISORDER (HCC): ICD-10-CM

## 2023-07-07 PROCEDURE — 90853 GROUP PSYCHOTHERAPY: CPT | Performed by: MARRIAGE & FAMILY THERAPIST

## 2023-07-07 NOTE — GROUP NOTE
Group Appointment Information    Date: 07/07/23   Attendance Duration: 60 minutes  Number of Participants: 8 participants  Program / Group: IOP - Intensive Outpatient Program  Topics Covered: Other (Comment):self compasion      Group Therapy Start Time:  9:00 AM    Attendance: Attended  Participation: Active verbal participation    Affect/Mood Range: Normal range  Affect/Mood Display: CWC - Congruent w/Content  Cognition: Alert and Oriented    Evidence of imminent suicide risk: No   Evidence of imminent homicide risk: No     Therapeutic Interventions: Psychoeducation and Cognitive clarification  Progress Toward Treatment Goal: Mild improvement; client shared out on how she is doing and said she had been sleeping a lot.

## 2023-07-07 NOTE — GROUP NOTE
Group Appointment Information    Date: 07/07/23   Attendance Duration: 30 minutes  Number of Participants: 8 participants  Program / Group: IOP - Intensive Outpatient Program  Topics Covered: Mindful practice      Group Therapy Start Time: 11:00 AM    Attendance: Attended  Participation: Active verbal participation    Affect/Mood Range: Normal range  Affect/Mood Display: CWC - Congruent w/Content  Cognition: Alert and Oriented    Evidence of imminent suicide risk: No   Evidence of imminent homicide risk: No     Therapeutic Interventions: Emotion clarification and Supportive psychotherapy  Progress Toward Treatment Goal: Mild improvement; pt. Is supportive of fellow group members and shared her concern for her memory and took comfort in knowing that she had not caused her memory problems and this helped her feel less distress.

## 2023-07-07 NOTE — GROUP NOTE
Group Appointment Information    Date: 07/07/23   Attendance Duration: 60 minutes  Number of Participants: 8 participants  Program / Group: IOP - Intensive Outpatient Program  Topics Covered: Mindful practice      Group Therapy Start Time: 10:00 AM    Attendance: Attended  Participation: Active verbal participation    Affect/Mood Range: Normal range  Affect/Mood Display: CWC - Congruent w/Content  Cognition: Alert and Oriented    Evidence of imminent suicide risk: No   Evidence of imminent homicide risk: No     Therapeutic Interventions: Emotion clarification and Supportive psychotherapy  Progress Toward Treatment Goal: Mild improvement; pt. Struggles with how her daughter and her family sometimes ignore patient and she feels they are inconsiderate of  her.

## 2023-07-08 LAB — METHYLMALONATE SERPL-SCNC: 0.2 UMOL/L (ref 0–0.4)

## 2023-07-09 LAB — VIT B1 BLD-MCNC: 83 NMOL/L (ref 70–180)

## 2023-07-10 ENCOUNTER — HOSPITAL ENCOUNTER (OUTPATIENT)
Dept: BEHAVIORAL HEALTH | Facility: MEDICAL CENTER | Age: 73
End: 2023-07-10
Attending: PSYCHIATRY & NEUROLOGY
Payer: MEDICARE

## 2023-07-10 DIAGNOSIS — F33.2 SEVERE RECURRENT MAJOR DEPRESSION WITHOUT PSYCHOTIC FEATURES (HCC): ICD-10-CM

## 2023-07-10 PROCEDURE — 90853 GROUP PSYCHOTHERAPY: CPT | Performed by: MARRIAGE & FAMILY THERAPIST

## 2023-07-10 NOTE — GROUP NOTE
"Group Appointment Information    Date: 07/10/23   Attendance Duration: 60 minutes  Number of Participants: 8 participants  Program / Group: IOP - Intensive Outpatient Program  Topics Covered: Other (Comment): process emotions.      Group Therapy Start Time: 10:00 AM    Attendance: Attended  Participation: Active verbal participation    Affect/Mood Range: Normal range  Affect/Mood Display: CWC - Congruent w/Content  Cognition: Alert and Oriented    Evidence of imminent suicide risk: No   Evidence of imminent homicide risk: No     Therapeutic Interventions: Emotion clarification and Supportive psychotherapy  Progress Toward Treatment Goal: No change; pt. Shared that she has a difficult time getting to group on time.  She also stated that she feels her life \"lacks meaning.\"    "

## 2023-07-10 NOTE — GROUP NOTE
Group Appointment Information    Date: 07/10/23   Attendance Duration: 60 minutes  Number of Participants: 8 participants  Program / Group: IOP - Intensive Outpatient Program  Topics Covered: Regulating emotions      Group Therapy Start Time:  9:00 AM    Attendance: Attended  Participation: Active verbal participation    Affect/Mood Range: Normal range  Affect/Mood Display: CWC - Congruent w/Content  Cognition: Alert and Oriented    Evidence of imminent suicide risk: No   Evidence of imminent homicide risk: No     Therapeutic Interventions: Psychoeducation and Cognitive clarification  Progress Toward Treatment Goal: Mild improvement; client shared out on how she is feeling.  She continues to struggle with memory (recollection) and expressed feelings of discouragement.

## 2023-07-10 NOTE — GROUP NOTE
Group Appointment Information    Date: 07/10/23   Attendance Duration: 30 minutes  Number of Participants: 8 participants  Program / Group: IOP - Intensive Outpatient Program  Topics Covered: Other (Comment):process feelings.       Group Therapy Start Time: 11:00 AM    Attendance: Attended  Participation: Active verbal participation    Affect/Mood Range: Normal range  Affect/Mood Display: CWC - Congruent w/Content  Cognition: Alert and Oriented    Evidence of imminent suicide risk: No   Evidence of imminent homicide risk: No     Therapeutic Interventions: Emotion clarification and Supportive psychotherapy  Progress Toward Treatment Goal: No change; pt. Reports to feel bad about her memory problems and feels a lack of meaning in her life.

## 2023-07-12 ENCOUNTER — HOSPITAL ENCOUNTER (OUTPATIENT)
Dept: BEHAVIORAL HEALTH | Facility: MEDICAL CENTER | Age: 73
End: 2023-07-12
Attending: PSYCHIATRY & NEUROLOGY
Payer: MEDICARE

## 2023-07-12 ENCOUNTER — DOCUMENTATION (OUTPATIENT)
Dept: BEHAVIORAL HEALTH | Facility: CLINIC | Age: 73
End: 2023-07-12
Payer: MEDICARE

## 2023-07-12 DIAGNOSIS — F33.1 MODERATE EPISODE OF RECURRENT MAJOR DEPRESSIVE DISORDER (HCC): ICD-10-CM

## 2023-07-12 PROCEDURE — 90853 GROUP PSYCHOTHERAPY: CPT | Performed by: MARRIAGE & FAMILY THERAPIST

## 2023-07-12 NOTE — GROUP NOTE
Group Appointment Information    Date: 07/12/23   Attendance Duration: 45 minutes  Number of Participants: 7 participants  Program / Group: IOP - Intensive Outpatient Program  Topics Covered: Other (Comment):Process emotions      Group Therapy Start Time: 11:00 AM    Attendance: Attended  Participation: Active verbal participation    Affect/Mood Range: Normal range  Affect/Mood Display: CWC - Congruent w/Content  Cognition: Alert and Oriented    Evidence of imminent suicide risk: No   Evidence of imminent homicide risk: No     Therapeutic Interventions: Emotion clarification and Supportive psychotherapy  Progress Toward Treatment Goal: Moderate improvement; patient shared her excitement about a trip she is going on.    Left a message to contact the clinic regarding a referral to Gynecology

## 2023-07-12 NOTE — GROUP NOTE
Group Appointment Information    Date: 07/12/23   Attendance Duration: 60 minutes  Number of Participants: 7 participants  Program / Group: IOP - Intensive Outpatient Program  Topics Covered: ACT concept intro      Group Therapy Start Time:  9:00 AM    Attendance: Attended  Participation: Active verbal participation    Affect/Mood Range: Normal range  Affect/Mood Display: CWC - Congruent w/Content  Cognition: Alert and Oriented    Evidence of imminent suicide risk: No   Evidence of imminent homicide risk: No     Therapeutic Interventions: Psychoeducation and Cognitive clarification  Progress Toward Treatment Goal: Mild improvement pt. Shared out stating, I need to get out of my house more.  And she was very supportive of fellow group members.

## 2023-07-12 NOTE — GROUP NOTE
Group Appointment Information    Date: 07/12/23   Attendance Duration: 60 minutes  Number of Participants: 7 participants  Program / Group: IOP - Intensive Outpatient Program  Topics Covered: ACT concept intro      Group Therapy Start Time: 10:00 AM    Attendance: Attended  Participation: Active verbal participation    Affect/Mood Range: Normal range  Affect/Mood Display: CWC - Congruent w/Content  Cognition: Alert and Oriented    Evidence of imminent suicide risk: No   Evidence of imminent homicide risk: No     Therapeutic Interventions: Emotion clarification and Supportive psychotherapy  Progress Toward Treatment Goal: Moderate improvement; client reported that she had spent time with her grandson and this always helps her feel happier.

## 2023-07-13 ENCOUNTER — TELEPHONE (OUTPATIENT)
Dept: NEUROLOGY | Facility: MEDICAL CENTER | Age: 73
End: 2023-07-13
Payer: MEDICARE

## 2023-07-13 NOTE — TELEPHONE ENCOUNTER
Kristen       Please take extra Folate (ie, Methyl Folate) every day.       I suggest going to any grocery store or pharmacy and buying an 800 microgram (mcg) tablet or capsule of Folate and taking 1 every day for the future.       Your Vitamin B12 blood level was mildly elevated but that is nothing at all to worry about.       Michele Li MD   [facility]   Neurology     Called spoke with pt, she states she had already read the message, I let her know on our end it states it has not been read and just wanted to make sure she is aware of dr Li instructions. Pt agreed and verbally understood.

## 2023-07-14 ENCOUNTER — HOSPITAL ENCOUNTER (OUTPATIENT)
Dept: BEHAVIORAL HEALTH | Facility: MEDICAL CENTER | Age: 73
End: 2023-07-14
Attending: PSYCHIATRY & NEUROLOGY
Payer: MEDICARE

## 2023-07-14 DIAGNOSIS — F33.2 SEVERE RECURRENT MAJOR DEPRESSION WITHOUT PSYCHOTIC FEATURES (HCC): ICD-10-CM

## 2023-07-14 PROCEDURE — 90853 GROUP PSYCHOTHERAPY: CPT | Performed by: MARRIAGE & FAMILY THERAPIST

## 2023-07-14 NOTE — GROUP NOTE
Group Appointment Information    Date: 07/14/23   Attendance Duration: 60 minutes  Number of Participants: 6 participants  Program / Group: IOP - Intensive Outpatient Program  Topics Covered: Care in Relationships      Group Therapy Start Time: 10:00 AM    Attendance: Attended  Participation: Active verbal participation    Affect/Mood Range: Normal range  Affect/Mood Display: CWC - Congruent w/Content  Cognition: Alert    Evidence of imminent suicide risk: No   Evidence of imminent homicide risk: No     Therapeutic Interventions: Emotion clarification and Supportive psychotherapy  Progress Toward Treatment Goal: Moderate improvement; patient shared that she doesn't leave her house much, and is working on getting out more.  She is very supportive of fellow pts.

## 2023-07-14 NOTE — GROUP NOTE
Group Appointment Information    Date: 07/14/23   Attendance Duration: 60 minutes  Number of Participants: 6 participants  Program / Group: IOP - Intensive Outpatient Program  Topics Covered: Relationships in Recovery      Group Therapy Start Time:  9:00 AM    Attendance: Attended  Participation: Active verbal participation    Affect/Mood Range: Normal range  Affect/Mood Display: CWC - Congruent w/Content  Cognition: Alert and Oriented    Evidence of imminent suicide risk: No   Evidence of imminent homicide risk: No     Therapeutic Interventions: Psychoeducation and Cognitive clarification  Progress Toward Treatment Goal: Mild improvement

## 2023-07-14 NOTE — GROUP NOTE
Group Appointment Information    Date: 07/14/23   Attendance Duration: 45 minutes  Number of Participants: 6 participants  Program / Group: IOP - Intensive Outpatient Program  Topics Covered: Other (Comment):process assertive communication and emotional regulation      Group Therapy Start Time: 11:00 AM    Attendance: Attended  Participation: Active verbal participation    Affect/Mood Range: Normal range  Affect/Mood Display: CWC - Congruent w/Content  Cognition: Alert and Oriented    Evidence of imminent suicide risk: No   Evidence of imminent homicide risk: No     Therapeutic Interventions: Emotion clarification and Supportive psychotherapy  Progress Toward Treatment Goal: Significant improvement; patient shared out and was supportive of others and processed ways to improve identify feelings at a more granular level to help feel less anxious and depressed.

## 2023-07-19 ENCOUNTER — TELEPHONE (OUTPATIENT)
Dept: NEUROLOGY | Facility: MEDICAL CENTER | Age: 73
End: 2023-07-19
Payer: MEDICARE

## 2023-07-19 NOTE — TELEPHONE ENCOUNTER
----- Message from Michele Li M.D. sent at 7/9/2023  6:19 AM PDT -----  Regarding: Regarding a Lab result  Arlen,      Please ensure this patient reads my email in MY CHART which she has not done yet about her Folate blood level.    My email (note) is also below:    ---------------------------------------------------  Kristen     Please take extra Folate (ie, Methyl Folate) every day.     I suggest going to any grocery store or pharmacy and buying an 800 microgram (mcg) tablet or capsule of Folate and taking 1 every day for the future.     Your Vitamin B12 blood level was mildly elevated but that is nothing at all to worry about.     Michele Li MD  [facility]   Neurology      Spoke to patient gave above information.

## 2023-07-24 ENCOUNTER — HOSPITAL ENCOUNTER (OUTPATIENT)
Dept: BEHAVIORAL HEALTH | Facility: MEDICAL CENTER | Age: 73
End: 2023-07-24
Attending: PSYCHIATRY & NEUROLOGY
Payer: MEDICARE

## 2023-07-24 DIAGNOSIS — F33.2 SEVERE RECURRENT MAJOR DEPRESSION WITHOUT PSYCHOTIC FEATURES (HCC): ICD-10-CM

## 2023-07-24 PROCEDURE — 90853 GROUP PSYCHOTHERAPY: CPT | Performed by: MARRIAGE & FAMILY THERAPIST

## 2023-07-25 ENCOUNTER — TELEMEDICINE (OUTPATIENT)
Dept: BEHAVIORAL HEALTH | Facility: CLINIC | Age: 73
End: 2023-07-25
Payer: MEDICARE

## 2023-07-25 DIAGNOSIS — F90.0 ATTENTION DEFICIT HYPERACTIVITY DISORDER (ADHD), PREDOMINANTLY INATTENTIVE TYPE: ICD-10-CM

## 2023-07-25 DIAGNOSIS — F33.2 SEVERE EPISODE OF RECURRENT MAJOR DEPRESSIVE DISORDER, WITHOUT PSYCHOTIC FEATURES (HCC): ICD-10-CM

## 2023-07-25 PROCEDURE — 99214 OFFICE O/P EST MOD 30 MIN: CPT | Mod: 95 | Performed by: PSYCHIATRY & NEUROLOGY

## 2023-07-25 PROCEDURE — 90833 PSYTX W PT W E/M 30 MIN: CPT | Mod: 95 | Performed by: PSYCHIATRY & NEUROLOGY

## 2023-07-25 NOTE — GROUP NOTE
Group Appointment Information    Date: 07/24/23   Attendance Duration: 30 minutes  Number of Participants: 5 participants  Program / Group: IOP - Intensive Outpatient Program  Topics Covered: Other (Comment):process emotions       Group Therapy Start Time: 11:00 AM    Attendance: Attended  Participation: Active verbal participation    Affect/Mood Range: Normal range  Affect/Mood Display: CWC - Congruent w/Content  Cognition: Alert and Oriented    Evidence of imminent suicide risk: No   Evidence of imminent homicide risk: No     Therapeutic Interventions: Emotion clarification and Supportive psychotherapy  Progress Toward Treatment Goal: Moderate improvement; pt. Appeared to make connections and support others   
Group Appointment Information    Date: 07/24/23   Attendance Duration: 60 minutes  Number of Participants: 4 participants  Program / Group: IOP - Intensive Outpatient Program  Topics Covered: ACT concept intro      Group Therapy Start Time:  9:00 AM    Attendance: Attended  Participation: Active verbal participation    Affect/Mood Range: Normal range  Affect/Mood Display: CWC - Congruent w/Content  Cognition: Alert and Oriented    Evidence of imminent suicide risk: No   Evidence of imminent homicide risk: No     Therapeutic Interventions: Psychoeducation and Cognitive clarification  Progress Toward Treatment Goal: Mild improvement; pt. Is supportive other others and is willing to be open and vulnerable with other group members .  
Group Appointment Information    Date: 07/24/23   Attendance Duration: 60 minutes  Number of Participants: 5 participants  Program / Group: IOP - Intensive Outpatient Program  Topics Covered: Other (Comment):process emotions      Group Therapy Start Time: 10:00 AM    Attendance: Attended  Participation: Active verbal participation    Affect/Mood Range: Normal range  Affect/Mood Display: CWC - Congruent w/Content  Cognition: Alert and Oriented    Evidence of imminent suicide risk: No   Evidence of imminent homicide risk: No     Therapeutic Interventions: Emotion clarification and Supportive psychotherapy  Progress Toward Treatment Goal: Mild improvement; pt. Shared out that she sometimes does not remember things.  She also shared out that she went over to [place] and then was disappointed that her daughter had a party for her great grandson that she had missed.  She was very disappointed.   
no
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
## 2023-07-26 ENCOUNTER — HOSPITAL ENCOUNTER (OUTPATIENT)
Dept: BEHAVIORAL HEALTH | Facility: MEDICAL CENTER | Age: 73
End: 2023-07-26
Attending: PSYCHIATRY & NEUROLOGY
Payer: MEDICARE

## 2023-07-26 DIAGNOSIS — F33.1 MODERATE EPISODE OF RECURRENT MAJOR DEPRESSIVE DISORDER (HCC): ICD-10-CM

## 2023-07-26 PROCEDURE — 90853 GROUP PSYCHOTHERAPY: CPT | Performed by: MARRIAGE & FAMILY THERAPIST

## 2023-07-26 NOTE — GROUP NOTE
Group Appointment Information    Date: 07/26/23   Attendance Duration: 45 minutes  Number of Participants: 5 participants  Program / Group: IOP - Intensive Outpatient Program  Topics Covered: Anger; process      Group Therapy Start Time: 11:00 AM    Attendance: Attended  Participation: Active verbal participation    Affect/Mood Range: Normal range  Affect/Mood Display: CWC - Congruent w/Content  Cognition: Alert and Oriented    Evidence of imminent suicide risk: No   Evidence of imminent homicide risk: No     Therapeutic Interventions: Emotion clarification and Supportive psychotherapy  Progress Toward Treatment Goal: Moderate improvement; pt. Was tearful as she spoke about some of her disappointments with  her granddaughter and the group was highly supportive.

## 2023-07-26 NOTE — GROUP NOTE
Group Appointment Information    Date: 07/26/23   Attendance Duration: 60 minutes  Number of Participants: 5 participants  Program / Group: IOP - Intensive Outpatient Program  Topics Covered: Commitment to change      Group Therapy Start Time: 10:00 AM    Attendance: Attended  Participation: Active verbal participation    Affect/Mood Range: Normal range  Affect/Mood Display: CWC - Congruent w/Content  Cognition: Alert and Oriented    Evidence of imminent suicide risk: No   Evidence of imminent homicide risk: No     Therapeutic Interventions: Emotion clarification and Supportive psychotherapy  Progress Toward Treatment Goal: Moderate improvement; pt. Shared out her authentic feelings about how her daughter speaks down to her.

## 2023-07-26 NOTE — GROUP NOTE
Group Appointment Information    Date: 07/26/23   Attendance Duration: 60 minutes  Number of Participants: 5 participants  Program / Group: IOP - Intensive Outpatient Program  Topics Covered: Anger      Group Therapy Start Time:  9:00 AM    Attendance: Attended  Participation: Active verbal participation    Affect/Mood Range: Normal range  Affect/Mood Display: CWC - Congruent w/Content  Cognition: Alert and Oriented    Evidence of imminent suicide risk: No   Evidence of imminent homicide risk: No     Therapeutic Interventions: Psychoeducation and Cognitive clarification  Progress Toward Treatment Goal: Mild improvement; pt. Shared how dismissed she feels sometimes when her daughter minimizes how she is feeling.

## 2023-07-28 ENCOUNTER — HOSPITAL ENCOUNTER (OUTPATIENT)
Dept: BEHAVIORAL HEALTH | Facility: MEDICAL CENTER | Age: 73
End: 2023-07-28
Attending: PSYCHIATRY & NEUROLOGY
Payer: MEDICARE

## 2023-07-28 DIAGNOSIS — F33.1 MODERATE EPISODE OF RECURRENT MAJOR DEPRESSIVE DISORDER (HCC): ICD-10-CM

## 2023-07-28 PROCEDURE — 90853 GROUP PSYCHOTHERAPY: CPT | Performed by: MARRIAGE & FAMILY THERAPIST

## 2023-07-28 PROCEDURE — 90834 PSYTX W PT 45 MINUTES: CPT | Mod: XU | Performed by: MARRIAGE & FAMILY THERAPIST

## 2023-07-28 NOTE — GROUP NOTE
Group Appointment Information    Date: 07/28/23   Attendance Duration: 60 minutes  Number of Participants: 5 participants  Program / Group: IOP - Intensive Outpatient Program  Topics Covered: Other (Comment):Boundaries      Group Therapy Start Time: 10:00 AM    Attendance: Attended  Participation: Active verbal participation    Affect/Mood Range: Normal range  Affect/Mood Display: CWC - Congruent w/Content  Cognition: Alert and Oriented    Evidence of imminent suicide risk: No   Evidence of imminent homicide risk: No     Therapeutic Interventions: Emotion clarification and Supportive psychotherapy  Progress Toward Treatment Goal: Moderate improvement; pt. Was very tuned in to the group and offered support ans also shared on her own experiences.

## 2023-07-28 NOTE — GROUP NOTE
Group Appointment Information    Date: 07/28/23   Attendance Duration: 45 minutes  Number of Participants: 5 participants  Program / Group: IOP - Intensive Outpatient Program  Topics Covered: Other (Comment):Process emotions.       Group Therapy Start Time: 11:00 AM    Attendance: Attended  Participation: Active verbal participation    Affect/Mood Range: Normal range  Affect/Mood Display: CWC - Congruent w/Content  Cognition: Alert and Oriented    Evidence of imminent suicide risk: No   Evidence of imminent homicide risk: No     Therapeutic Interventions: Emotion clarification and Supportive psychotherapy  Progress Toward Treatment Goal: Moderate improvement; continued to process emotions.
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
## 2023-07-28 NOTE — GROUP NOTE
Group Appointment Information    Date: 07/28/23   Attendance Duration: 60 minutes  Number of Participants: 4 participants  Program / Group: IOP - Intensive Outpatient Program  Topics Covered: Care in Relationships      Group Therapy Start Time:  9:00 AM    Attendance: Attended  Participation: Limited verbal participation    Affect/Mood Range: Constricted  Affect/Mood Display: CWC - Congruent w/Content  Cognition: Alert and Oriented    Evidence of imminent suicide risk: No   Evidence of imminent homicide risk: No     Therapeutic Interventions: Psychoeducation and Cognitive clarification  Progress Toward Treatment Goal: Mild improvement; pt. Was attentive and did participate when asked to.

## 2023-07-31 ENCOUNTER — HOSPITAL ENCOUNTER (OUTPATIENT)
Dept: BEHAVIORAL HEALTH | Facility: MEDICAL CENTER | Age: 73
End: 2023-07-31
Attending: PSYCHIATRY & NEUROLOGY
Payer: MEDICARE

## 2023-07-31 DIAGNOSIS — F33.1 MAJOR DEPRESSIVE DISORDER, RECURRENT EPISODE, MODERATE (HCC): ICD-10-CM

## 2023-07-31 PROCEDURE — 90853 GROUP PSYCHOTHERAPY: CPT | Performed by: MARRIAGE & FAMILY THERAPIST

## 2023-07-31 NOTE — GROUP NOTE
Group Appointment Information    Date: 07/31/23   Attendance Duration: 60 minutes  Number of Participants: 5 participants  Program / Group: IOP - Intensive Outpatient Program  Topics Covered: Values based action      Group Therapy Start Time:  9:00 AM    Attendance: Attended  Participation: Attentive    Affect/Mood Range: Normal range  Affect/Mood Display: CWC - Congruent w/Content  Cognition: Alert and Oriented    Evidence of imminent suicide risk: No   Evidence of imminent homicide risk: No     Therapeutic Interventions: Psychoeducation and Cognitive Modification  Progress Toward Treatment Goal: Mild improvement; pt was on time and actively participated

## 2023-07-31 NOTE — GROUP NOTE
Group Appointment Information    Date: 07/31/23   Attendance Duration: 45 minutes  Number of Participants: 7 participants  Program / Group: IOP - Intensive Outpatient Program  Topics Covered: Values based action      Group Therapy Start Time: 11:00 AM    Attendance: Attended  Participation: Active verbal participation    Affect/Mood Range: Normal range  Affect/Mood Display: CWC - Congruent w/Content  Cognition: Alert and Oriented    Evidence of imminent suicide risk: No   Evidence of imminent homicide risk: No     Therapeutic Interventions: Emotion clarification and Supportive psychotherapy  Progress Toward Treatment Goal: Moderate improvement; pt continued to process emotions and share out.

## 2023-07-31 NOTE — GROUP NOTE
Group Appointment Information    Date: 07/31/23   Attendance Duration: 60 minutes  Number of Participants: 7 participants  Program / Group: IOP - Intensive Outpatient Program  Topics Covered: Values based action      Group Therapy Start Time: 10:00 AM    Attendance: Attended  Participation: Active verbal participation    Affect/Mood Range: Normal range  Affect/Mood Display: CWC - Congruent w/Content  Cognition: Alert and Oriented    Evidence of imminent suicide risk: No   Evidence of imminent homicide risk: No     Therapeutic Interventions: Emotion clarification and Supportive psychotherapy  Progress Toward Treatment Goal: Mild improvement; pt. Said she learned much about herself and what she values.  She is concerned with building relationships and it dawned on her that in order to be in a relationship she would have to ask herself to give more.

## 2023-08-02 ENCOUNTER — HOSPITAL ENCOUNTER (OUTPATIENT)
Dept: BEHAVIORAL HEALTH | Facility: MEDICAL CENTER | Age: 73
End: 2023-08-02
Attending: PSYCHIATRY & NEUROLOGY
Payer: MEDICARE

## 2023-08-02 DIAGNOSIS — F33.2 SEVERE EPISODE OF RECURRENT MAJOR DEPRESSIVE DISORDER, WITHOUT PSYCHOTIC FEATURES (HCC): ICD-10-CM

## 2023-08-02 DIAGNOSIS — F33.2 SEVERE RECURRENT MAJOR DEPRESSION WITHOUT PSYCHOTIC FEATURES (HCC): ICD-10-CM

## 2023-08-02 PROCEDURE — 90853 GROUP PSYCHOTHERAPY: CPT | Performed by: MARRIAGE & FAMILY THERAPIST

## 2023-08-02 PROCEDURE — 90832 PSYTX W PT 30 MINUTES: CPT | Mod: XU | Performed by: MARRIAGE & FAMILY THERAPIST

## 2023-08-02 NOTE — GROUP NOTE
Group Appointment Information    Date: 08/02/23   Attendance Duration: 60 minutes  Number of Participants: 7 participants  Program / Group: IOP - Intensive Outpatient Program  Topics Covered: Relationships in Recovery      Group Therapy Start Time: 10:00 AM    Attendance: Attended  Participation: Active verbal participation    Affect/Mood Range: Normal range  Affect/Mood Display: CWC - Congruent w/Content  Cognition: Alert and Oriented    Evidence of imminent suicide risk: No   Evidence of imminent homicide risk: No     Therapeutic Interventions: Emotion clarification and Supportive psychotherapy  Progress Toward Treatment Goal: Moderate improvement; pt. Has been open and transparent in the group and today she shared out on how she might communicate what she is feeling with her daughter who reportedly sometimes talks down to patient.

## 2023-08-02 NOTE — GROUP NOTE
Group Appointment Information    Date: 08/02/23   Attendance Duration: 45 minutes  Number of Participants: 7 participants  Program / Group: IOP - Intensive Outpatient Program  Topics Covered: Care in Relationships      Group Therapy Start Time: 11:00 AM    Attendance: Attended  Participation: Active verbal participation    Affect/Mood Range: Normal range  Affect/Mood Display: CWC - Congruent w/Content  Cognition: Alert and Oriented    Evidence of imminent suicide risk: No   Evidence of imminent homicide risk: No     Therapeutic Interventions: Emotion clarification and Supportive psychotherapy  Progress Toward Treatment Goal: Moderate improvement; pt. Continued to process emotions.

## 2023-08-02 NOTE — GROUP NOTE
Group Appointment Information    Date: 08/02/23   Attendance Duration: 60 minutes  Number of Participants: 7 participants  Program / Group: IOP - Intensive Outpatient Program  Topics Covered: Care in Relationships      Group Therapy Start Time:  9:00 AM    Attendance: Attended  Participation: Active verbal participation    Affect/Mood Range: Normal range  Affect/Mood Display: CWC - Congruent w/Content  Cognition: Alert and Oriented    Evidence of imminent suicide risk: No   Evidence of imminent homicide risk: No     Therapeutic Interventions: Psychoeducation and Cognitive clarification  Progress Toward Treatment Goal: Mild improvement; pt. Is supportive of fellow group members and communicates well with others.

## 2023-08-03 NOTE — PROGRESS NOTES
" [facility]  Therapy Progress Note    Patient Name: Kristen Carrera  Patient MRN: [id number]  Today's Date: 8/2/2023     Type of session:Individual psychotherapy  Length of session: 30 minutes  Persons in attendance:Patient    Subjective/New Info: Pt. Shared that she has a good relationship with her grandsons and that while she had a good relationship with her daughter when she was younger, her daughter is terse and harsh with patient in the way she communicates with her.  Patient appeared to feel deflated.  Sh has determined to try and use assertive communication skills practiced in group to try and let her daughter know how what she is doing is affecting patient and she will also let her know what she would like her daughter to do differently when communication with her.  She hopes in this way to create a better sense of connection as currently she feels \"disconnected.\"     Objective/Observations:   Participation: Active verbal participation   Grooming: Casual   Cognition: Alert and Fully Oriented   Eye contact: Good   Mood: Depressed   Affect: Constricted   Thought process: Logical and Goal-directed   Speech: Rate within normal limits and Volume within normal limits   Other:     Diagnoses:   1. Severe episode of recurrent major depressive disorder, without psychotic features (HCC)         Current risk:   SUICIDE: Low   Homicide: Low   Self-harm: Low   Relapse: Low   Other:    Safety Plan reviewed? Not Indicated   If evidence of imminent risk is present, intervention/plan:     Therapeutic Intervention(s): Interpersonal effectiveness skills, Stressors assessed, and Supportive psychotherapy    Treatment Goal(s)/Objective(s) addressed: Pt. Will practice assertive communication skills      Progress toward Treatment Goals: Moderate improvement    Plan:  - Continue Intensive Outpatient Program    BOBBY Gamez  8/2/2023                                  "

## 2023-08-04 ENCOUNTER — HOSPITAL ENCOUNTER (OUTPATIENT)
Dept: BEHAVIORAL HEALTH | Facility: MEDICAL CENTER | Age: 73
End: 2023-08-04
Attending: PSYCHIATRY & NEUROLOGY
Payer: MEDICARE

## 2023-08-04 DIAGNOSIS — F33.2 SEVERE RECURRENT MAJOR DEPRESSION WITHOUT PSYCHOTIC FEATURES (HCC): ICD-10-CM

## 2023-08-04 PROCEDURE — 90853 GROUP PSYCHOTHERAPY: CPT | Performed by: MARRIAGE & FAMILY THERAPIST

## 2023-08-04 NOTE — GROUP NOTE
Group Appointment Information    Date: 08/04/23   Attendance Duration: 60 minutes  Number of Participants: 5 participants  Program / Group: IOP - Intensive Outpatient Program  Topics Covered: Codependency      Group Therapy Start Time: 10:00 AM    Attendance: Attended  Participation: Active verbal participation    Affect/Mood Range: Normal range  Affect/Mood Display: CWC - Congruent w/Content  Cognition: Alert and Oriented    Evidence of imminent suicide risk: No   Evidence of imminent homicide risk: No     Therapeutic Interventions: Emotion clarification and Supportive psychotherapy  Progress Toward Treatment Goal: Moderate improvement; pt. Shared out that she does identify as being a people pleaser.

## 2023-08-04 NOTE — GROUP NOTE
Group Appointment Information    Date: 08/04/23   Attendance Duration: 45 minutes  Number of Participants: 5 participants  Program / Group: IOP - Intensive Outpatient Program  Topics Covered: Codependency      Group Therapy Start Time: 11:00 AM    Attendance: Attended  Participation: Active verbal participation    Affect/Mood Range: Normal range  Affect/Mood Display: CWC - Congruent w/Content  Cognition: Alert and Oriented    Evidence of imminent suicide risk: No   Evidence of imminent homicide risk: No     Therapeutic Interventions: Emotion clarification and Supportive psychotherapy  Progress Toward Treatment Goal: Mild improvement; pt. Continued to process emotions and share out.

## 2023-08-04 NOTE — GROUP NOTE
Group Appointment Information    Date: 08/04/23   Attendance Duration: 60 minutes  Number of Participants: 6 participants  Program / Group: IOP - Intensive Outpatient Program  Topics Covered: Codependency      Group Therapy Start Time:  9:00 AM    Attendance: Attended  Participation: Active verbal participation    Affect/Mood Range: Normal range  Affect/Mood Display: CWC - Congruent w/Content  Cognition: Alert and Oriented    Evidence of imminent suicide risk: No   Evidence of imminent homicide risk: No     Therapeutic Interventions: Psychoeducation and Cognitive clarification  Progress Toward Treatment Goal: Mild improvement; pt. Is willing to share out and be vulnerable.  She has been making it close to being on time to group more consistently.  She appears to be engaged.

## 2023-08-07 ENCOUNTER — APPOINTMENT (OUTPATIENT)
Dept: BEHAVIORAL HEALTH | Facility: MEDICAL CENTER | Age: 73
End: 2023-08-07
Attending: PSYCHIATRY & NEUROLOGY
Payer: MEDICARE

## 2023-08-09 ENCOUNTER — APPOINTMENT (OUTPATIENT)
Dept: BEHAVIORAL HEALTH | Facility: MEDICAL CENTER | Age: 73
End: 2023-08-09
Attending: PSYCHIATRY & NEUROLOGY
Payer: MEDICARE

## 2023-08-09 ENCOUNTER — HOSPITAL ENCOUNTER (OUTPATIENT)
Dept: RADIOLOGY | Facility: MEDICAL CENTER | Age: 73
End: 2023-08-09
Attending: PSYCHIATRY & NEUROLOGY
Payer: MEDICARE

## 2023-08-09 DIAGNOSIS — R41.3 COMPLAINTS OF MEMORY DISTURBANCE: ICD-10-CM

## 2023-08-09 PROCEDURE — 70551 MRI BRAIN STEM W/O DYE: CPT

## 2023-08-16 ENCOUNTER — HOSPITAL ENCOUNTER (OUTPATIENT)
Dept: BEHAVIORAL HEALTH | Facility: MEDICAL CENTER | Age: 73
End: 2023-08-16
Attending: PSYCHIATRY & NEUROLOGY
Payer: MEDICARE

## 2023-08-16 DIAGNOSIS — F33.2 SEVERE RECURRENT MAJOR DEPRESSION WITHOUT PSYCHOTIC FEATURES (HCC): ICD-10-CM

## 2023-08-16 PROCEDURE — 90853 GROUP PSYCHOTHERAPY: CPT | Performed by: MARRIAGE & FAMILY THERAPIST

## 2023-08-16 NOTE — GROUP NOTE
Group Appointment Information    Date: 08/16/23   Attendance Duration: 45 minutes  Number of Participants: 8 participants  Program / Group: IOP - Intensive Outpatient Program  Topics Covered: Values based action      Group Therapy Start Time: 11:00 AM    Attendance: Attended  Participation: Active verbal participation    Affect/Mood Range: Normal range  Affect/Mood Display: CWC - Congruent w/Content  Cognition: Alert and Oriented    Evidence of imminent suicide risk: No   Evidence of imminent homicide risk: No     Therapeutic Interventions: Emotion clarification and Supportive psychotherapy  Progress Toward Treatment Goal: Mild improvement; pt. Continued to process her emotions.

## 2023-08-16 NOTE — GROUP NOTE
Group Appointment Information    Date: 08/16/23   Attendance Duration: 690 minutes  Number of Participants: 8 participants  Program / Group: IOP - Intensive Outpatient Program  Topics Covered: ACT concept intro      Group Therapy Start Time: 10:00 AM    Attendance: Attended  Participation: Active verbal participation    Affect/Mood Range: Labile  Affect/Mood Display: CWC - Congruent w/Content  Cognition: Alert and Oriented    Evidence of imminent suicide risk: No   Evidence of imminent homicide risk: No     Therapeutic Interventions: Emotion clarification and Supportive psychotherapy  Progress Toward Treatment Goal: Mild improvement; pt.is very verbal and sad over something that happened with her daughter.

## 2023-08-16 NOTE — GROUP NOTE
Group Appointment Information    Date: 08/16/23   Attendance Duration: 60 minutes  Number of Participants: 9 participants  Program / Group: IOP - Intensive Outpatient Program  Topics Covered: Mindfulness      Group Therapy Start Time:  9:00 AM    Attendance: Attended  Participation: Active verbal participation    Affect/Mood Range: Normal range  Affect/Mood Display: CWC - Congruent w/Content  Cognition: Alert and Oriented    Evidence of imminent suicide risk: No   Evidence of imminent homicide risk: No     Therapeutic Interventions: Psychoeducation and Cognitive clarification  Progress Toward Treatment Goal: Mild improvement; pt. Is outspoken  while being considerate of her fellow group members.

## 2023-08-18 ENCOUNTER — HOSPITAL ENCOUNTER (OUTPATIENT)
Dept: BEHAVIORAL HEALTH | Facility: MEDICAL CENTER | Age: 73
End: 2023-08-18
Attending: PSYCHIATRY & NEUROLOGY
Payer: MEDICARE

## 2023-08-18 DIAGNOSIS — F33.1 MODERATE EPISODE OF RECURRENT MAJOR DEPRESSIVE DISORDER (HCC): ICD-10-CM

## 2023-08-18 PROCEDURE — 90853 GROUP PSYCHOTHERAPY: CPT | Performed by: MARRIAGE & FAMILY THERAPIST

## 2023-08-18 NOTE — GROUP NOTE
Group Appointment Information    Date: 08/18/23   Attendance Duration: 45 minutes  Number of Participants: 11 participants  Program / Group: IOP - Intensive Outpatient Program  Topics Covered: Other (Comment):Assertive communication      Group Therapy Start Time: 11:00 AM    Attendance: Attended  Participation: Active verbal participation    Affect/Mood Range: Normal range  Affect/Mood Display: CWC - Congruent w/Content  Cognition: Alert and Oriented    Evidence of imminent suicide risk: No   Evidence of imminent homicide risk: No     Therapeutic Interventions: Emotion clarification and Supportive psychotherapy  Progress Toward Treatment Goal: Significant improvement; pt. Appears to connect well with others and while she jokes about not caring for people, she is a very caring person and is very supportive.

## 2023-08-18 NOTE — GROUP NOTE
Group Appointment Information    Date: 08/18/23   Attendance Duration: 60 minutes  Number of Participants: 11 participants  Program / Group: IOP - Intensive Outpatient Program  Topics Covered: Regulating emotions      Group Therapy Start Time:  9:00 AM    Attendance: Attended  Participation: Active verbal participation    Affect/Mood Range: Normal range  Affect/Mood Display: CWC - Congruent w/Content  Cognition: Alert and Oriented    Evidence of imminent suicide risk: No   Evidence of imminent homicide risk: No     Therapeutic Interventions: Psychoeducation and Cognitive clarification  Progress Toward Treatment Goal: Moderate improvement; pt. Is active and involved in her recovery.

## 2023-08-18 NOTE — GROUP NOTE
Group Appointment Information    Date: 08/18/23   Attendance Duration: 60 minutes  Number of Participants: 10 participants  Program / Group: IOP - Intensive Outpatient Program  Topics Covered: Regulating emotions      Group Therapy Start Time: 10:00 AM    Attendance: Attended  Participation: Active verbal participation    Affect/Mood Range: Normal range  Affect/Mood Display: CWC - Congruent w/Content  Cognition: Alert and Oriented    Evidence of imminent suicide risk: No   Evidence of imminent homicide risk: No     Therapeutic Interventions: Emotion clarification and Supportive psychotherapy  Progress Toward Treatment Goal: Moderate improvement; pt. Appears to be taking more care of herself and is very supportive of fellow group members.

## 2023-08-21 ENCOUNTER — HOSPITAL ENCOUNTER (OUTPATIENT)
Dept: BEHAVIORAL HEALTH | Facility: MEDICAL CENTER | Age: 73
End: 2023-08-21
Attending: PSYCHIATRY & NEUROLOGY
Payer: MEDICARE

## 2023-08-21 DIAGNOSIS — F33.1 MODERATE EPISODE OF RECURRENT MAJOR DEPRESSIVE DISORDER (HCC): ICD-10-CM

## 2023-08-21 DIAGNOSIS — F33.2 SEVERE RECURRENT MAJOR DEPRESSION WITHOUT PSYCHOTIC FEATURES (HCC): ICD-10-CM

## 2023-08-21 PROCEDURE — 90834 PSYTX W PT 45 MINUTES: CPT | Performed by: MARRIAGE & FAMILY THERAPIST

## 2023-08-21 PROCEDURE — 90853 GROUP PSYCHOTHERAPY: CPT | Performed by: MARRIAGE & FAMILY THERAPIST

## 2023-08-21 NOTE — PROGRESS NOTES
" [facility]  Therapy Progress Note    Patient Name: Kristen Carrera  Patient MRN: [id number]  Today's Date: 8/21/2023     Type of session:Individual psychotherapy  Length of session: 45 minutes  Persons in attendance:Patient    Subjective/New Info: Pt. Reports she is interested in working one hour on cleaning her house and one hour on creative arts endeavors as a part of her [facility] routine. She endorses hyper somnolence, so having daily routines are important to her continued health.   She would like to have fidelity with this plan.  We processed how her day usually unfolds and discussed medication and sleep schedule.  Pt. Has a difficult relationship with her adult daughter who is \"dismissive\" of patient.  \"I feel disconnected when my daughter dismisses me and I would like her to treat me with more kindness.\"      Objective/Observations:   Participation: Active verbal participation   Grooming: Casual   Cognition: Alert   Eye contact: Good   Mood: Euthymic   Affect: Flexible   Thought process: Logical   Speech: Rate within normal limits and Volume within normal limits   Other:     Diagnoses:   1. Moderate episode of recurrent major depressive disorder (HCC)         Current risk:   SUICIDE: Low   Homicide: Low   Self-harm: Low   Relapse: Low   Other:    Safety Plan reviewed? No   If evidence of imminent risk is present, intervention/plan:     Therapeutic Intervention(s): Stressors assessed and Supportive psychotherapy    Treatment Goal(s)/Objective(s) addressed: Pt. will benefit from incepting a routine to her day to help her  be more awake throughout the day and practice more assertive communication with her adult daughter.      Progress toward Treatment Goals: Mild improvement    Plan:  - Continue Intensive Outpatient Program    BOBBY Gamez  8/21/2023                                  "

## 2023-08-21 NOTE — GROUP NOTE
Group Appointment Information    Date: 08/21/23   Attendance Duration: 45 minutes  Number of Participants: 9 participants  Program / Group: IOP - Intensive Outpatient Program  Topics Covered: Care in Relationships      Group Therapy Start Time: 11:00 AM    Attendance: Attended  Participation: Active verbal participation    Affect/Mood Range: Normal range  Affect/Mood Display: CWC - Congruent w/Content  Cognition: Alert and Oriented    Evidence of imminent suicide risk: No   Evidence of imminent homicide risk: No     Therapeutic Interventions: Emotion clarification and Supportive psychotherapy  Progress Toward Treatment Goal: Moderate improvement; pt. Continued to process feelings within the group and was well-supported.

## 2023-08-21 NOTE — GROUP NOTE
Group Appointment Information    Date: 08/21/23   Attendance Duration: 60 minutes  Number of Participants: 9 participants  Program / Group: IOP - Intensive Outpatient Program  Topics Covered: Relationships in Recovery      Group Therapy Start Time: 10:00 AM    Attendance: Attended  Participation: Active verbal participation    Affect/Mood Range: Normal range  Affect/Mood Display: CWC - Congruent w/Content  Cognition: Alert and Oriented    Evidence of imminent suicide risk: No   Evidence of imminent homicide risk: No     Therapeutic Interventions: Emotion clarification and Supportive psychotherapy  Progress Toward Treatment Goal: Moderate improvement; pt. Shared out and was well supported by the group.

## 2023-08-21 NOTE — GROUP NOTE
Group Appointment Information    Date: 08/21/23   Attendance Duration: 60 minutes  Number of Participants: 10 participants  Program / Group: IOP - Intensive Outpatient Program  Topics Covered: Healing Family within      Group Therapy Start Time:  9:00 AM    Attendance: Attended  Participation: Active verbal participation    Affect/Mood Range: Normal range  Affect/Mood Display: CWC - Congruent w/Content  Cognition: Alert and Oriented    Evidence of imminent suicide risk: No   Evidence of imminent homicide risk: No     Therapeutic Interventions: Emotion clarification and Supportive psychotherapy  Progress Toward Treatment Goal: Mild improvement; pt. Shared out and was supportive of fellow group members.

## 2023-08-22 ENCOUNTER — TELEPHONE (OUTPATIENT)
Dept: SLEEP MEDICINE | Facility: MEDICAL CENTER | Age: 73
End: 2023-08-22
Payer: MEDICARE

## 2023-08-22 NOTE — TELEPHONE ENCOUNTER
Patient called and said she has not heard from the DME. I told her that I will refax the Order and to let us know if there is any other Issues

## 2023-08-23 ENCOUNTER — HOSPITAL ENCOUNTER (OUTPATIENT)
Dept: BEHAVIORAL HEALTH | Facility: MEDICAL CENTER | Age: 73
End: 2023-08-23
Attending: PSYCHIATRY & NEUROLOGY
Payer: MEDICARE

## 2023-08-23 DIAGNOSIS — F33.2 SEVERE RECURRENT MAJOR DEPRESSION WITHOUT PSYCHOTIC FEATURES (HCC): ICD-10-CM

## 2023-08-23 PROCEDURE — 90853 GROUP PSYCHOTHERAPY: CPT | Performed by: MARRIAGE & FAMILY THERAPIST

## 2023-08-23 NOTE — GROUP NOTE
"Group Appointment Information    Date: 08/23/23   Attendance Duration: 60 minutes  Number of Participants: 9 participants  Program / Group: IOP - Intensive Outpatient Program  Topics Covered: Care in Relationships      Group Therapy Start Time: 10:00 AM    Attendance: Attended  Participation: Active verbal participation    Affect/Mood Range: Normal range  Affect/Mood Display: CWC - Congruent w/Content  Cognition: Alert and Oriented    Evidence of imminent suicide risk: No   Evidence of imminent homicide risk: No     Therapeutic Interventions: Emotion clarification and Supportive psychotherapy  Progress Toward Treatment Goal: Mild improvement; pt. Shared out about her concern that she is \"hoarding\" and shared how stuck she feels.   "

## 2023-08-23 NOTE — GROUP NOTE
Group Appointment Information    Date: 08/23/23   Attendance Duration: 60 minutes  Number of Participants: 9 participants  Program / Group: IOP - Intensive Outpatient Program  Topics Covered: Relationships in Recovery      Group Therapy Start Time:  9:00 AM    Attendance: Attended  Participation: Active verbal participation    Affect/Mood Range: Normal range  Affect/Mood Display: CWC - Congruent w/Content  Cognition: Alert and Oriented    Evidence of imminent suicide risk: No   Evidence of imminent homicide risk: No     Therapeutic Interventions: Cognitive clarification  Progress Toward Treatment Goal: Mild improvement

## 2023-08-23 NOTE — GROUP NOTE
Group Appointment Information    Date: 08/23/23   Attendance Duration: 45 minutes  Number of Participants: 9 participants  Program / Group: IOP - Intensive Outpatient Program  Topics Covered: Other (Comment): Process emotions      Group Therapy Start Time: 11:00 AM    Attendance: Attended  Participation: Active verbal participation    Affect/Mood Range: Normal range  Affect/Mood Display: CWC - Congruent w/Content  Cognition: Alert and Oriented    Evidence of imminent suicide risk: No   Evidence of imminent homicide risk: No     Therapeutic Interventions: Emotion clarification and Supportive psychotherapy  Progress Toward Treatment Goal: Mild improvement; pt. Continued to process emotions.

## 2023-08-25 ENCOUNTER — HOSPITAL ENCOUNTER (OUTPATIENT)
Dept: BEHAVIORAL HEALTH | Facility: MEDICAL CENTER | Age: 73
End: 2023-08-25
Attending: PSYCHIATRY & NEUROLOGY
Payer: MEDICARE

## 2023-08-25 DIAGNOSIS — F33.1 MODERATE EPISODE OF RECURRENT MAJOR DEPRESSIVE DISORDER (HCC): ICD-10-CM

## 2023-08-25 PROCEDURE — 90846 FAMILY PSYTX W/O PT 50 MIN: CPT | Performed by: MARRIAGE & FAMILY THERAPIST

## 2023-08-25 NOTE — GROUP NOTE
Group Appointment Information    Date: 08/25/23   Attendance Duration: 45 minutes  Number of Participants: 7 participants  Program / Group: IOP - Intensive Outpatient Program  Topics Covered: Stress Management      Group Therapy Start Time: 11:00 AM    Attendance: Attended  Participation: Active verbal participation    Affect/Mood Range: Normal range  Affect/Mood Display: CWC - Congruent w/Content  Cognition: Alert and Oriented    Evidence of imminent suicide risk: No   Evidence of imminent homicide risk: No     Therapeutic Interventions: Emotion clarification and Supportive psychotherapy  Progress Toward Treatment Goal: Moderate improvement; pt. Continued to process emotions.

## 2023-08-25 NOTE — GROUP NOTE
Group Appointment Information    Date: 08/25/23   Attendance Duration: 60 minutes  Number of Participants: 7 participants  Program / Group: IOP - Intensive Outpatient Program  Topics Covered: Stress Management      Group Therapy Start Time: 10:00 AM    Attendance: Attended  Participation: Active verbal participation    Affect/Mood Range: Normal range  Affect/Mood Display: CWC - Congruent w/Content  Cognition: Alert and Oriented    Evidence of imminent suicide risk: No   Evidence of imminent homicide risk: No     Therapeutic Interventions: Emotion clarification and Supportive psychotherapy  Progress Toward Treatment Goal: Mild improvement; pt. Shared out about plans for her daughter's birthday and results of a previous brain scan.  She relates well to others and is interactive and supportive.

## 2023-08-25 NOTE — GROUP NOTE
Group Appointment Information    Date: 08/25/23   Attendance Duration: 60 minutes  Number of Participants: 8 participants  Program / Group: IOP - Intensive Outpatient Program  Topics Covered: Anxiety Mgmt      Group Therapy Start Time:  9:00 AM    Attendance: Attended  Participation: Active verbal participation    Affect/Mood Range: Normal range  Affect/Mood Display: CWC - Congruent w/Content  Cognition: Alert and Oriented    Evidence of imminent suicide risk: No   Evidence of imminent homicide risk: No     Therapeutic Interventions: Emotion clarification and Supportive psychotherapy  Progress Toward Treatment Goal: Moderate improvement

## 2023-08-28 ENCOUNTER — HOSPITAL ENCOUNTER (OUTPATIENT)
Dept: BEHAVIORAL HEALTH | Facility: MEDICAL CENTER | Age: 73
End: 2023-08-28
Attending: PSYCHIATRY & NEUROLOGY
Payer: MEDICARE

## 2023-08-28 ENCOUNTER — TELEPHONE (OUTPATIENT)
Dept: MEDICAL GROUP | Facility: MEDICAL CENTER | Age: 73
End: 2023-08-28
Payer: MEDICARE

## 2023-08-28 DIAGNOSIS — E78.5 HYPERLIPIDEMIA, UNSPECIFIED HYPERLIPIDEMIA TYPE: ICD-10-CM

## 2023-08-28 DIAGNOSIS — F33.1 MAJOR DEPRESSIVE DISORDER, RECURRENT EPISODE, MODERATE (HCC): ICD-10-CM

## 2023-08-28 PROCEDURE — 90853 GROUP PSYCHOTHERAPY: CPT | Performed by: MARRIAGE & FAMILY THERAPIST

## 2023-08-28 RX ORDER — ROSUVASTATIN CALCIUM 20 MG/1
20 TABLET, COATED ORAL EVERY EVENING
Qty: 100 TABLET | Refills: 1 | Status: SHIPPED | OUTPATIENT
Start: 2023-08-28 | End: 2023-12-06

## 2023-08-28 RX ORDER — ASPIRIN 81 MG/1
81 TABLET ORAL DAILY
Qty: 100 TABLET | Refills: 1 | Status: SHIPPED | OUTPATIENT
Start: 2023-08-28 | End: 2024-03-12 | Stop reason: SDUPTHER

## 2023-08-28 NOTE — GROUP NOTE
Group Appointment Information    Date: 08/28/23   Attendance Duration: 60 minutes  Number of Participants: 6 participants  Program / Group: IOP - Intensive Outpatient Program  Topics Covered: Regulating emotions      Group Therapy Start Time: 10:00 AM    Attendance: Attended  Participation: Active verbal participation    Affect/Mood Range: Normal range  Affect/Mood Display: CWC - Congruent w/Content  Cognition: Alert and Oriented    Evidence of imminent suicide risk: No   Evidence of imminent homicide risk: No     Therapeutic Interventions: Emotion clarification and Supportive psychotherapy  Progress Toward Treatment Goal: Moderate improvement; pt. Appears to be experiencing some positive improvements in relationships with several family members per her report.

## 2023-08-28 NOTE — GROUP NOTE
Group Appointment Information    Date: 08/28/23   Attendance Duration: 60 minutes  Number of Participants: 6 participants  Program / Group: IOP - Intensive Outpatient Program  Topics Covered: Mindfulness      Group Therapy Start Time:  9:00 AM    Attendance: Attended  Participation: Active verbal participation    Affect/Mood Range: Normal range  Affect/Mood Display: CWC - Congruent w/Content  Cognition: Alert and Oriented    Evidence of imminent suicide risk: No   Evidence of imminent homicide risk: No     Therapeutic Interventions: Psychoeducation and Cognitive clarification  Progress Toward Treatment Goal: Moderate improvement; pt. Is attentive and demonstrates regard for others in her attention to the material being discussed and her contributions to the content are insightful.

## 2023-08-28 NOTE — GROUP NOTE
Group Appointment Information    Date: 08/28/23   Attendance Duration: 45 minutes  Number of Participants: 6 participants  Program / Group: IOP - Intensive Outpatient Program  Topics Covered: Regulating emotions      Group Therapy Start Time: 11:00 AM    Attendance: Attended  Participation: Active verbal participation    Affect/Mood Range: Normal range  Affect/Mood Display: CWC - Congruent w/Content  Cognition: Alert and Oriented    Evidence of imminent suicide risk: No   Evidence of imminent homicide risk: No     Therapeutic Interventions: Emotion clarification and Supportive psychotherapy  Progress Toward Treatment Goal: Moderate improvement; pt. Is communicating in more helpful ways with some of her family members.   She shares out in group and is well-supported.

## 2023-08-28 NOTE — TELEPHONE ENCOUNTER
Patient came into office to request a medication to be prescribed to her due to the results of her MR.Dr Li stated that he sent over a request to patients pcp but she is still out on leave and no one has called patient to follow up on this even if she's left multiple voicemails for her pcp. Please call patient to see if this is something we can do for her or if she needs an appointment.

## 2023-08-30 ENCOUNTER — APPOINTMENT (OUTPATIENT)
Dept: BEHAVIORAL HEALTH | Facility: MEDICAL CENTER | Age: 73
End: 2023-08-30
Attending: PSYCHIATRY & NEUROLOGY
Payer: MEDICARE

## 2023-09-01 ENCOUNTER — HOSPITAL ENCOUNTER (OUTPATIENT)
Dept: BEHAVIORAL HEALTH | Facility: MEDICAL CENTER | Age: 73
End: 2023-09-01
Attending: PSYCHIATRY & NEUROLOGY
Payer: MEDICARE

## 2023-09-01 DIAGNOSIS — F33.2 SEVERE RECURRENT MAJOR DEPRESSION WITHOUT PSYCHOTIC FEATURES (HCC): ICD-10-CM

## 2023-09-01 PROCEDURE — 90853 GROUP PSYCHOTHERAPY: CPT | Performed by: MARRIAGE & FAMILY THERAPIST

## 2023-09-01 PROCEDURE — 90837 PSYTX W PT 60 MINUTES: CPT | Mod: XU | Performed by: MARRIAGE & FAMILY THERAPIST

## 2023-09-01 NOTE — GROUP NOTE
"Group Appointment Information    Date: 09/01/23   Attendance Duration: 60 minutes  Number of Participants: 9 participants  Program / Group: IOP - Intensive Outpatient Program  Topics Covered: Other (Comment):process      Group Therapy Start Time: 10:00 AM    Attendance: Attended  Participation: Active verbal participation    Affect/Mood Range: Normal range  Affect/Mood Display: CWC - Congruent w/Content  Cognition: Alert    Evidence of imminent suicide risk: No   Evidence of imminent homicide risk: No     Therapeutic Interventions: Emotion clarification and Supportive psychotherapy  Progress Toward Treatment Goal: Significant improvement; pt. Was on time and ready to participate and alert.  She has had fidelity with program and while she continues to struggle with \"getting meaning in her life,\" she is willing and has put in the work of building her self-awareness and being vulnerable with othes n the group.   "

## 2023-09-01 NOTE — GROUP NOTE
Group Appointment Information    Date: 09/01/23   Attendance Duration: 45 minutes  Number of Participants: 9 participants  Program / Group: IOP - Intensive Outpatient Program  Topics Covered: Care in Relationships      Group Therapy Start Time: 11:00 AM    Attendance: Attended  Participation: Active verbal participation    Affect/Mood Range: Normal range  Affect/Mood Display: CWC - Congruent w/Content  Cognition: Alert and Oriented    Evidence of imminent suicide risk: No   Evidence of imminent homicide risk: No     Therapeutic Interventions: Emotion clarification and Supportive psychotherapy  Progress Toward Treatment Goal: Moderate improvement; pt. Continues to process feelings.

## 2023-09-01 NOTE — PROGRESS NOTES
" [facility]  Therapy Progress Note    Patient Name: Kristen Carrera  Patient MRN: [id number]  Today's Date: 9/1/2023     Type of session:Individual psychotherapy  Length of session: 60 minutes  Persons in attendance:Patient    Subjective/New Info: Pt. Reports to have good relationships with her grandsons.  She is working on her relationship with her daughter who is often terse with her.  Pt. Continues to experience hypersomnolence, and this is discouraging for her.  She has a plan moving forward to have  friend help her work on organizing her apartment.  She describes herself as a \"hoarder.\"  She has an idea to make tik tok videos and is exploring this idea and has someone in mind to ask for help from.      Objective/Observations:   Participation: Active verbal participation   Grooming: Casual   Cognition: Alert and Fully Oriented   Eye contact: Good   Mood: Euthymic   Affect: Congruent with content   Thought process: Logical and Goal-directed   Speech: Rate within normal limits and Volume within normal limits   Other:     Diagnoses:   1. Severe recurrent major depression without psychotic features (HCC)         Current risk:   SUICIDE: Low   Homicide: Low   Self-harm: Low   Relapse: Low   Other:    Safety Plan reviewed? Not Indicated   If evidence of imminent risk is present, intervention/plan:     Therapeutic Intervention(s): Distress tolerance skills, Goal-setting, Stressors assessed, and Supportive psychotherapy    Treatment Goal(s)/Objective(s) addressed: Pt. Will benefit from following through on her plans to organize her apartment and build relationships with others.       Progress toward Treatment Goals: Significant improvement    Plan:  - Termination of IOP    BOBBY Gamez  9/1/2023                                  "

## 2023-09-01 NOTE — GROUP NOTE
"Group Appointment Information    Date: 09/01/23   Attendance Duration: 60 minutes  Number of Participants: 9 participants  Program / Group: IOP - Intensive Outpatient Program  Topics Covered: Boundaries      Group Therapy Start Time:  9:00 AM    Attendance: Attended  Participation: Active verbal participation    Affect/Mood Range: Normal range  Affect/Mood Display: CWC - Congruent w/Content  Cognition: Alert and Oriented    Evidence of imminent suicide risk: No   Evidence of imminent homicide risk: No     Therapeutic Interventions: Psychoeducation and Cognitive clarification  Progress Toward Treatment Goal: Significant improvement; today is pt's final day in program.  She is still struggling with hoarding and finding purpose and meaning in her life.  However she evidences \"willingness\" and has worked on staying relevant and continuing to self-effiectuate.    "

## 2023-09-05 ENCOUNTER — OFFICE VISIT (OUTPATIENT)
Dept: BEHAVIORAL HEALTH | Facility: CLINIC | Age: 73
End: 2023-09-05
Payer: MEDICARE

## 2023-09-05 DIAGNOSIS — F33.1 MAJOR DEPRESSIVE DISORDER, RECURRENT EPISODE, MODERATE (HCC): ICD-10-CM

## 2023-09-05 PROCEDURE — 90834 PSYTX W PT 45 MINUTES: CPT | Performed by: PSYCHOLOGIST

## 2023-09-05 NOTE — PROGRESS NOTES
"[facility]   Therapy Progress Note    Name: Kristen Carrera  MRN: [id number]  : 1950  Age: 73 y.o.  Date of assessment: 2023  PCP: Binta To M.D.  Persons in attendance: Patient  Total session time: 38 minutes    Patient 15 minutes late to intake; did not complete paperwork and agreed to short session for introductions (not including typical intake questions).    Chart reviewed prior to session.     Confidentiality and limits reviewed; patient endorsed understanding and desire to continue.    Topics addressed in psychotherapy include: Kristne  is a 73 y.o. female referred for assessment by self / IOP. Primary presenting issue includes Depression hx.    Kristen used to do arts and crafts, and collects new things. She does waterpaints, DIY jewelry, chris, resin, wire. She has been in her apartment for 14 years. She has hypersomnia sleeping for 24 hours sometimes or more. She was at one point homeless and at a women's shelter, sleeping for about 30+ hours at one point at a women's shelter. She does not know why. She then wakes up tired. There are no underlying health issues that she knows of having followed a sleep study and medication. Her medication has fluctuated. Her recent MRI showed some small strokes and scarring in her white matter to her understanding.    In the IOP she addressed depression and issues with focus / concentration (worsening this past year). She formerly used speed (17 years) however \"I didn't get high from it, it just kept me awake so I could do things.\"    Kristen says some of her family thinks she should get rid of things; mostly she would like to organize her place because she cannot find things, like 3 quarts of mayonnaise.     \"I just get overwhelmed\" as she has too much to organize. In her last inspection it took weeks to prepare her kitchen. Also, walking across the room she gets distracted by all the other items that need attention. She also spends \"90% of my time " "in bed\" on games or on Netflix. She doesn't do much that she would like to do because of the phone addiction. \"I don't leave my apartment.\"    Kristen has a daughter, grandchildren, and great grandsons (age 9, Channing and a 5 year old younger brother, Neptali) who she has watched since they were babies. These are two of 5 \"drug babies\" with the others During COVID she did not get to see them for long.    Between sessions Kristen is going to put up shelves (there is a difficulty with maintaining focus on the task at hand). Her plan / goal is 1 hour per day of cleaning, setting a timer. Then she has an hour to do something artsy.    Objective Observations:   Participation:Active verbal participation, Attentive, Engaged, and Open to feedback   Grooming:Casual and Long pink/purple dark hair.   Cognition:Alert and Fully Oriented   Eye Contact:Good   Mood:Euthymic   Affect:Congruent with content   Thought Process:Tangential   Speech:Rate within normal limits and Volume within normal limits    Current Risk:   Suicide: low   Homicide: low   Self-Harm: low   Relapse: low   Safety Plan Reviewed: not applicable    Care Plan Updated: No    Does patient express agreement with the above plan? Yes     Diagnosis:  1. Major depressive disorder, recurrent episode, moderate (HCC)        Referral appointment(s) scheduled? Yes       Luigi Rueda, Ph.D.      "

## 2023-09-08 ENCOUNTER — TELEPHONE (OUTPATIENT)
Dept: MEDICAL GROUP | Facility: MEDICAL CENTER | Age: 73
End: 2023-09-08
Payer: MEDICARE

## 2023-09-08 NOTE — TELEPHONE ENCOUNTER
Why does the patient want to see gynecology? A diagnosis is required to place a referral, and depending on the issue, she may not need to see a gynecologist.

## 2023-09-12 ENCOUNTER — TELEPHONE (OUTPATIENT)
Dept: BEHAVIORAL HEALTH | Facility: MEDICAL CENTER | Age: 73
End: 2023-09-12
Payer: MEDICARE

## 2023-09-12 DIAGNOSIS — F33.1 MODERATE EPISODE OF RECURRENT MAJOR DEPRESSIVE DISORDER (HCC): ICD-10-CM

## 2023-09-12 NOTE — TELEPHONE ENCOUNTER
"[facility]  DISCHARGE SUMMARY FORM    HHPI / SCP: SCP/FABIENNE Other Ins.:      Patient Name: Kristen Carrera  Admission Date: 23  Level of Care Attended:  Intens.OP : 1950  Discharge Date: MRN: [id number]  1223       SIGNIFICANT FINDINGS/CLINICAL IMPRESSION:   DSM Codes:   IOP    ICD10 Codes:   1. Moderate episode of recurrent major depressive disorder (HCC)        Additional problems identified via assessment: none    Treatment Components in Which Patient Participated (check all that apply):  Education group(s), 1:1 teaching/therapy, Medication Management, and Group Therapy    Summary of Course of Treatment: Pt. Reported improved relationship with several family members as she continued in program; reduced symptoms of depression and hopelessness.     Condition at Time of Transfer/Discharge:  Pt. Reported improved relationships and less felt depression, and also that she continues to struggle with \"hoarding.\"      [] Medications Reviewed with Copy to Patient    Referred to: Refer to [facility]: Outpatient Therapy     Patient is in agreement with discharge plan: yes    ANN Gamez.   "

## 2023-09-13 DIAGNOSIS — F90.0 ATTENTION DEFICIT HYPERACTIVITY DISORDER (ADHD), PREDOMINANTLY INATTENTIVE TYPE: ICD-10-CM

## 2023-09-13 DIAGNOSIS — G47.09 OTHER INSOMNIA: ICD-10-CM

## 2023-09-13 RX ORDER — TRAZODONE HYDROCHLORIDE 100 MG/1
150 TABLET ORAL NIGHTLY PRN
Qty: 135 TABLET | Refills: 1 | Status: SHIPPED | OUTPATIENT
Start: 2023-09-13 | End: 2024-03-19 | Stop reason: SDUPTHER

## 2023-09-13 RX ORDER — DEXTROAMPHETAMINE SACCHARATE, AMPHETAMINE ASPARTATE, DEXTROAMPHETAMINE SULFATE AND AMPHETAMINE SULFATE 2.5; 2.5; 2.5; 2.5 MG/1; MG/1; MG/1; MG/1
10 TABLET ORAL 3 TIMES DAILY PRN
Qty: 90 TABLET | Refills: 0 | Status: SHIPPED | OUTPATIENT
Start: 2023-09-13 | End: 2023-09-15 | Stop reason: SDUPTHER

## 2023-09-13 NOTE — TELEPHONE ENCOUNTER
Pt's previous psychiatrist no longer accepts pt's insurance. Pt has run out of meds. Upcoming appt 10/11/23.    Received request via: Patient    Was the patient seen in the last year in this department? No    Does the patient have an active prescription (recently filled or refills available) for medication(s) requested? No    Does the patient have [facility] Plus and need 100 day supply (blood pressure, diabetes and cholesterol meds only)? Medication is not for cholesterol, blood pressure or diabetes

## 2023-09-14 ENCOUNTER — APPOINTMENT (OUTPATIENT)
Dept: SLEEP MEDICINE | Facility: MEDICAL CENTER | Age: 73
End: 2023-09-14
Attending: PHYSICIAN ASSISTANT
Payer: MEDICARE

## 2023-09-15 DIAGNOSIS — F90.0 ATTENTION DEFICIT HYPERACTIVITY DISORDER (ADHD), PREDOMINANTLY INATTENTIVE TYPE: ICD-10-CM

## 2023-09-15 RX ORDER — DEXTROAMPHETAMINE SACCHARATE, AMPHETAMINE ASPARTATE, DEXTROAMPHETAMINE SULFATE AND AMPHETAMINE SULFATE 2.5; 2.5; 2.5; 2.5 MG/1; MG/1; MG/1; MG/1
10 TABLET ORAL 3 TIMES DAILY PRN
Qty: 90 TABLET | Refills: 0 | Status: SHIPPED | OUTPATIENT
Start: 2023-09-15 | End: 2023-09-18 | Stop reason: SDUPTHER

## 2023-09-15 NOTE — TELEPHONE ENCOUNTER
Murali pt. Can you please send to [facility] [address]? She was unable to  d/t unavailability.    Received request via: Patient    Was the patient seen in the last year in this department? No    Does the patient have an active prescription (recently filled or refills available) for medication(s) requested? No    Does the patient have nursing home Plus and need 100 day supply (blood pressure, diabetes and cholesterol meds only)? Medication is not for cholesterol, blood pressure or diabetes

## 2023-09-18 DIAGNOSIS — F90.0 ATTENTION DEFICIT HYPERACTIVITY DISORDER (ADHD), PREDOMINANTLY INATTENTIVE TYPE: ICD-10-CM

## 2023-09-18 RX ORDER — DEXTROAMPHETAMINE SACCHARATE, AMPHETAMINE ASPARTATE, DEXTROAMPHETAMINE SULFATE AND AMPHETAMINE SULFATE 2.5; 2.5; 2.5; 2.5 MG/1; MG/1; MG/1; MG/1
10 TABLET ORAL 2 TIMES DAILY
Qty: 60 TABLET | Refills: 0 | Status: SHIPPED | OUTPATIENT
Start: 2023-09-18 | End: 2023-10-11

## 2023-09-18 RX ORDER — DEXTROAMPHETAMINE SACCHARATE, AMPHETAMINE ASPARTATE, DEXTROAMPHETAMINE SULFATE AND AMPHETAMINE SULFATE 2.5; 2.5; 2.5; 2.5 MG/1; MG/1; MG/1; MG/1
10 TABLET ORAL
Qty: 30 TABLET | Refills: 0 | Status: SHIPPED | OUTPATIENT
Start: 2023-09-18 | End: 2023-10-11

## 2023-09-18 NOTE — PROGRESS NOTES
Responding to question about coverage by her insurance for Adderall 10 mg TID, they will only cover BID but she did get it filled for TID in August 2023.

## 2023-09-18 NOTE — PROGRESS NOTES
Changing for insurance purpose, instead of 1 prescription for Adderall 10 mg TID, 2 prescriptions, one for BID and a second for 1 per day and she will pay for this one out of pocket.

## 2023-10-04 ENCOUNTER — GYNECOLOGY VISIT (OUTPATIENT)
Dept: OBGYN | Facility: CLINIC | Age: 73
End: 2023-10-04
Payer: MEDICARE

## 2023-10-04 ENCOUNTER — HOSPITAL ENCOUNTER (OUTPATIENT)
Facility: MEDICAL CENTER | Age: 73
End: 2023-10-04
Attending: PHYSICIAN ASSISTANT
Payer: MEDICARE

## 2023-10-04 VITALS
WEIGHT: 137 LBS | BODY MASS INDEX: 25.21 KG/M2 | HEIGHT: 62 IN | SYSTOLIC BLOOD PRESSURE: 125 MMHG | DIASTOLIC BLOOD PRESSURE: 87 MMHG

## 2023-10-04 DIAGNOSIS — N89.8 VAGINAL DISCHARGE: ICD-10-CM

## 2023-10-04 DIAGNOSIS — Z12.11 SCREENING FOR COLON CANCER: ICD-10-CM

## 2023-10-04 DIAGNOSIS — Z78.0 ASYMPTOMATIC MENOPAUSAL STATE: ICD-10-CM

## 2023-10-04 DIAGNOSIS — Z12.31 ENCOUNTER FOR SCREENING MAMMOGRAM FOR BREAST CANCER: ICD-10-CM

## 2023-10-04 PROCEDURE — 99203 OFFICE O/P NEW LOW 30 MIN: CPT | Performed by: PHYSICIAN ASSISTANT

## 2023-10-04 PROCEDURE — 87510 GARDNER VAG DNA DIR PROBE: CPT

## 2023-10-04 PROCEDURE — 3079F DIAST BP 80-89 MM HG: CPT | Performed by: PHYSICIAN ASSISTANT

## 2023-10-04 PROCEDURE — 87660 TRICHOMONAS VAGIN DIR PROBE: CPT

## 2023-10-04 PROCEDURE — 3074F SYST BP LT 130 MM HG: CPT | Performed by: PHYSICIAN ASSISTANT

## 2023-10-04 PROCEDURE — 87480 CANDIDA DNA DIR PROBE: CPT

## 2023-10-04 ASSESSMENT — FIBROSIS 4 INDEX: FIB4 SCORE: 2.04

## 2023-10-04 NOTE — PROGRESS NOTES
ANNUAL GYNECOLOGY VISIT    HPI:  Patient is a 73 y.o.  who presents for her annual exam. Pt c/o change in discharge intermittently over  last year - described as tan color. Symptoms are not bothersome or causing irritation, pt would like peace of mind she does not have an infection. Denies vaginal itching, odor, dryness. No vaginal bleeding. Admits to poor hydration.       PREVENTIVE CARE:  Immunization History   Administered Date(s) Administered    Pneumococcal Conjugate Vaccine (Prevnar/PCV-13) 10/06/2015    Pneumococcal polysaccharide vaccine (PPSV-23) 2018    Tdap Vaccine 2018       Last Colon CA screen: >10 years ago   Last Mammogram: 2022  Last DEXA:  osteopenia   Taking Calcium/Vit D Supp: combo supplement   Personal Hx of fracture as an adult: No  Hx of fracture in first-degree relative: No   race: Yes  Dementia: No  Poor health/frailty: No  Recurrent falls: No  Tobacco use: No  Low body weight (<127 lbs): No  Early menopause (age <45) or BSO: No  Alcoholism:No  Inadequate physical activity: Yes        CANCER RISK ASSESSMENT:  Family history of:   - Breast cancer: Sister    - Ovarian cancer: no   - Uterine cancer: no   - Colon cancer: no    GYN HX and ROS:   Last Pap: unknown  Hx Mod or Severe Dysplasia : No  Age at Menopause: 54y  Sexually Active: no    Postmenopausal bleeding: No  Sexual problems: No  Significant pelvic pain: No  Bothersome menopausal symptoms: No      ROS:    Positive ROS: see HPI  General: She denies excessive fatigue, weakness, unexplained weight loss or gain, abnormal thirst, unexplained fever/chills.  Neurologic: She denies fainting spells, convulsions, dizzy spells, frequent severe headaches, depression or anxiety or vision changes.  HEENT: She denies unusual skin moles, persistent swollen glands, pain or stiff neck, goiter or lump in her neck.  Heart / Lung: She denies persistent cough, shortness of breath, severe chest pain or recurrent heart  flutters.  Breast: She denies breast discharge, pain, lump or lump under her arm.  Gastrointestinal: She denies bloody stools, loss of appetite, change in bowel habits, constipation, diarrhea, nausea, vomiting or persistent abdominal pain.  Urinary: She denies dysuria, urgency, frequency, incontinence, hematuria, kidney or bladder infections.  Extremeties: She denies joint pain, persistently swollen ankles or recurrent leg cramps.        OBSTETRIC HISTORY:  OB History    Para Term  AB Living   2 1 1   1     SAB IAB Ectopic Molar Multiple Live Births     1              # Outcome Date GA Lbr Parish/2nd Weight Sex Delivery Anes PTL Lv   2 Term 71 40w0d   F Vag-Spont      1 IAB                MEDICAL HISTORY:  Past Medical History:   Diagnosis Date    ADD (attention deficit disorder)     Anxiety     Arthritis     CTS (carpal tunnel syndrome)     B/L    Depression     History of abdominoplasty 3/15/2012    Hyperlipidemia     Hypothyroidism     Midline low back pain without sciatica 2015    Osteoporosis 2014    PATENT FORAMEN OVALE LEFT TO RIGHT SHUNT 2010       MEDICATIONS:  Current Outpatient Medications   Medication Sig    amphetamine-dextroamphetamine (ADDERALL, 10MG,) 10 MG Tab Take 1 Tablet by mouth 2 times a day for 30 days. And use second prescription of Adderall 10 mg, take 1/day, for a 3rd daily dose of Adderall 10 mg.    traZODone (DESYREL) 100 MG Tab Take 1.5 Tablets by mouth at bedtime as needed for Sleep.    rosuvastatin (CRESTOR) 20 MG Tab Take 1 Tablet by mouth every evening for 100 days.    aspirin 81 MG EC tablet Take 1 Tablet by mouth every day.    MAGNESIUM PO     Potassium Gluconate 595 MG Cap     lisinopril (PRINIVIL) 10 MG Tab Take 1 Tablet by mouth every day.    tolterodine ER (DETROL LA) 4 MG CAPSULE SR 24 HR     Calcium Carbonate-Vitamin D (CALCIUM-D PO) Take 2 Tablets by mouth every day.    ibuprofen (MOTRIN) 600 MG Tab 1 po tid    amphetamine-dextroamphetamine  "(ADDERALL) 10 MG Tab Take 1 Tablet by mouth 1 time a day as needed (ADHD) for up to 30 days. Use this prescription to supplement the other prescription for Adderall 10 mg twice a day. So a total of 30 mg total per day in divided doses of 3 times per day, 10 mg per dose. (Patient not taking: Reported on 10/4/2023)    lamoTRIgine (LAMICTAL) 25 MG Tab  (Patient not taking: Reported on 10/4/2023)    sertraline (ZOLOFT) 25 MG tablet Take 25 mg by mouth every day.       ALLERGIES / REACTIONS:  Allergies   Allergen Reactions    Hydrocodone      Nightmares and \"makes my skin crawl\"       FAMILY HISTORY:  Family History   Problem Relation Age of Onset    Hypertension Mother     Cancer Mother         UTERUS    Heart Disease Mother         Arteriosclerotic Cardiovascular Disease    Other Mother         Coronary Artery Bypass Graft    Psychiatric Illness Mother     Arthritis Mother     Cancer Father         PANCREATIC    Cancer Sister 51        BREAST    Breast Cancer Sister     Stroke Neg Hx     Diabetes Neg Hx     Lung Disease Neg Hx        SURGICAL HISTORY:  Past Surgical History:   Procedure Laterality Date    PB OPEN TX TRIMALLEOLAR ANKLE FX W/O FIX PST LIP Right 9/12/2022    Procedure: RIGHT TRIMALLEOLAR OPEN REDUCTION INTERNAL FIXATION, RIGHT SYNDESMOSIS OPEN REDUCTION INTERNAL FIXATION;  Surgeon: Trey Conway M.D.;  Location: [facility] [place];  Service: Orthopedics    ABDOMINOPLASTY  1977    APPENDECTOMY      OTHER      TUMMY TUCK, BREAST REDUCTION, LIPOSUCTION OF HIPS AND THIGHS.    PB MAMMARY DUCTOGRAM, SINGLE      DC ANESTH,LOLA HYST FOL NEURAXIAL ANAL/ANES      DC BREAST REDUCTION      TONSILLECTOMY      TUBAL COAGULATION LAPAROSCOPIC BILATERAL         SOCIAL HISTORY:  Social History     Tobacco Use    Smoking status: Never    Smokeless tobacco: Never   Vaping Use    Vaping Use: Never used   Substance Use Topics    Alcohol use: Yes     Comment: rare    Drug use: Yes     Types: Marijuana     " "Comment: marijuana several times a year, H/O METHAMPHETAMINE FOR 17 YRS.  QUIT .        PHYSICAL EXAMINATION:  Vital Signs: /87   Ht 5' 2\"   Wt 137 lb   BMI 25.06 kg/m²    Constitutional: The patient is well developed and well nourished.  Psychiatric: Patient is oriented to time place and person.   Skin: No rash observed.  Neck: Appears symmetric. There are no masses or adenopathy present.  Cardiovascular: Regular rate and rhythm without murmur.  Lungs: Clear to ausculation.  Breast: Inspection reveals no asymetry or nipple discharge, no skin thickening, dimpling or erythema.  Palpation demonstrates no masses.  Abdomen: Soft, non-tender.  Pelvic Exam:       Vulva: External female genitalia within normal limits. No lesions      Urethra - no lesions, no erythema      Vagina: dry, pale pink, decreased ruggae      Cervix: pale, smooth, no lesions, no CMT      Uterus - non-tender, normal size, shape, contour, mobile, anteverted      Ovaries: non-tender, no appreciable masses    Pap Smear Performed: {No    Chaperone Present: MEI Ziegler  Extremeties: Legs are symmetric and without tenderness. There is no edema present.      ASSESSMENT AND PLAN:  73 y.o. .here for annual exam    1. Vaginal discharge    -- Discussed physiology of menopause leading to vaginal atrophy. Recommend OTC vaginal moisturizer such as Replens. Offered trial of estrogen cream, pt declines as sx are not bothersome.  - VAGINAL PATHOGENS DNA PANEL; Future    2. Encounter for screening mammogram for breast cancer    - MA-SCREENING MAMMO BILAT W/TOMOSYNTHESIS W/CAD; Future    3. Asymptomatic menopausal state    - DS-BONE DENSITY STUDY (DEXA); Future    4. Screening for colon cancer    - Referral to GI for Colonoscopy        Follow up: Annually or prn    Adele Chauhan P.A.-C.    "

## 2023-10-11 ENCOUNTER — TELEMEDICINE (OUTPATIENT)
Dept: BEHAVIORAL HEALTH | Facility: CLINIC | Age: 73
End: 2023-10-11
Payer: MEDICARE

## 2023-10-11 DIAGNOSIS — F33.2 SEVERE EPISODE OF RECURRENT MAJOR DEPRESSIVE DISORDER, WITHOUT PSYCHOTIC FEATURES (HCC): ICD-10-CM

## 2023-10-11 DIAGNOSIS — F90.0 ATTENTION DEFICIT HYPERACTIVITY DISORDER (ADHD), PREDOMINANTLY INATTENTIVE TYPE: ICD-10-CM

## 2023-10-11 DIAGNOSIS — Z79.899 HIGH RISK MEDICATION USE: ICD-10-CM

## 2023-10-11 DIAGNOSIS — I10 PRIMARY HYPERTENSION: ICD-10-CM

## 2023-10-11 PROCEDURE — 99214 OFFICE O/P EST MOD 30 MIN: CPT | Mod: 95,25 | Performed by: PSYCHIATRY & NEUROLOGY

## 2023-10-11 PROCEDURE — 90833 PSYTX W PT W E/M 30 MIN: CPT | Mod: 95 | Performed by: PSYCHIATRY & NEUROLOGY

## 2023-10-11 RX ORDER — ADHESIVE BANDAGE 3/4"
BANDAGE TOPICAL
Qty: 1 EACH | Refills: 0 | Status: SHIPPED | OUTPATIENT
Start: 2023-10-11 | End: 2023-12-14

## 2023-10-11 RX ORDER — DEXTROAMPHETAMINE SACCHARATE, AMPHETAMINE ASPARTATE, DEXTROAMPHETAMINE SULFATE AND AMPHETAMINE SULFATE 5; 5; 5; 5 MG/1; MG/1; MG/1; MG/1
20 TABLET ORAL 2 TIMES DAILY PRN
Qty: 60 TABLET | Refills: 0 | Status: SHIPPED | OUTPATIENT
Start: 2023-10-11 | End: 2023-10-16
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
## 2023-10-13 ENCOUNTER — HOSPITAL ENCOUNTER (OUTPATIENT)
Dept: RADIOLOGY | Facility: MEDICAL CENTER | Age: 73
End: 2023-10-13
Attending: PHYSICIAN ASSISTANT
Payer: MEDICARE

## 2023-10-13 DIAGNOSIS — Z78.0 ASYMPTOMATIC MENOPAUSAL STATE: ICD-10-CM

## 2023-10-13 DIAGNOSIS — Z12.31 ENCOUNTER FOR SCREENING MAMMOGRAM FOR BREAST CANCER: ICD-10-CM

## 2023-10-13 PROCEDURE — 77063 BREAST TOMOSYNTHESIS BI: CPT

## 2023-10-13 PROCEDURE — 77080 DXA BONE DENSITY AXIAL: CPT

## 2023-10-16 DIAGNOSIS — G47.10 HYPERSOMNIA: ICD-10-CM

## 2023-10-16 DIAGNOSIS — F90.0 ATTENTION DEFICIT HYPERACTIVITY DISORDER (ADHD), PREDOMINANTLY INATTENTIVE TYPE: ICD-10-CM

## 2023-10-16 RX ORDER — DEXTROAMPHETAMINE SACCHARATE, AMPHETAMINE ASPARTATE MONOHYDRATE, DEXTROAMPHETAMINE SULFATE AND AMPHETAMINE SULFATE 7.5; 7.5; 7.5; 7.5 MG/1; MG/1; MG/1; MG/1
30 CAPSULE, EXTENDED RELEASE ORAL EVERY MORNING
Qty: 30 CAPSULE | Refills: 0 | Status: SHIPPED | OUTPATIENT
Start: 2023-10-16 | End: 2023-11-08 | Stop reason: SDUPTHER

## 2023-10-16 NOTE — PROGRESS NOTES
Pharmacy out of adderall 20 mg and only has 30 mg, and so not able to reach 20 mg BID.  So changing to XR 30 mg.   Dressing: bandage

## 2023-10-17 ENCOUNTER — TELEPHONE (OUTPATIENT)
Dept: OBGYN | Facility: CLINIC | Age: 73
End: 2023-10-17
Payer: MEDICARE

## 2023-10-17 NOTE — TELEPHONE ENCOUNTER
Adele Chauhan P.A.-C.   10/17/2023 12:15 PM PDT Back to Top      Please let pt know her bone density per her   DEXA is now in the osteoporotic range requiring   medication to help manage. She either needs to   make an appt with us or her PCP to discuss her   options. IF she wants to see us please schedule   her at appt.   Adele Chauhan P.A.-C.     10/17/2023 1423  Called pt and notified as above. Pt agreed and verbalized understanding. Scheduled pt for an appt 10/18/2023 at 1115 with KELL Nix

## 2023-10-18 ENCOUNTER — GYNECOLOGY VISIT (OUTPATIENT)
Dept: OBGYN | Facility: CLINIC | Age: 73
End: 2023-10-18
Payer: MEDICARE

## 2023-10-18 VITALS — BODY MASS INDEX: 24.95 KG/M2 | SYSTOLIC BLOOD PRESSURE: 170 MMHG | WEIGHT: 136.4 LBS | DIASTOLIC BLOOD PRESSURE: 97 MMHG

## 2023-10-18 DIAGNOSIS — M81.0 AGE-RELATED OSTEOPOROSIS WITHOUT CURRENT PATHOLOGICAL FRACTURE: ICD-10-CM

## 2023-10-18 PROCEDURE — 99214 OFFICE O/P EST MOD 30 MIN: CPT | Performed by: PHYSICIAN ASSISTANT

## 2023-10-18 PROCEDURE — 3077F SYST BP >= 140 MM HG: CPT | Performed by: PHYSICIAN ASSISTANT

## 2023-10-18 PROCEDURE — 3080F DIAST BP >= 90 MM HG: CPT | Performed by: PHYSICIAN ASSISTANT

## 2023-10-18 RX ORDER — ALENDRONATE SODIUM 70 MG/1
70 TABLET ORAL
Qty: 12 TABLET | Refills: 3 | Status: SHIPPED | OUTPATIENT
Start: 2023-10-18

## 2023-10-18 ASSESSMENT — FIBROSIS 4 INDEX: FIB4 SCORE: 2.04

## 2023-10-18 NOTE — PROGRESS NOTES
"GYN PROBLEM VISIT    CC:  Results       HPI: Patient is a 73 y.o.  patient is postmenopausal in to discuss abnormal DEXA showing osteoporosis (see full report below). Prior DEXA in 2018 was osteopenia.     Taking Calcium/Vit D Supp: combo supplement   Personal Hx of fracture as an adult: No  Hx of fracture in first-degree relative: No   race: Yes  Dementia: No  Poor health/frailty: No  Recurrent falls: No  Tobacco use: No  Low body weight (<127 lbs): No  Early menopause (age <45) or BSO: No  Alcoholism:No  Inadequate physical activity: Yes, no weight bearing activities, very inactive.       ROS:   General: denies fever / chills  HEENT: denies sore throat:  CV: denies chest pain:  Repiratory: denies shortness of breath  GI: denies abdominal pain  : denies dysuria:    PFSH:  I personally reviewed the past medical and surgical histories.     Social History     Tobacco Use    Smoking status: Never    Smokeless tobacco: Never   Vaping Use    Vaping Use: Never used   Substance Use Topics    Alcohol use: Yes     Comment: rare    Drug use: Yes     Types: Marijuana     Comment: marijuana several times a year, H/O METHAMPHETAMINE FOR 17 YRS.  QUIT .        Social History     Substance and Sexual Activity   Sexual Activity Not Currently    Birth control/protection: Post-Menopausal        ALLERGIES / REACTIONS:  Allergies   Allergen Reactions    Hydrocodone      Nightmares and \"makes my skin crawl\"                           PHYSICAL EXAMINATION:  Vital Signs:   BP (!) 170/97   Wt 136 lb 6.4 oz   BMI 24.95 kg/m²     Gen: appears well, NAD  Respiratory: normal effort  Abdomen: Soft, non-tender.      DEXA 10/13/2023 4:05 PM     HISTORY/REASON FOR EXAM:  Postmenopausal, adult bone fracture, osteopenia L1-L4 levels and proximal left femur.     TECHNIQUE/EXAM DESCRIPTION AND NUMBER OF VIEWS:  Dual x-ray bone densitometry was performed from the L1 through L4 levels and from the proximal left femur utilizing the " GE Prodigy unit.     COMPARISON: Bone densitometry scan 2018.     FINDINGS:  The lumbar spine has a mean bone mineral density of 0.845 g/cm2, with a T score of -2.8 and a Z score of -1.0.     The proximal left femur has a mean bone mineral density of 0.887 g/cm2, with a T score of -1.0 and a Z score of 0.7.     When compared with the most recent study dated 2018, there has been a 8.5% decrease in the bone mineral density of the lumbar spine and a 5.1% decrease in the bone mineral density of the proximal left femur.     IMPRESSION:     According to the World Health Organization classification, bone mineral density of this patient is osteoporotic in the spine and osteopenic in the proximal left femur.     10-year Probability of Fracture:  Major Osteoporotic     16.5%  Hip     2.6%  Population      [place] ()     Based on left femur neck BMD        ASSESSMENT AND PLAN:  73 y.o.      1. Age-related osteoporosis without current pathological fracture    - Reviewed DEXA scan and diagnosis of osteoporosis with T-score of -2.8 ins spine, femur showed osteopenia. Advised need for medication management in addition to her Vit D/Ca supplement. Strongly encouraged weight bearing activities as currently pt is very sedentary.  - Rx for alendronate (FOSAMAX) 70 MG Tab; Take 1 Tablet by mouth every 7 days.  Discussed med use, risks, adverse effects, and duration of use to be 5 years.     BP quite elevated, pt anxious about results. Strongly advised she f/u with PCP who has been managing her BP.     Follow up prn    Adele Chauhan P.A.-C.

## 2023-11-06 ENCOUNTER — OFFICE VISIT (OUTPATIENT)
Dept: BEHAVIORAL HEALTH | Facility: CLINIC | Age: 73
End: 2023-11-06
Payer: MEDICARE

## 2023-11-06 DIAGNOSIS — F33.1 MAJOR DEPRESSIVE DISORDER, RECURRENT EPISODE, MODERATE (HCC): ICD-10-CM

## 2023-11-06 DIAGNOSIS — F90.0 ATTENTION DEFICIT HYPERACTIVITY DISORDER (ADHD), PREDOMINANTLY INATTENTIVE TYPE: ICD-10-CM

## 2023-11-06 PROCEDURE — 90834 PSYTX W PT 45 MINUTES: CPT | Performed by: PSYCHOLOGIST
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
## 2023-11-08 ENCOUNTER — TELEMEDICINE (OUTPATIENT)
Dept: BEHAVIORAL HEALTH | Facility: CLINIC | Age: 73
End: 2023-11-08
Payer: MEDICARE

## 2023-11-08 DIAGNOSIS — G47.10 HYPERSOMNIA: ICD-10-CM

## 2023-11-08 DIAGNOSIS — F42.3 HOARDING DISORDER: ICD-10-CM

## 2023-11-08 DIAGNOSIS — F90.0 ATTENTION DEFICIT HYPERACTIVITY DISORDER (ADHD), PREDOMINANTLY INATTENTIVE TYPE: ICD-10-CM

## 2023-11-08 PROCEDURE — 99214 OFFICE O/P EST MOD 30 MIN: CPT | Mod: 95 | Performed by: PSYCHIATRY & NEUROLOGY

## 2023-11-08 PROCEDURE — 90833 PSYTX W PT W E/M 30 MIN: CPT | Mod: 95 | Performed by: PSYCHIATRY & NEUROLOGY

## 2023-11-08 RX ORDER — DEXTROAMPHETAMINE SACCHARATE, AMPHETAMINE ASPARTATE MONOHYDRATE, DEXTROAMPHETAMINE SULFATE AND AMPHETAMINE SULFATE 7.5; 7.5; 7.5; 7.5 MG/1; MG/1; MG/1; MG/1
30 CAPSULE, EXTENDED RELEASE ORAL EVERY MORNING
Qty: 30 CAPSULE | Refills: 0 | Status: SHIPPED | OUTPATIENT
Start: 2023-11-15 | End: 2023-12-19 | Stop reason: SDUPTHER
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
## 2023-11-09 ENCOUNTER — PATIENT OUTREACH (OUTPATIENT)
Dept: HEALTH INFORMATION MANAGEMENT | Facility: OTHER | Age: 73
End: 2023-11-09
Payer: MEDICARE

## 2023-11-09 SDOH — ECONOMIC STABILITY: HOUSING INSECURITY
IN THE LAST 12 MONTHS, WAS THERE A TIME WHEN YOU DID NOT HAVE A STEADY PLACE TO SLEEP OR SLEPT IN A SHELTER (INCLUDING NOW)?: NO

## 2023-11-09 SDOH — ECONOMIC STABILITY: FOOD INSECURITY: WITHIN THE PAST 12 MONTHS, THE FOOD YOU BOUGHT JUST DIDN'T LAST AND YOU DIDN'T HAVE MONEY TO GET MORE.: NEVER TRUE

## 2023-11-09 SDOH — HEALTH STABILITY: PHYSICAL HEALTH: ON AVERAGE, HOW MANY MINUTES DO YOU ENGAGE IN EXERCISE AT THIS LEVEL?: 10 MIN

## 2023-11-09 SDOH — ECONOMIC STABILITY: HOUSING INSECURITY: IN THE LAST 12 MONTHS, HOW MANY PLACES HAVE YOU LIVED?: 1

## 2023-11-09 SDOH — ECONOMIC STABILITY: FOOD INSECURITY: WITHIN THE PAST 12 MONTHS, YOU WORRIED THAT YOUR FOOD WOULD RUN OUT BEFORE YOU GOT MONEY TO BUY MORE.: SOMETIMES TRUE

## 2023-11-09 SDOH — ECONOMIC STABILITY: TRANSPORTATION INSECURITY
IN THE PAST 12 MONTHS, HAS LACK OF TRANSPORTATION KEPT YOU FROM MEETINGS, WORK, OR FROM GETTING THINGS NEEDED FOR DAILY LIVING?: NO

## 2023-11-09 SDOH — ECONOMIC STABILITY: INCOME INSECURITY: IN THE LAST 12 MONTHS, WAS THERE A TIME WHEN YOU WERE NOT ABLE TO PAY THE MORTGAGE OR RENT ON TIME?: NO

## 2023-11-09 SDOH — ECONOMIC STABILITY: TRANSPORTATION INSECURITY
IN THE PAST 12 MONTHS, HAS THE LACK OF TRANSPORTATION KEPT YOU FROM MEDICAL APPOINTMENTS OR FROM GETTING MEDICATIONS?: NO

## 2023-11-09 SDOH — HEALTH STABILITY: PHYSICAL HEALTH: ON AVERAGE, HOW MANY DAYS PER WEEK DO YOU ENGAGE IN MODERATE TO STRENUOUS EXERCISE (LIKE A BRISK WALK)?: 1 DAY

## 2023-11-09 ASSESSMENT — LIFESTYLE VARIABLES
AUDIT-C TOTAL SCORE: 1
HOW MANY STANDARD DRINKS CONTAINING ALCOHOL DO YOU HAVE ON A TYPICAL DAY: 1 OR 2
SKIP TO QUESTIONS 9-10: 1
HOW OFTEN DO YOU HAVE SIX OR MORE DRINKS ON ONE OCCASION: NEVER
HOW OFTEN DO YOU HAVE A DRINK CONTAINING ALCOHOL: MONTHLY OR LESS

## 2023-11-09 ASSESSMENT — SOCIAL DETERMINANTS OF HEALTH (SDOH)
HOW HARD IS IT FOR YOU TO PAY FOR THE VERY BASICS LIKE FOOD, HOUSING, MEDICAL CARE, AND HEATING?: HARD
IN THE PAST 12 MONTHS, HAS THE ELECTRIC, GAS, OIL, OR WATER COMPANY THREATENED TO SHUT OFF SERVICE IN YOUR HOME?: NO

## 2023-11-09 NOTE — PROGRESS NOTES
11/09/2023  [facility] SW received a referral from behavioral services.  @1325 SW called Kristen. [facility] SW assessment was conducted, areas of needs identified.  @1415 SW submitted an online application for [facility] services  @1420 SW mailed Kristen resources (CBC application, [facility], Home style direct meals, cleaning companies).  Social Work Assessment  Community Care Management    Synopsis: Pt was referred to  by behavioral health. Pt has ADD, OCD, and struggles to accomplish household tasks. Pt is aware that her house is getting cluttered and she needs help with cleaning and organization.    Living Situation/Home Environment: Pt lives alone in the apartment, section 8 housing, pays 195/month in rent. Pt stated she needs help with cleaning and organizing as her place is getting cluttered and she needs to find help.  Pt would like to find someone to clean her home. This SW discussed paid options like private companies, advised Kristen to talk to her family so maybe they can help cover the costs for initial cleaning.  Based on income and age pt might qualify for  services through [facility] or Homemaker program through the [facility]. This programs are free but this SW informed Kristen of wait list and exclusion/inclusion criteria. Kristen expressed understanding and would like to try to apply for these programs.    Financial Situation/Sources of Income: Pt stated that she gets /months and food stamps. No savings or other sources of income.  Transportation: Pt has a car and can drive but her old car didn't pass the smog check and she is working on that. Her friend and daughter help her with transportation when needed. SW informed Kristen that [facility] provides Uber rides for medical appointments.  Support System: Pt has daughter, 2 grand kids who sometimes help but they work full time.  Mental Health/Substance Abuse Hx: Pt has a long history of depression, anxiety, ADD, OCD  and is under psychiatric care. During assessment pt denied any suicidal ideation, not plan to hurt herself or others.Pt denied alcohol or elicit drugs misuse.  Ability to Obtain Basic Resources: Pt stated she is independent with ADLS. She can cook but is having hard time doing that due to her OCD and attention problems.  SW discussed Home style direct (through Medicaid) and meals on wheels. Pt is interested in meal programs.This SW also informed pt about [facility] which is walking distance from Kristen and might help saving on food costs.  Physical Functioning: Pt doesn't need assistance with ADLS.  Patient's Perception of Needs: Pt would like to find help to clean and organized her home. Kristen also would like to eat better and have access to cooked meals.  AD Discussion?: Pt has no AD, discussed.    Plan: SW will mail following resources to [facility] program application through [employer].  Cleaning services in [place]  Home style direct meals contact information  [facility]  This SW will submit online application for [facility] services (homemaker program and meals on wheels).      Barriers:  Pt has limited income and most likely can't afford private cleaning services. Limited subsidized programs in [place],.  Start Date: 11/09/2023  Anticipated Goal Achievement Date:  TBD        Next Scheduled patient outreach:  11/17/2023  Social Work Care Coordinator:  Dyana Torres  Community Care Management:  [phone]

## 2023-11-14 ENCOUNTER — OFFICE VISIT (OUTPATIENT)
Dept: BEHAVIORAL HEALTH | Facility: CLINIC | Age: 73
End: 2023-11-14
Payer: MEDICARE

## 2023-11-14 DIAGNOSIS — F33.41 RECURRENT MAJOR DEPRESSIVE DISORDER, IN PARTIAL REMISSION (HCC): ICD-10-CM

## 2023-11-14 DIAGNOSIS — F90.0 ATTENTION DEFICIT HYPERACTIVITY DISORDER (ADHD), PREDOMINANTLY INATTENTIVE TYPE: ICD-10-CM

## 2023-11-14 PROCEDURE — 90834 PSYTX W PT 45 MINUTES: CPT
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
## 2023-11-17 ENCOUNTER — PATIENT OUTREACH (OUTPATIENT)
Dept: HEALTH INFORMATION MANAGEMENT | Facility: OTHER | Age: 73
End: 2023-11-17
Payer: MEDICARE

## 2023-11-17 NOTE — PROGRESS NOTES
11/17/2023  @0955 SW called Kristen . Pt informed this SW that she is doing ok and plans to talk to her children to help paying for cleaning services.  This SW informed Kristen that application for [facility]  program was submitted but this SW was infromed that wait time is 3 years estimated.  Kristen stated she will try to create a plan using resources provided. Pt didn't ask any further assistance.   Referral is closed.

## 2023-11-28 ENCOUNTER — APPOINTMENT (OUTPATIENT)
Dept: BEHAVIORAL HEALTH | Facility: CLINIC | Age: 73
End: 2023-11-28
Payer: MEDICARE

## 2023-11-30 ENCOUNTER — APPOINTMENT (OUTPATIENT)
Dept: SLEEP MEDICINE | Facility: MEDICAL CENTER | Age: 73
End: 2023-11-30
Attending: PHYSICIAN ASSISTANT
Payer: MEDICARE

## 2023-12-01 ENCOUNTER — OFFICE VISIT (OUTPATIENT)
Dept: SLEEP MEDICINE | Facility: MEDICAL CENTER | Age: 73
End: 2023-12-01
Attending: NURSE PRACTITIONER
Payer: MEDICARE

## 2023-12-01 VITALS
BODY MASS INDEX: 24.35 KG/M2 | HEART RATE: 99 BPM | DIASTOLIC BLOOD PRESSURE: 80 MMHG | RESPIRATION RATE: 16 BRPM | WEIGHT: 132.3 LBS | HEIGHT: 62 IN | SYSTOLIC BLOOD PRESSURE: 140 MMHG | OXYGEN SATURATION: 96 %

## 2023-12-01 DIAGNOSIS — G47.33 OSA (OBSTRUCTIVE SLEEP APNEA): ICD-10-CM

## 2023-12-01 PROCEDURE — 3079F DIAST BP 80-89 MM HG: CPT | Performed by: NURSE PRACTITIONER

## 2023-12-01 PROCEDURE — 99213 OFFICE O/P EST LOW 20 MIN: CPT | Performed by: NURSE PRACTITIONER

## 2023-12-01 PROCEDURE — 3077F SYST BP >= 140 MM HG: CPT | Performed by: NURSE PRACTITIONER

## 2023-12-01 PROCEDURE — 99212 OFFICE O/P EST SF 10 MIN: CPT | Performed by: NURSE PRACTITIONER

## 2023-12-01 ASSESSMENT — FIBROSIS 4 INDEX: FIB4 SCORE: 2.04

## 2023-12-01 NOTE — PROGRESS NOTES
Chief Complaint   Patient presents with    Follow-Up     LAST SEEN 06/12/2023 JOANNE CH  F/[facility] SLEEP COMPLIANCE.       HPI:  Kristen Carrera is a 73 y.o. year old female here today for follow-up on MAX.  Last seen 6/12/2023..  Medical history includes attention deficit, hypersomnolence, insomnia, hypertension, mild memory loss, history of hypothyroidism, hyperlipidemia, PFO txed/followed by cardiology, moderate recurrent depressive disorder.   Patient is on Adderall managed by behavioral health specialist for her hypersomnolence/ADD.  Patient also reports using Ambien which helps make her sleep patterns more regular also per behavioral health.  Patient has had sleepwalking while taking Ambien. er Dr. Myers's notes she underwent polysomnogram and MSLT testing at outside facility with Dr. Pham. PSG 10/20/2011 showed overall AHI of 4.2.  Home sleep testing via apnea link 8/21/2011 showed overall AHI of 10.  Previous MSLT in 2011 showed a Mean Sleep latency of 2.8 min on 5 naps.      Sleep schedule goes to bed around 10 PM, wakens 8 to 10 AM , and gets up during the night sometimes  patient reports sleeping up to 10 hours/day at times.  Symptoms experiences daytime somnolence , reports morning headaches , and naps during the day    Presents for first compliance on BiPAP.  She received her BiPAP approximately 2 months ago from my sleep.  She is currently using a over the nose nasal mask and denies any difficulty with mask fit or pressures.  She does state it does occasionally leak but adjustment fix the problem.  She does note that on some occasions her nose is swollen when she will stop.  She is not currently using heated tubing and denies any nasal dryness.  She gets between 8 to 10 hours of sleep and denies any difficulty falling or staying asleep.  As previously mentioned she does have hypersomnolence, ADHD, and often takes Ambien as well.  Managed by behavioral health.  Overall since starting on BiPAP she notes  improvement in sleep quality denies any excessive daytime sleepiness, morning headaches, palpitations, concentration or memory problems.    30-day compliance reviewed with patient shows 73% use with an average time of 7 hours and 28 minutes and a resultant AHI of 4.2 with no evidence of persistent mask leak.    ROS: As per HPI and otherwise negative if not stated.    Past Medical History:   Diagnosis Date    ADD (attention deficit disorder)     Anxiety     Arthritis     CTS (carpal tunnel syndrome)     B/L    Depression     History of abdominoplasty 3/15/2012    Hyperlipidemia     Hypothyroidism     Midline low back pain without sciatica 8/4/2015    Osteoporosis 8/19/2014    PATENT FORAMEN OVALE LEFT TO RIGHT SHUNT 12/8/2010       Past Surgical History:   Procedure Laterality Date    PB OPEN TX TRIMALLEOLAR ANKLE FX W/O FIX PST LIP Right 9/12/2022    Procedure: RIGHT TRIMALLEOLAR OPEN REDUCTION INTERNAL FIXATION, RIGHT SYNDESMOSIS OPEN REDUCTION INTERNAL FIXATION;  Surgeon: Trey Conway M.D.;  Location: [facility];  Service: Orthopedics    ABDOMINOPLASTY  1977    APPENDECTOMY      OTHER      TUMMY TUCK, BREAST REDUCTION, LIPOSUCTION OF HIPS AND THIGHS.    PB MAMMARY DUCTOGRAM, SINGLE      IN ANESTH,LOLA HYST FOL NEURAXIAL ANAL/ANES      IN BREAST REDUCTION      TONSILLECTOMY      TUBAL COAGULATION LAPAROSCOPIC BILATERAL         Family History   Problem Relation Age of Onset    Hypertension Mother     Cancer Mother         UTERUS    Heart Disease Mother         Arteriosclerotic Cardiovascular Disease    Other Mother         Coronary Artery Bypass Graft    Psychiatric Illness Mother     Arthritis Mother     Cancer Father         PANCREATIC    Cancer Sister 51        BREAST    Breast Cancer Sister     Stroke Neg Hx     Diabetes Neg Hx     Lung Disease Neg Hx        Allergies as of 12/01/2023 - Reviewed 12/01/2023   Allergen Reaction Noted    Hydrocodone  01/05/2009        Vitals:  BP (!)  "140/80 (BP Location: Left arm, Patient Position: Sitting, BP Cuff Size: Adult)   Pulse 99   Resp 16   Ht 1.575 m (5' 2\")   Wt 60 kg (132 lb 4.8 oz)   SpO2 96%     Current medications as of today   Current Outpatient Medications   Medication Sig Dispense Refill    amphetamine-dextroamphetamine ER (ADDERALL XR) 30 MG XR capsule Take 1 Capsule by mouth every morning for 30 days. 30 Capsule 0    alendronate (FOSAMAX) 70 MG Tab Take 1 Tablet by mouth every 7 days. 12 Tablet 3    Blood Pressure Monitoring (BLOOD PRESSURE CUFF) [facility] OMRON SERIES 3 OR SERIES 5 DIGITAL UPPER ARM BLOOD PRESSURE CUFF. 1 Each 0    traZODone (DESYREL) 100 MG Tab Take 1.5 Tablets by mouth at bedtime as needed for Sleep. 135 Tablet 1    rosuvastatin (CRESTOR) 20 MG Tab Take 1 Tablet by mouth every evening for 100 days. 100 Tablet 1    aspirin 81 MG EC tablet Take 1 Tablet by mouth every day. 100 Tablet 1    MAGNESIUM PO       lisinopril (PRINIVIL) 10 MG Tab Take 1 Tablet by mouth every day. 90 Tablet 2    tolterodine ER (DETROL LA) 4 MG CAPSULE SR 24 HR       Calcium Carbonate-Vitamin D (CALCIUM-D PO) Take 2 Tablets by mouth every day.      ibuprofen (MOTRIN) 600 MG Tab 1 po tid 42 Tablet 0    Potassium Gluconate 595 MG Cap  (Patient not taking: Reported on 12/1/2023)       No current facility-administered medications for this visit.         Physical Exam:   Gen:           Alert and oriented, No apparent distress. Mood and affect appropriate, normal interaction with examiner.  Eyes:          PERRL, EOM intact, sclere white, conjunctive moist.  Ears:          Not examined.   Hearing:     Grossly intact.  Nose:          Normal, no lesions or deformities.  Dentition:    Good dentition.  Oropharynx:   Tongue normal, posterior pharynx without erythema or exudate.  Neck:        Supple, trachea midline, no masses.  Respiratory Effort: No intercostal retractions or use of accessory muscles.   Lung Auscultation:      Clear to auscultation bilaterally; " no rales, rhonchi or wheezing.  CV:            Regular rate and rhythm. No murmurs, rubs or gallops.  Abd:           Not examined.   Lymphadenopathy: Not examined.  Gait and Station: Normal.  Digits and Nails: No clubbing, cyanosis, petechiae, or nodes.   Cranial Nerves: II-XII grossly intact.  Skin:        No rashes, lesions or ulcers noted.               Ext:           No cyanosis or edema.      Assessment:  1. Severe MAX (obstructive sleep apnea)            Plan:  Benefiting from BiPAP therapy.  Compliance shows adequate use and control of MAX.  Order placed for heated tubing through [employer] as patient complains of nasal swelling after using CPAP.  Will see if this addresses the problem, but if not can consider referral to ENT.  Patient will follow-up in 9 months, but can be seen sooner if needed.    Please note that this dictation was created using voice recognition software. I have made every reasonable attempt to correct obvious errors, but it is possible there are errors of grammar and possibly content that I did not discover before finalizing the note.

## 2023-12-05 ENCOUNTER — TELEMEDICINE (OUTPATIENT)
Dept: BEHAVIORAL HEALTH | Facility: CLINIC | Age: 73
End: 2023-12-05
Payer: MEDICARE

## 2023-12-05 DIAGNOSIS — F90.0 ATTENTION DEFICIT HYPERACTIVITY DISORDER (ADHD), PREDOMINANTLY INATTENTIVE TYPE: ICD-10-CM

## 2023-12-05 DIAGNOSIS — F33.2 SEVERE RECURRENT MAJOR DEPRESSION WITHOUT PSYCHOTIC FEATURES (HCC): ICD-10-CM

## 2023-12-05 DIAGNOSIS — R41.3 COMPLAINTS OF MEMORY DISTURBANCE: ICD-10-CM

## 2023-12-05 PROCEDURE — 90833 PSYTX W PT W E/M 30 MIN: CPT | Mod: 95 | Performed by: PSYCHIATRY & NEUROLOGY

## 2023-12-05 PROCEDURE — 99214 OFFICE O/P EST MOD 30 MIN: CPT | Mod: 95 | Performed by: PSYCHIATRY & NEUROLOGY
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
## 2023-12-06 ENCOUNTER — OFFICE VISIT (OUTPATIENT)
Dept: BEHAVIORAL HEALTH | Facility: CLINIC | Age: 73
End: 2023-12-06
Payer: MEDICARE

## 2023-12-06 DIAGNOSIS — F33.41 RECURRENT MAJOR DEPRESSIVE DISORDER, IN PARTIAL REMISSION (HCC): ICD-10-CM

## 2023-12-06 DIAGNOSIS — F90.0 ATTENTION DEFICIT HYPERACTIVITY DISORDER (ADHD), PREDOMINANTLY INATTENTIVE TYPE: ICD-10-CM

## 2023-12-06 PROCEDURE — 90834 PSYTX W PT 45 MINUTES: CPT

## 2023-12-06 NOTE — PROGRESS NOTES
"[facility]   Therapy Progress Note      Therapy was provided on this date in coordination with the [facility] approved Clinical Supervisor under the direct supervision of Dr. Chang who was on site during this visit.    Therapist reviewed informed consent, limits of confidentiality and [facility] policies; patient expressed understanding and agreed to voluntarily proceed with evaluation and treatment.    Name: Kristen Carrera  MRN: [id number]  : 1950  Age: 73 y.o.  Date of assessment: 2023  PCP: Binta To M.D.  Persons in attendance: Patient, Frances Grace, W-Intern and Jeanna Henriquez, W-Intern  Total session time: 50 minutes      Topics addressed in psychotherapy include:     Client reported making progress in the her relationship with daughter, Annalee. \"I watched the 7mo great-grandbaby and when Annalee came to pick him up she was pleasant\". Discussed current state of client's relationships with her grandkids and great-grandkids. Client reported granddaughter-in-law plans to take her to the [facility] to straighten out issues with registration so she can start visiting the great-grandkids again. Client reported listening to background conversation where she was pleasantly surprised at how much more patient and kind her grandson, Aung, sounded with her great-grandkids (who he is legal guardian of). Client also reported she has made progress in organizing her apartment. \"My grandson, Jeffrey, has a new girlfriend and he brought her over to my apartment but I was embarrassed... I even hung a curtain to hide the kitchen so she couldn't see it\". Client reported she has successfully cleaned off a table and two chairs, and organized the items so they could be put away neatly. Client believes improved quality of sleep may be partially responsible for an increase in motivation to clean. Discussed setting realistic goals and expectations so she can continue to make progress. " Client seemed in good spirits and reported feeling better about what she has accomplished so far. Client would consistently begin explaining a situation and lose track of her thought process, but could recall once reminded of initial subject. Will continue to encourage organizing/cleaning apartment, and provide emotional support and validation.     Objective Observations:   Participation:Active verbal participation, Attentive, Engaged, and Open to feedback   Grooming:Casual   Cognition:Alert and Fully Oriented   Eye Contact:Good   Mood:Happy   Affect:Congruent with content and Happy   Thought Process:Tangential   Speech:Rate within normal limits and Volume within normal limits    Current Risk:   Suicide:  Not indicated   Homicide:  Not indicated   Self-Harm:  Not indicated    Relapse:  Not indicated    Safety Plan Reviewed: not applicable        Diagnosis:  1. Attention deficit hyperactivity disorder (ADHD), predominantly inattentive type    2. Recurrent major depressive disorder, in partial remission (HCC)            Frances Grace LMSW, CSW-Intern

## 2023-12-12 ENCOUNTER — OFFICE VISIT (OUTPATIENT)
Dept: BEHAVIORAL HEALTH | Facility: CLINIC | Age: 73
End: 2023-12-12
Payer: MEDICARE

## 2023-12-12 DIAGNOSIS — F90.0 ATTENTION DEFICIT HYPERACTIVITY DISORDER (ADHD), PREDOMINANTLY INATTENTIVE TYPE: ICD-10-CM

## 2023-12-12 DIAGNOSIS — F33.41 RECURRENT MAJOR DEPRESSIVE DISORDER, IN PARTIAL REMISSION (HCC): ICD-10-CM

## 2023-12-12 PROCEDURE — 90834 PSYTX W PT 45 MINUTES: CPT

## 2023-12-12 NOTE — PROGRESS NOTES
"[facility]   Therapy Progress Note      Therapy was provided on this date in coordination with the [facility] approved Clinical Supervisor under the direct supervision of Dr. Chang who was on site during this visit.    Therapist reviewed informed consent, limits of confidentiality and [facility] policies; patient expressed understanding and agreed to voluntarily proceed with evaluation and treatment.    Name: Kristen Carrera  MRN: [id number]  : 1950  Age: 73 y.o.  Date of assessment: 2023  PCP: Binta To M.D.  Persons in attendance: Patient and MARYANNE Foreman-Intern  Total session time: 50 minutes      Topics addressed in psychotherapy include:     \"I visited Annalee and helped her put the tree up and got to see the great-grandbaby\". Client reported starting meals-on-wheels this week, filling out paperwork for someone to come over once a week and help with laundry and light cleaning, and is waiting to hear back about referrals for assistance with organizing apartment. Explored client's expectations of what the organization process will look like \"Hopeful they will help keep me on track and then everything can get organized, and with the weekly cleaning it will stay that way\". Client reported she has been losing weight over the past two and half months but does not know why. Discussed talking with PCM about weight loss concerns. Still struggling to find motivation to leave apartment. \"I just don't feel like leaving... I hate dealing with people... even going to see the grandbabies is good motivation to get out but the whole time I am getting ready to go I don't feel like it\". Explored other sources of motivation that may be helpful. Client considering an JANIS for her mental health and so she has a reason to leave the apartment. Will continue to encourage organizing/cleaning apartment and provide emotional support and validation.     Objective " Observations:   Participation:Active verbal participation, Attentive, Engaged, and Open to feedback   Grooming:Casual   Cognition:Alert and Fully Oriented   Eye Contact:Good   Mood:Anxious   Affect:Congruent with content and Anxious   Thought Process:Tangential   Speech:Rate within normal limits and Volume within normal limits    Current Risk:   Suicide:  Not indicated   Homicide:  Not indicated   Self-Harm:  Not indicated    Relapse:  Not indicated    Safety Plan Reviewed: not applicable        Diagnosis:  1. Attention deficit hyperactivity disorder (ADHD), predominantly inattentive type    2. Recurrent major depressive disorder, in partial remission (HCC)            Frances Grace, ANGIE, CSW-Intern

## 2023-12-14 ENCOUNTER — OFFICE VISIT (OUTPATIENT)
Dept: MEDICAL GROUP | Facility: MEDICAL CENTER | Age: 73
End: 2023-12-14
Attending: INTERNAL MEDICINE
Payer: MEDICARE

## 2023-12-14 VITALS
TEMPERATURE: 98.1 F | DIASTOLIC BLOOD PRESSURE: 80 MMHG | WEIGHT: 135 LBS | SYSTOLIC BLOOD PRESSURE: 156 MMHG | HEART RATE: 100 BPM | OXYGEN SATURATION: 95 % | RESPIRATION RATE: 16 BRPM | HEIGHT: 62 IN | BODY MASS INDEX: 24.84 KG/M2

## 2023-12-14 DIAGNOSIS — L98.9 SKIN LESION: ICD-10-CM

## 2023-12-14 DIAGNOSIS — I10 PRIMARY HYPERTENSION: ICD-10-CM

## 2023-12-14 DIAGNOSIS — M81.0 OSTEOPOROSIS, UNSPECIFIED OSTEOPOROSIS TYPE, UNSPECIFIED PATHOLOGICAL FRACTURE PRESENCE: ICD-10-CM

## 2023-12-14 DIAGNOSIS — Z87.81 H/O FRACTURE OF ANKLE: ICD-10-CM

## 2023-12-14 PROBLEM — Z02.89 ENCOUNTER FOR COMPLETION OF FORM WITH PATIENT: Status: RESOLVED | Noted: 2023-06-29 | Resolved: 2023-12-14

## 2023-12-14 PROBLEM — R41.3 MEMORY LOSS: Status: RESOLVED | Noted: 2018-09-05 | Resolved: 2023-12-14

## 2023-12-14 PROBLEM — F33.2 SEVERE EPISODE OF RECURRENT MAJOR DEPRESSIVE DISORDER, WITHOUT PSYCHOTIC FEATURES (HCC): Status: RESOLVED | Noted: 2023-06-01 | Resolved: 2023-12-14

## 2023-12-14 PROBLEM — E66.3 OVERWEIGHT (BMI 25.0-29.9): Status: RESOLVED | Noted: 2023-01-09 | Resolved: 2023-12-14

## 2023-12-14 PROCEDURE — 3079F DIAST BP 80-89 MM HG: CPT | Performed by: INTERNAL MEDICINE

## 2023-12-14 PROCEDURE — 99214 OFFICE O/P EST MOD 30 MIN: CPT | Performed by: INTERNAL MEDICINE

## 2023-12-14 PROCEDURE — 3077F SYST BP >= 140 MM HG: CPT | Performed by: INTERNAL MEDICINE

## 2023-12-14 PROCEDURE — 99213 OFFICE O/P EST LOW 20 MIN: CPT | Performed by: INTERNAL MEDICINE

## 2023-12-14 RX ORDER — ROSUVASTATIN CALCIUM 20 MG/1
20 TABLET, COATED ORAL EVERY EVENING
COMMUNITY
Start: 2023-12-07 | End: 2024-01-23

## 2023-12-14 RX ORDER — LISINOPRIL 20 MG/1
20 TABLET ORAL DAILY
Qty: 100 TABLET | Refills: 1 | Status: SHIPPED | OUTPATIENT
Start: 2023-12-14 | End: 2024-02-21 | Stop reason: SDUPTHER

## 2023-12-14 RX ORDER — TRIAMCINOLONE ACETONIDE 40 MG/ML
INJECTION, SUSPENSION INTRA-ARTICULAR; INTRAMUSCULAR
COMMUNITY
End: 2023-12-14

## 2023-12-14 ASSESSMENT — FIBROSIS 4 INDEX: FIB4 SCORE: 2.04

## 2023-12-14 NOTE — ASSESSMENT & PLAN NOTE
Right ankle is starting to bother her a little bit as well as left ankle.  She saw Dr. Yee and only last about a year ago.  Had a cortisone injection done for the right ankle but she does not remember the procedure or whether or not it was helpful.  Reviewing his notes, was considering ankle arthroscopy with hardware removal if needed.  She also reports difficulty with balance, is requesting a shower chair.

## 2023-12-14 NOTE — ASSESSMENT & PLAN NOTE
She reports having a hyperpigmented patch of skin on the bridge of her nose for many years after she did dermabrasion roller.  However recently it is started to become ulcerated in the center and has not been healing.  Denies rubbing in this area from her glasses or her BiPAP mask.  Area is not painful.

## 2023-12-14 NOTE — ASSESSMENT & PLAN NOTE
Was prescribed alendronate again by [facility] after her repeat DEXA scan showed an 8.5% loss in bone density compared to 2018.  She reports forgetting to get refills but she is agreeable to taking it.

## 2023-12-14 NOTE — ASSESSMENT & PLAN NOTE
Has been checking blood pressure at home and getting readings in the 150s over 90s.  In clinic today she is at 156/80.  Taking lisinopril 10 mg daily.

## 2023-12-14 NOTE — PROGRESS NOTES
Subjective:   Kristen Carrera is a 73 y.o. female here today for skin lesion, f/u HTN, ankle pain    Skin lesion  She reports having a hyperpigmented patch of skin on the bridge of her nose for many years after she did dermabrasion roller.  However recently it is started to become ulcerated in the center and has not been healing.  Denies rubbing in this area from her glasses or her BiPAP mask.  Area is not painful.      Osteoporosis  Was prescribed alendronate again by [facility] after her repeat DEXA scan showed an 8.5% loss in bone density compared to 2018.  She reports forgetting to get refills but she is agreeable to taking it.    Primary hypertension  Has been checking blood pressure at home and getting readings in the 150s over 90s.  In clinic today she is at 156/80.  Taking lisinopril 10 mg daily.    H/O fracture of ankle  Right ankle is starting to bother her a little bit as well as left ankle.  She saw Dr. Yee and only last about a year ago.  Had a cortisone injection done for the right ankle but she does not remember the procedure or whether or not it was helpful.  Reviewing his notes, was considering ankle arthroscopy with hardware removal if needed.  She also reports difficulty with balance, is requesting a shower chair.       Current medicines (including changes today)  Current Outpatient Medications   Medication Sig Dispense Refill    rosuvastatin (CRESTOR) 20 MG Tab Take 20 mg by mouth every evening.      lisinopril (PRINIVIL) 20 MG Tab Take 1 Tablet by mouth every day. 100 Tablet 1    amphetamine-dextroamphetamine ER (ADDERALL XR) 30 MG XR capsule Take 1 Capsule by mouth every morning for 30 days. 30 Capsule 0    alendronate (FOSAMAX) 70 MG Tab Take 1 Tablet by mouth every 7 days. 12 Tablet 3    traZODone (DESYREL) 100 MG Tab Take 1.5 Tablets by mouth at bedtime as needed for Sleep. 135 Tablet 1    aspirin 81 MG EC tablet Take 1 Tablet by mouth every day. 100 Tablet 1    MAGNESIUM PO        tolterodine ER (DETROL LA) 4 MG CAPSULE SR 24 HR       Calcium Carbonate-Vitamin D (CALCIUM-D PO) Take 2 Tablets by mouth every day.       No current facility-administered medications for this visit.     She  has a past medical history of ADD (attention deficit disorder), Anxiety, Arthritis, CTS (carpal tunnel syndrome), Depression, History of abdominoplasty (3/15/2012), Hyperlipidemia, Hypothyroidism, Midline low back pain without sciatica (8/4/2015), Osteoporosis (8/19/2014), and PATENT FORAMEN OVALE LEFT TO RIGHT SHUNT (12/8/2010).         Objective:     Vitals:    12/14/23 1115   BP: (!) 156/80   Pulse: 100   Resp: 16   Temp: 36.7 °C (98.1 °F)   SpO2: 95%     Body mass index is 24.69 kg/m².   Physical Exam:  Constitutional: Alert, no distress.  Skin: Warm, dry, good turgor, no rashes in visible areas, approx 5 mm hyperpigmented lesion with small central ulceration on bridge of nose.  Eye: Equal, round and reactive, conjunctiva clear, lids normal.  Psych: Alert and oriented x3, normal affect and mood.      Assessment and Plan:   The following treatment plan was discussed    1. Primary hypertension  Uncontrolled.  Will uptitrate lisinopril.  Follow-up in 4 weeks with home blood pressure recordings  - lisinopril (PRINIVIL) 20 MG Tab; Take 1 Tablet by mouth every day.  Dispense: 100 Tablet; Refill: 1    2. Skin lesion  Given central area of the lesion that is nonhealing, would like to have it reviewed by dermatology to determine whether biopsy is warranted.  Referral placed.  - Referral to Dermatology    3. Osteoporosis, unspecified osteoporosis type, unspecified pathological fracture presence  Discussed importance of taking Fosamax regularly.  She has refills and she is willing to restart    4. H/O fracture of ankle  With some worsening symptoms.  Prescription given for shower chair for care chest.  Advised her to follow-up with her orthopedist.        Followup: Return in about 4 weeks (around 1/11/2024) for  hypertension.

## 2023-12-19 DIAGNOSIS — G47.10 HYPERSOMNIA: ICD-10-CM

## 2023-12-19 DIAGNOSIS — F90.0 ATTENTION DEFICIT HYPERACTIVITY DISORDER (ADHD), PREDOMINANTLY INATTENTIVE TYPE: ICD-10-CM

## 2023-12-19 RX ORDER — DEXTROAMPHETAMINE SACCHARATE, AMPHETAMINE ASPARTATE MONOHYDRATE, DEXTROAMPHETAMINE SULFATE AND AMPHETAMINE SULFATE 7.5; 7.5; 7.5; 7.5 MG/1; MG/1; MG/1; MG/1
30 CAPSULE, EXTENDED RELEASE ORAL EVERY MORNING
Qty: 30 CAPSULE | Refills: 0 | Status: SHIPPED | OUTPATIENT
Start: 2023-12-19 | End: 2024-01-22 | Stop reason: SDUPTHER

## 2023-12-26 ENCOUNTER — APPOINTMENT (OUTPATIENT)
Dept: BEHAVIORAL HEALTH | Facility: CLINIC | Age: 73
End: 2023-12-26
Payer: MEDICARE

## 2024-01-04 ENCOUNTER — TELEMEDICINE (OUTPATIENT)
Dept: BEHAVIORAL HEALTH | Facility: CLINIC | Age: 74
End: 2024-01-04
Payer: MEDICARE

## 2024-01-04 DIAGNOSIS — F90.2 ATTENTION DEFICIT HYPERACTIVITY DISORDER (ADHD), COMBINED TYPE: ICD-10-CM

## 2024-01-04 DIAGNOSIS — F33.2 SEVERE RECURRENT MAJOR DEPRESSION WITHOUT PSYCHOTIC FEATURES (HCC): ICD-10-CM

## 2024-01-04 PROCEDURE — 99214 OFFICE O/P EST MOD 30 MIN: CPT | Mod: 95 | Performed by: PSYCHIATRY & NEUROLOGY

## 2024-01-04 PROCEDURE — 90833 PSYTX W PT W E/M 30 MIN: CPT | Mod: 95 | Performed by: PSYCHIATRY & NEUROLOGY

## 2024-01-04 ASSESSMENT — PATIENT HEALTH QUESTIONNAIRE - PHQ9
5. POOR APPETITE OR OVEREATING: 3 - NEARLY EVERY DAY
CLINICAL INTERPRETATION OF PHQ2 SCORE: 6
SUM OF ALL RESPONSES TO PHQ QUESTIONS 1-9: 19
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
## 2024-01-08 PROBLEM — Z87.81 H/O FRACTURE OF ANKLE: Status: RESOLVED | Noted: 2022-09-09 | Resolved: 2024-01-08

## 2024-01-08 ASSESSMENT — PATIENT HEALTH QUESTIONNAIRE - PHQ9
SUM OF ALL RESPONSES TO PHQ QUESTIONS 1-9: 9
5. POOR APPETITE OR OVEREATING: 1 - SEVERAL DAYS
CLINICAL INTERPRETATION OF PHQ2 SCORE: 4

## 2024-01-08 ASSESSMENT — ACTIVITIES OF DAILY LIVING (ADL): BATHING_REQUIRES_ASSISTANCE: 0

## 2024-01-08 ASSESSMENT — ENCOUNTER SYMPTOMS: GENERAL WELL-BEING: GOOD

## 2024-01-09 ENCOUNTER — OFFICE VISIT (OUTPATIENT)
Dept: BEHAVIORAL HEALTH | Facility: CLINIC | Age: 74
End: 2024-01-09
Payer: MEDICARE

## 2024-01-09 DIAGNOSIS — F90.2 ATTENTION DEFICIT HYPERACTIVITY DISORDER (ADHD), COMBINED TYPE: ICD-10-CM

## 2024-01-09 DIAGNOSIS — F33.2 SEVERE RECURRENT MAJOR DEPRESSION WITHOUT PSYCHOTIC FEATURES (HCC): ICD-10-CM

## 2024-01-09 PROCEDURE — 90834 PSYTX W PT 45 MINUTES: CPT

## 2024-01-09 NOTE — PROGRESS NOTES
[facility]   Therapy Progress Note      Therapy was provided on this date in coordination with the [facility] approved Clinical Supervisor under the direct supervision of Dr. Chang who was on site during this visit.    Therapist reviewed informed consent, limits of confidentiality and [facility] policies; patient expressed understanding and agreed to voluntarily proceed with evaluation and treatment.    Name: Kristen Carrera, prefers to go by [place]  MRN: [id number]  : 1950  Age: 73 y.o.  Date of assessment: 2024  PCP: Binta To M.D.  Persons in attendance: Patient and MARYANNE Foreman-Intern  Total session time: 50 minutes      Topics addressed in psychotherapy include:     Client reported still experiencing consistent lack of motivation to get out of the apartment. PHQ9 screening from 2024 shows score of 19 (Moderately severe depression). Discussed previous motivations, such as visiting the grandbabies, and client reported feelings of frustration that have decreased the impact of that source of motivation for her because she will visit and try to promote structure and rules that are undermined when she is not there. Client also reported that she does not feel like she is isolated, doesn't mind being home by herself and likes to pass time by crafting, which she has been consistently engaging in. Client has not organized her apartment any further, still needs to apply for assistance with cleaning, but has been able to maintain previous organization efforts. Client did express some feelings of exclusion, mentioning a couple of scenarios where she found out about family engagements after the fact and felt left out. Client strongly values her relationships with her grandkids as they appear to be a consistent source of gordy. Discussed making plans with her grandsons to consistently go out and do something fun once or twice a month so she has some motivation to get out of  her apartment. Client was in agreement, believes they will be more than willing to spend time with her. Will continue to provide emotional support and validation.       Objective Observations:   Participation:Verbally monopolizing, Attentive, Engaged, and Open to feedback   Grooming:Casual   Cognition:Alert and Fully Oriented   Eye Contact:Good   Mood:Depressed and Anxious   Affect:Sad and Anxious   Thought Process:Tangential   Speech:Rate within normal limits and Volume within normal limits    Current Risk:   Suicide:  Not indicated   Homicide:  Not indicated   Self-Harm:  Not indicated    Relapse:  Not indicated    Safety Plan Reviewed: not applicable        Diagnosis:  1. Severe recurrent major depression without psychotic features (HCC)    2. Attention deficit hyperactivity disorder (ADHD), combined type            Frances Grace, ANGIE, CSW-Intern

## 2024-01-10 ENCOUNTER — OFFICE VISIT (OUTPATIENT)
Dept: FAMILY PLANNING/WOMEN'S HEALTH CLINIC | Facility: PHYSICIAN GROUP | Age: 74
End: 2024-01-10
Payer: MEDICARE

## 2024-01-10 VITALS
DIASTOLIC BLOOD PRESSURE: 68 MMHG | SYSTOLIC BLOOD PRESSURE: 132 MMHG | WEIGHT: 132 LBS | HEIGHT: 62 IN | BODY MASS INDEX: 24.29 KG/M2

## 2024-01-10 DIAGNOSIS — G47.00 INSOMNIA, UNSPECIFIED TYPE: ICD-10-CM

## 2024-01-10 DIAGNOSIS — F33.0 MILD EPISODE OF RECURRENT MAJOR DEPRESSIVE DISORDER (HCC): ICD-10-CM

## 2024-01-10 DIAGNOSIS — M81.0 OSTEOPOROSIS, UNSPECIFIED OSTEOPOROSIS TYPE, UNSPECIFIED PATHOLOGICAL FRACTURE PRESENCE: ICD-10-CM

## 2024-01-10 DIAGNOSIS — Z12.11 ENCOUNTER FOR COLORECTAL CANCER SCREENING: ICD-10-CM

## 2024-01-10 DIAGNOSIS — F33.41 RECURRENT MAJOR DEPRESSIVE DISORDER, IN PARTIAL REMISSION (HCC): ICD-10-CM

## 2024-01-10 DIAGNOSIS — I10 PRIMARY HYPERTENSION: ICD-10-CM

## 2024-01-10 DIAGNOSIS — N39.41 URGE INCONTINENCE OF URINE: ICD-10-CM

## 2024-01-10 DIAGNOSIS — Z12.12 ENCOUNTER FOR COLORECTAL CANCER SCREENING: ICD-10-CM

## 2024-01-10 DIAGNOSIS — F15.11 HISTORY OF METHAMPHETAMINE ABUSE (HCC): ICD-10-CM

## 2024-01-10 DIAGNOSIS — G47.33 OSA (OBSTRUCTIVE SLEEP APNEA): ICD-10-CM

## 2024-01-10 DIAGNOSIS — G47.10 HYPERSOMNIA: ICD-10-CM

## 2024-01-10 DIAGNOSIS — R41.3 COMPLAINTS OF MEMORY DISTURBANCE: ICD-10-CM

## 2024-01-10 DIAGNOSIS — E78.5 HYPERLIPIDEMIA, UNSPECIFIED HYPERLIPIDEMIA TYPE: ICD-10-CM

## 2024-01-10 DIAGNOSIS — Z59.86 FINANCIAL INSECURITY: ICD-10-CM

## 2024-01-10 DIAGNOSIS — Z91.81 RISK FOR FALLS: ICD-10-CM

## 2024-01-10 PROCEDURE — 3075F SYST BP GE 130 - 139MM HG: CPT

## 2024-01-10 PROCEDURE — 3078F DIAST BP <80 MM HG: CPT

## 2024-01-10 PROCEDURE — G0439 PPPS, SUBSEQ VISIT: HCPCS

## 2024-01-10 PROCEDURE — 1125F AMNT PAIN NOTED PAIN PRSNT: CPT

## 2024-01-10 SDOH — ECONOMIC STABILITY: INCOME INSECURITY: IN THE PAST 12 MONTHS, HAS THE ELECTRIC, GAS, OIL, OR WATER COMPANY THREATENED TO SHUT OFF SERVICE IN YOUR HOME?: NO

## 2024-01-10 SDOH — ECONOMIC STABILITY: FOOD INSECURITY: WITHIN THE PAST 12 MONTHS, THE FOOD YOU BOUGHT JUST DIDN'T LAST AND YOU DIDN'T HAVE MONEY TO GET MORE.: NEVER TRUE

## 2024-01-10 SDOH — HEALTH STABILITY: MENTAL HEALTH: HOW OFTEN DO YOU HAVE 6 OR MORE DRINKS ON ONE OCCASION?: NEVER

## 2024-01-10 SDOH — HEALTH STABILITY: MENTAL HEALTH
STRESS IS WHEN SOMEONE FEELS TENSE, NERVOUS, ANXIOUS, OR CAN'T SLEEP AT NIGHT BECAUSE THEIR MIND IS TROUBLED. HOW STRESSED ARE YOU?: NOT AT ALL

## 2024-01-10 SDOH — ECONOMIC STABILITY: INCOME INSECURITY: IN THE LAST 12 MONTHS, WAS THERE A TIME WHEN YOU WERE NOT ABLE TO PAY THE MORTGAGE OR RENT ON TIME?: NO

## 2024-01-10 SDOH — SOCIAL STABILITY: SOCIAL NETWORK
DO YOU BELONG TO ANY CLUBS OR ORGANIZATIONS SUCH AS CHURCH GROUPS UNIONS, FRATERNAL OR ATHLETIC GROUPS, OR SCHOOL GROUPS?: NO

## 2024-01-10 SDOH — SOCIAL STABILITY: SOCIAL NETWORK: HOW OFTEN DO YOU GET TOGETHER WITH FRIENDS OR RELATIVES?: NEVER

## 2024-01-10 SDOH — SOCIAL STABILITY: SOCIAL NETWORK: ARE YOU MARRIED, WIDOWED, DIVORCED, SEPARATED, NEVER MARRIED, OR LIVING WITH A PARTNER?: DIVORCED

## 2024-01-10 SDOH — HEALTH STABILITY: MENTAL HEALTH: HOW MANY STANDARD DRINKS CONTAINING ALCOHOL DO YOU HAVE ON A TYPICAL DAY?: PATIENT DOES NOT DRINK

## 2024-01-10 SDOH — ECONOMIC STABILITY: FOOD INSECURITY: WITHIN THE PAST 12 MONTHS, YOU WORRIED THAT YOUR FOOD WOULD RUN OUT BEFORE YOU GOT MONEY TO BUY MORE.: NEVER TRUE

## 2024-01-10 SDOH — ECONOMIC STABILITY: HOUSING INSECURITY: IN THE LAST 12 MONTHS, HOW MANY PLACES HAVE YOU LIVED?: 1

## 2024-01-10 SDOH — HEALTH STABILITY: MENTAL HEALTH: HOW OFTEN DO YOU HAVE A DRINK CONTAINING ALCOHOL?: NEVER

## 2024-01-10 SDOH — ECONOMIC STABILITY: INCOME INSECURITY: HOW HARD IS IT FOR YOU TO PAY FOR THE VERY BASICS LIKE FOOD, HOUSING, MEDICAL CARE, AND HEATING?: NOT HARD AT ALL

## 2024-01-10 SDOH — SOCIAL STABILITY: SOCIAL NETWORK: HOW OFTEN DO YOU ATTENT MEETINGS OF THE CLUB OR ORGANIZATION YOU BELONG TO?: NEVER

## 2024-01-10 SDOH — ECONOMIC STABILITY - INCOME SECURITY: FINANCIAL INSECURITY: Z59.86

## 2024-01-10 SDOH — SOCIAL STABILITY: SOCIAL NETWORK: HOW OFTEN DO YOU ATTEND CHURCH OR RELIGIOUS SERVICES?: NEVER

## 2024-01-10 SDOH — SOCIAL STABILITY: SOCIAL NETWORK: IN A TYPICAL WEEK, HOW MANY TIMES DO YOU TALK ON THE PHONE WITH FAMILY, FRIENDS, OR NEIGHBORS?: ONCE A WEEK

## 2024-01-10 ASSESSMENT — PAIN SCALES - GENERAL: PAINLEVEL: 8=MODERATE-SEVERE PAIN

## 2024-01-10 ASSESSMENT — FIBROSIS 4 INDEX: FIB4 SCORE: 2.04

## 2024-01-10 ASSESSMENT — LIFESTYLE VARIABLES
SKIP TO QUESTIONS 9-10: 1
AUDIT-C TOTAL SCORE: 0

## 2024-01-10 NOTE — PROGRESS NOTES
Comprehensive Health Assessment Program     Kristen Carrera is a 73 y.o. here for her comprehensive health assessment.    Patient Active Problem List    Diagnosis Date Noted    Risk for falls 01/10/2024    Skin lesion 12/14/2023    Complaints of memory disturbance 07/03/2023    History of alcohol abuse 07/03/2023    Major depressive disorder in partial remission (HCC) 07/03/2023    History of methamphetamine abuse (HCC) 06/29/2023    H/O alcohol abuse 06/29/2023    Vision loss, bilateral 02/02/2023    Severe MAX (obstructive sleep apnea) 07/26/2022    Urinary incontinence 10/28/2021    Primary hypertension 10/18/2018    Insomnia 09/28/2018    Midline low back pain without sciatica 08/04/2015    Osteoporosis 08/19/2014    Hypersomnolence 01/17/2012    PATENT FORAMEN OVALE LEFT TO RIGHT SHUNT txed w/ asa- has seen cardio dr salcedo 12/08/2010    Herpes, genital 10/12/2010    ADD (attention deficit disorder)     Hyperlipidemia        Current Outpatient Medications   Medication Sig Dispense Refill    amphetamine-dextroamphetamine ER (ADDERALL XR) 30 MG XR capsule Take 1 Capsule by mouth every morning for 30 days. 30 Capsule 0    rosuvastatin (CRESTOR) 20 MG Tab Take 20 mg by mouth every evening.      lisinopril (PRINIVIL) 20 MG Tab Take 1 Tablet by mouth every day. 100 Tablet 1    alendronate (FOSAMAX) 70 MG Tab Take 1 Tablet by mouth every 7 days. 12 Tablet 3    traZODone (DESYREL) 100 MG Tab Take 1.5 Tablets by mouth at bedtime as needed for Sleep. 135 Tablet 1    aspirin 81 MG EC tablet Take 1 Tablet by mouth every day. 100 Tablet 1    MAGNESIUM PO       tolterodine ER (DETROL LA) 4 MG CAPSULE SR 24 HR       Calcium Carbonate-Vitamin D (CALCIUM-D PO) Take 2 Tablets by mouth every day.       No current facility-administered medications for this visit.          Current supplements as per medication list.     Allergies:   Hydrocodone and Hydrocodone-acetaminophen  Social History     Tobacco Use    Smoking status:  Never    Smokeless tobacco: Never   Vaping Use    Vaping Use: Never used   Substance Use Topics    Alcohol use: Yes     Comment: rare    Drug use: Yes     Types: Marijuana     Comment: marijuana several times a year, H/O METHAMPHETAMINE FOR 17 YRS.  QUIT 2008.     Family History   Problem Relation Age of Onset    Hypertension Mother     Cancer Mother         UTERUS    Heart Disease Mother         Arteriosclerotic Cardiovascular Disease    Other Mother         Coronary Artery Bypass Graft    Psychiatric Illness Mother     Arthritis Mother     Cancer Father         PANCREATIC    Cancer Sister 51        BREAST    Breast Cancer Sister     Stroke Neg Hx     Diabetes Neg Hx     Lung Disease Neg Hx      Kristen  has a past medical history of ADD (attention deficit disorder), Anxiety, Arthritis, CTS (carpal tunnel syndrome), Depression, Fatigue (11/08/2011), H/O fracture of ankle (09/09/2022), History of abdominoplasty (03/15/2012), History of hypothyroidism, Hyperlipidemia, Hypothyroidism, Midline low back pain without sciatica (08/04/2015), Moderate episode of recurrent major depressive disorder (HCC) (10/06/2015), Osteoporosis (08/19/2014), and PATENT FORAMEN OVALE LEFT TO RIGHT SHUNT (12/08/2010).   Past Surgical History:   Procedure Laterality Date    PB OPEN TX TRIMALLEOLAR ANKLE FX W/O FIX PST LIP Right 9/12/2022    Procedure: RIGHT TRIMALLEOLAR OPEN REDUCTION INTERNAL FIXATION, RIGHT SYNDESMOSIS OPEN REDUCTION INTERNAL FIXATION;  Surgeon: Trey Conway M.D.;  Location: [facility] [place];  Service: Orthopedics    ABDOMINOPLASTY  1977    APPENDECTOMY      OTHER      TUMMY TUCK, BREAST REDUCTION, LIPOSUCTION OF HIPS AND THIGHS.    PB MAMMARY DUCTOGRAM, SINGLE      DC ANESTH,LOLA HYST FOL NEURAXIAL ANAL/ANES      DC BREAST REDUCTION      TONSILLECTOMY      TUBAL COAGULATION LAPAROSCOPIC BILATERAL         Screening:  In the last six months have you experienced any leakage of urine? Yes, currently  taking tolterodine ER 4 mg daily. Reports daily use of panty liners. Followed by urology.    Depression Screening  Little interest or pleasure in doing things?  2 - more than half the days  Feeling down, depressed , or hopeless? 2 - more than half the days  Trouble falling or staying asleep, or sleeping too much?  0 - not at all  Feeling tired or having little energy?  1 - several days  Poor appetite or overeating?  1 - several days  Feeling bad about yourself - or that you are a failure or have let yourself or your family down? 1 - several days  Trouble concentrating on things, such as reading the newspaper or watching television? 2 - more than half the days  Moving or speaking so slowly that other people could have noticed.  Or the opposite - being so fidgety or restless that you have been moving around a lot more than usual?  0 - not at all  Thoughts that you would be better off dead, or of hurting yourself?  0 - not at all  Patient Health Questionnaire Score: 9    If depressive symptoms identified deferred to follow up visit unless specifically addressed in assessment and plan.    Interpretation of PHQ-9 Total Score   Score Severity   1-4 No Depression   5-9 Mild Depression   10-14 Moderate Depression   15-19 Moderately Severe Depression   20-27 Severe Depression    Screening for Cognitive Impairment  Do you or any of your friends or family members have any concern about your memory? No  Three Minute Recall (Banana, Sunrise, Chair) 1/3    Otf clock face with all 12 numbers and set the hands to show 20 past 8.  Yes    Cognitive concerns identified deferred for follow up unless specifically addressed in assessment and plan.  Referral placed to [facility] for memory disturbance.    Fall Risk Assessment  Has the patient had two or more falls in the last year or any fall with injury in the last year?  Yes    Safety Assessment  Do you always wear your seatbelt?  Yes  Any changes to home needed to  function safely? No  Difficulty hearing.  Yes  Patient counseled about all safety risks that were identified.    Functional Assessment ADLs  Are there any barriers preventing you from cooking for yourself or meeting nutritional needs?  No.    Are there any barriers preventing you from driving safely or obtaining transportation?  No.    Are there any barriers preventing you from using a telephone or calling for help?  No    Are there any barriers preventing you from shopping?  No.    Are there any barriers preventing you from taking care of your own finances?  No    Are there any barriers preventing you from managing your medications?  No    Are there any barriers preventing you from showering, bathing or dressing yourself? No Problems with balance  Are there any barriers preventing you from doing housework or laundry? Yes Lives on the 3rd floor. Elevator doesn't work at apartments.  Are there any barriers preventing you from using the toilet?No    Are you currently engaging in any exercise or physical activity?  No.      Self-Assessment of Health  What is your perception of your health? Good    Do you sleep more than six hours a night? Yes    In the past 7 days, how much did pain keep you from doing your normal work? None    Do you spend quality time with family or friends (virtually or in person)? No Not as much as she used to  Do you usually eat a heart healthy diet that constists of a variety of fruits, vegetables, whole grains and fiber? Yes    Do you eat foods high in fat and/or Fast Food more than three times per week? No    How concerned are you that your medical conditions are not being well managed? Not at all    Are you worried that in the next 2 months, you may not have stable housing that you own, rent, or stay in as part of a household? No        Advance Care Planning  Do you have an Advance Directive, Living Will, Durable Power of , or POLST? No Provided patient with educational brochure  regarding Advance Care Planning.                    Health Maintenance Summary            Overdue - COVID-19 Vaccine (1) Overdue - never done      No completion history exists for this topic.              Overdue - Hepatitis A Vaccine (Hep A) (1 of 2 - Risk 2-dose series) Overdue - never done      No completion history exists for this topic.              Overdue - Zoster (Shingles) Vaccines (1 of 2) Overdue - never done      No completion history exists for this topic.              Ordered - Colorectal Cancer Screening (Colonoscopy - Every 10 Years) Ordered on 1/10/2024      11/23/2012  Colonoscopy (Previously completed)    01/19/2012  OCCULT BLOOD X3 (STOOL)              Overdue - Influenza Vaccine (1) Overdue - never done      No completion history exists for this topic.              Annual Wellness Visit (Every 366 Days) Next due on 1/10/2025      01/10/2024  Level of Service: ANNUAL WELLNESS VISIT-INCLUDES PPPS SUBSEQUE*    01/09/2023  Level of Service: [facility] ANNUAL WELLNESS VISIT-INCLUDES PPPS SUBSEQUE*              Mammogram (Every 2 Years) Next due on 10/13/2025      10/13/2023  MA-SCREENING MAMMO BILAT W/IMPLANTS W/GARY W/CAD    08/23/2022  MA-SCREENING MAMMO BILAT W/IMPLANTS W/GARY W/CAD    09/11/2018  MA-MAMMO SCREEN BILAT IMPLANTS GARY CAD    02/06/2017  MA-MAMMO SCREEN BILAT IMPLANTS GARY CAD    10/19/2015  MA-SCREEN MAMMO W/IMPLANTS W/CAD-BILAT    Only the first 5 history entries have been loaded, but more history exists.              IMM DTaP/Tdap/Td Vaccine (2 - Td or Tdap) Next due on 9/5/2028 09/05/2018  Imm Admin: Tdap Vaccine              Bone Density Scan (Every 5 Years) Next due on 10/13/2028      10/13/2023  DS-BONE DENSITY STUDY (DEXA)    09/11/2018  DS-BONE DENSITY STUDY (DEXA)    03/05/2014  DS-BONE DENSITY STUDY (DEXA)              Pneumococcal Vaccine: 65+ Years (Series Information) Completed      09/05/2018  Imm Admin: Pneumococcal polysaccharide vaccine (PPSV-23)    10/06/2015  Imm  Admin: Pneumococcal Conjugate Vaccine (Prevnar/PCV-13)              Hepatitis C Screening  Tentatively Complete      2023  Hepatitis C Antibody component of HCV Scrn ( 4283-0117 1xLife)              Hepatitis B Vaccine (Hep B) (Series Information) Aged Out      No completion history exists for this topic.              HPV Vaccines (Series Information) Aged Out      No completion history exists for this topic.              Polio Vaccine (Inactivated Polio) (Series Information) Aged Out      No completion history exists for this topic.              Meningococcal Immunization (Series Information) Aged Out      No completion history exists for this topic.              Discontinued - Cervical Cancer Screening  Discontinued        Frequency changed to Never automatically (Topic No Longer Applies)    2014  Done    10/23/2011  Previously completed                    Patient Care Team:  Binta To M.D. as PCP - General (Internal Medicine)  Helena Cuevas M.D. (Inactive) as Consulting Physician (Rheumatology)  Harjit Abel N.P. (Nurse Practitioner Psychiatric/Mental Health)  Michele Li M.D. (Neurology)  Yesi Self P.A.-C. (Sleep Medicine)  TOSHA RichmondRBONY (Nurse Practitioner)  isleep as Respiratory Therapist (DME Supplier)    Financial Resource Strain: Low Risk  (1/10/2024)    Overall Financial Resource Strain (CARDIA)     Difficulty of Paying Living Expenses: Not hard at all   Recent Concern: Financial Resource Strain - High Risk (2023)    Overall Financial Resource Strain (CARDIA)     Difficulty of Paying Living Expenses: Hard      Transportation Needs: No Transportation Needs (1/10/2024)    PRAPARE - Transportation     Lack of Transportation (Medical): No     Lack of Transportation (Non-Medical): No      Food Insecurity: No Food Insecurity (1/10/2024)    Hunger Vital Sign     Worried About Running Out of Food in the Last Year: Never true     Ran Out of Food  "in the Last Year: Never true   Recent Concern: Food Insecurity - Food Insecurity Present (11/9/2023)    Hunger Vital Sign     Worried About Running Out of Food in the Last Year: Sometimes true     Ran Out of Food in the Last Year: Never true        Encounter Vitals  Blood Pressure : 132/68  O2 Delivery Device: BIPAP  Weight: 59.9 kg (132 lb)  Height: 157.5 cm (5' 2\")  BMI (Calculated): 24.14  Pain Score: 8=Moderate-Severe Pain  DME  O2 Delivery Device: BIPAP   BiPAP at night without oxygen     Alert, oriented in no acute distress.  Eye contact is good, speech goal directed, affect calm.    Assessment and Plan. The following treatment and monitoring plan is recommended:    History of methamphetamine abuse (HCC)  Stable. Reports that she used methamphetamine for 17 years and quit about 15 years ago. Denies other drug use.     Mild episode of recurrent depressive disorder (HCC)  Recurrent major depressive disorder, in partial remission (HCC)  Chronic, stable. No current medication. Followed by psychology weekly. Reports mood is good. No SI/HI.    Hyperlipidemia  Chronic, stable. Currently taking rosuvastatin 20 mg daily. She is not exercising regularly. Offered [facility] [employer] gym resources. Denies chest pain, claudication, and dizziness.    Hypersomnolence  Chronic, stable. Currently taking amphetamine-dextroamphetamine 30 mg XR daily. Reports that the medication gets her out of bed. Followed by psychiatry.     Insomnia  Chronic, stable. Currently taking trazodone 100 mg nightly for sleep. Reports that she is tolerating medication well without side effects. Denies excessive sedation and amnesia.    Osteoporosis  Chronic, stable. Currently taking alendronate 70 mg every 7 days. Reviewed DEXA scan from October 2023: \"according to the World Health Organization classification, bone mineral density of this patient is osteoporotic in the spine and osteopenic in the proximal left femur.\"    Primary hypertension  Chronic, " stable. Currently taking lisinopril 20 mg daily. Denies chest pain, lightheadedness, palpitations, and lower extremity edema.     Urinary incontinence  Chronic, stable. Currently taking tolterodine ER 4 mg daily, tolerating medication well. Reports that symptoms have improved with medication. Followed by urology.    Severe MAX (obstructive sleep apnea)  Chronic, stable. Currently on BiPAP, reports an average use of 10 hours nighty. Followed by sleep medicine, Ashwin ELLISON.    Complaints of memory disturbance  Chronic, ongoing. Reports years of memory disturbance that has worsened over the last year. Referral placed to [facility].    Risk for falls  Patient reports a few falls within the last year. Provided education on removing hazards from home including rugs and carpets, adequate lighting, and use of treaded slippers and socks. Denies need for assistive device or therapy at this time.     Financial Insecurity   Referral placed to case management for financial insecurity regarding new dentures.    Encounter for colorectal cancer screening  Cologuard ordered.      Services suggested: Referral to [facility]  Health Care Screening: Age-appropriate preventive services recommended by USPTF and ACIP covered by Medicare were discussed today. Services ordered if indicated and agreed upon by the patient.  Referrals offered: Community-based lifestyle interventions to reduce health risks and promote self-management and wellness, fall prevention, nutrition, physical activity, tobacco-use cessation, weight loss, and mental health services as per orders if indicated.    Discussion today about general wellness and lifestyle habits:    Prevent falls and reduce trip hazards; Cautioned about securing or removing rugs.  Have a working fire alarm and carbon monoxide detector.  Engage in regular physical activity and social activities.    Follow-up: Return for appointment with Primary Care  Provider as needed.

## 2024-01-10 NOTE — ASSESSMENT & PLAN NOTE
"Chronic, stable. Currently taking alendronate 70 mg every 7 days. Reviewed DEXA scan from October 2023: \"according to the World Health Organization classification, bone mineral density of this patient is osteoporotic in the spine and osteopenic in the proximal left femur.\"  "
Alex. Reports that she used for 17 years ago. Quit about 15 years ago.   
Chronic, ongoing. Reports years of memory disturbance, has worsened in the last year.   
Chronic, stable. Currently on BiPAP, reports an average use of 10 hours nighty. Followed by sleep medicine.    
Chronic, stable. Currently taking amphetamine-dextroamphetamine 30 mg XR daily.   
Chronic, stable. Currently taking amphetamine-dextroamphetamine 30 mg XR daily. Reports that the medication gets her out of bed. Followed by psychiatry.   
Chronic, stable. Currently taking lisinopril 20 mg daily. Denies chest pain, lightheadedness, palpitations, and lower extremity edema.     
Chronic, stable. Currently taking rosuvastatin 20 mg daily. Reports diet is good; appetite has been diminished over the last 6 months. Reports that she occasional forgets to eat but has Meals on Wheels. She is not exercising regularly. Denies chest pain, claudication, and dizziness.    
Chronic, stable. Currently taking tolterodine ER 4 mg daily, tolerating medication well. Reports that symptoms have improved with medication. Followed by urology.  
Chronic, stable. No current medication. Followed by psychology weekly. Reports mood is good. No SI/HI.  
Patient reports a few falls within the last year. Provided education on removing hazards from home including rugs and carpets, adequate lighting, and use of treaded slippers and socks.     
Rarely drinks alcohol.  
Stable. Currently taking trazodone 100 mg nightly for sleep. Reports that she is tolerating medication well without side effects. Denies excessive sedation and amnesia.    
97

## 2024-01-12 ENCOUNTER — PATIENT OUTREACH (OUTPATIENT)
Dept: HEALTH INFORMATION MANAGEMENT | Facility: OTHER | Age: 74
End: 2024-01-12
Payer: MEDICARE
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
## 2024-01-21 DIAGNOSIS — E78.5 HYPERLIPIDEMIA, UNSPECIFIED HYPERLIPIDEMIA TYPE: ICD-10-CM

## 2024-01-22 DIAGNOSIS — G47.10 HYPERSOMNIA: ICD-10-CM

## 2024-01-22 DIAGNOSIS — F90.0 ATTENTION DEFICIT HYPERACTIVITY DISORDER (ADHD), PREDOMINANTLY INATTENTIVE TYPE: ICD-10-CM

## 2024-01-22 RX ORDER — DEXTROAMPHETAMINE SACCHARATE, AMPHETAMINE ASPARTATE MONOHYDRATE, DEXTROAMPHETAMINE SULFATE AND AMPHETAMINE SULFATE 7.5; 7.5; 7.5; 7.5 MG/1; MG/1; MG/1; MG/1
30 CAPSULE, EXTENDED RELEASE ORAL EVERY MORNING
Qty: 30 CAPSULE | Refills: 0 | Status: SHIPPED | OUTPATIENT
Start: 2024-01-22 | End: 2024-02-22 | Stop reason: SDUPTHER

## 2024-01-22 NOTE — TELEPHONE ENCOUNTER
Received request via: Patient    Was the patient seen in the last year in this department? Yes    Does the patient have an active prescription (recently filled or refills available) for medication(s) requested? No    Pharmacy Name: [facility]     Does the patient have [facility] and need 100 day supply (blood pressure, diabetes and cholesterol meds only)? Medication is not for cholesterol, blood pressure or diabetes

## 2024-01-22 NOTE — TELEPHONE ENCOUNTER
Received request via: Pharmacy    Was the patient seen in the last year in this department? Yes    Does the patient have an active prescription (recently filled or refills available) for medication(s) requested? No    Pharmacy Name: [facility] [address]    Does the patient have [facility] Plus and need 100 day supply (blood pressure, diabetes and cholesterol meds only)? Yes, quantity updated to 100 days

## 2024-01-23 ENCOUNTER — HOSPITAL ENCOUNTER (OUTPATIENT)
Dept: LAB | Facility: MEDICAL CENTER | Age: 74
End: 2024-01-23
Attending: STUDENT IN AN ORGANIZED HEALTH CARE EDUCATION/TRAINING PROGRAM
Payer: MEDICARE

## 2024-01-23 ENCOUNTER — OFFICE VISIT (OUTPATIENT)
Dept: BEHAVIORAL HEALTH | Facility: CLINIC | Age: 74
End: 2024-01-23
Payer: MEDICARE

## 2024-01-23 DIAGNOSIS — F33.41 RECURRENT MAJOR DEPRESSIVE DISORDER, IN PARTIAL REMISSION (HCC): ICD-10-CM

## 2024-01-23 DIAGNOSIS — F90.0 ATTENTION DEFICIT HYPERACTIVITY DISORDER (ADHD), PREDOMINANTLY INATTENTIVE TYPE: ICD-10-CM

## 2024-01-23 PROCEDURE — 90834 PSYTX W PT 45 MINUTES: CPT

## 2024-01-23 PROCEDURE — 87086 URINE CULTURE/COLONY COUNT: CPT

## 2024-01-23 RX ORDER — ROSUVASTATIN CALCIUM 20 MG/1
20 TABLET, COATED ORAL EVERY EVENING
Qty: 100 TABLET | Refills: 3 | Status: SHIPPED | OUTPATIENT
Start: 2024-01-23
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
## 2024-01-25 LAB
BACTERIA UR CULT: NORMAL
SIGNIFICANT IND 70042: NORMAL
SITE SITE: NORMAL
SOURCE SOURCE: NORMAL

## 2024-02-06 ENCOUNTER — TELEMEDICINE (OUTPATIENT)
Dept: BEHAVIORAL HEALTH | Facility: CLINIC | Age: 74
End: 2024-02-06
Payer: MEDICARE

## 2024-02-06 DIAGNOSIS — F90.2 ATTENTION DEFICIT HYPERACTIVITY DISORDER (ADHD), COMBINED TYPE: ICD-10-CM

## 2024-02-06 DIAGNOSIS — F33.0 MDD (MAJOR DEPRESSIVE DISORDER), RECURRENT EPISODE, MILD (HCC): ICD-10-CM

## 2024-02-06 PROCEDURE — 90834 PSYTX W PT 45 MINUTES: CPT | Mod: 95

## 2024-02-06 NOTE — PROGRESS NOTES
"[facility]   Therapy Progress Note    Therapy was provided on this date in coordination with the [facility] approved Clinical Supervisor under the direct supervision of Dr. Chang who was on site during this visit.     Therapist reviewed informed consent, limits of confidentiality and [facility] policies; patient expressed understanding and agreed to voluntarily proceed with evaluation and treatment.    This visit was conducted via Zoom using secure and encrypted videoconferencing technology. The patient was in a private location in the [facility]. The patient's identity was confirmed and verbal consent was obtained for this virtual visit.  Place of Service: POS 10 -The patient is at Home during their visit in the [facility].    Name: CONCHITA CHAVEZ  MRN: [id number]  : 1950  Age: 73 y.o.  Date of assessment: 2024  PCP: Binta To M.D.  Persons in attendance: Patient and MARYANNE Foreman-Intern  Total session time: 50 minutes    Topics addressed in psychotherapy include:     Client has been child sitting great-grandson while daughter packs to move. Has a referral to [facility] to address memory concerns. Has been spending time with grandsons and great-grandsons but still struggling with self-motivation to get out of the apartment when there is not a need to do so. Discussed [employer] as an opportunity to sell some crafts and meet people. Client reported feeling like she has lost a sense of purpose, which contributes to lack of desire to leave her apartment more frequently \"Even with seeing the grand-babies, I love spending time with them but when it comes time to go I just don't feel like going\". Discussed identifying external sources of motivation. Client may talk to Jeffrey thakur, about taking her out to do [employer] once or twice a month and going for walks by the river twice a week. Client also identified volunteering once or twice a week as an " option, appeared excited with the idea of volunteering and working with kids. Will continue to provide support.     Objective Observations:   Participation:Active verbal participation, Attentive, Engaged, and Open to feedback   Grooming:Casual   Cognition:Alert and Fully Oriented   Eye Contact:Good   Mood:Depressed   Affect:Congruent with content   Thought Process:Circumstantial   Speech:Rate within normal limits and Volume within normal limits    Current Risk:   Suicide:  Not indicated   Homicide:  Not indicated   Self-Harm:  Not indicated   Relapse:  Not indicated   Safety Plan Reviewed: not applicable        Diagnosis:  1. MDD (major depressive disorder), recurrent episode, mild (HCC)    2. Attention deficit hyperactivity disorder (ADHD), combined type            Frances Grace, ANGIE, CSW-Intern

## 2024-02-16 ENCOUNTER — TELEPHONE (OUTPATIENT)
Dept: HEALTH INFORMATION MANAGEMENT | Facility: OTHER | Age: 74
End: 2024-02-16
Payer: MEDICARE

## 2024-02-20 ENCOUNTER — APPOINTMENT (OUTPATIENT)
Dept: DERMATOLOGY | Facility: IMAGING CENTER | Age: 74
End: 2024-02-20
Payer: MEDICARE

## 2024-02-20 ENCOUNTER — APPOINTMENT (OUTPATIENT)
Dept: BEHAVIORAL HEALTH | Facility: CLINIC | Age: 74
End: 2024-02-20
Payer: MEDICARE

## 2024-02-21 ENCOUNTER — OFFICE VISIT (OUTPATIENT)
Dept: MEDICAL GROUP | Facility: MEDICAL CENTER | Age: 74
End: 2024-02-21
Attending: INTERNAL MEDICINE
Payer: MEDICARE

## 2024-02-21 VITALS
BODY MASS INDEX: 25.03 KG/M2 | DIASTOLIC BLOOD PRESSURE: 80 MMHG | SYSTOLIC BLOOD PRESSURE: 128 MMHG | RESPIRATION RATE: 16 BRPM | OXYGEN SATURATION: 100 % | HEIGHT: 62 IN | TEMPERATURE: 97.3 F | HEART RATE: 86 BPM | WEIGHT: 136 LBS

## 2024-02-21 DIAGNOSIS — E78.5 HYPERLIPIDEMIA, UNSPECIFIED HYPERLIPIDEMIA TYPE: ICD-10-CM

## 2024-02-21 DIAGNOSIS — Z12.11 SCREEN FOR COLON CANCER: ICD-10-CM

## 2024-02-21 DIAGNOSIS — M81.0 OSTEOPOROSIS, UNSPECIFIED OSTEOPOROSIS TYPE, UNSPECIFIED PATHOLOGICAL FRACTURE PRESENCE: ICD-10-CM

## 2024-02-21 DIAGNOSIS — I10 PRIMARY HYPERTENSION: ICD-10-CM

## 2024-02-21 PROCEDURE — 99213 OFFICE O/P EST LOW 20 MIN: CPT | Performed by: INTERNAL MEDICINE

## 2024-02-21 PROCEDURE — 3074F SYST BP LT 130 MM HG: CPT | Performed by: INTERNAL MEDICINE

## 2024-02-21 PROCEDURE — 99214 OFFICE O/P EST MOD 30 MIN: CPT | Performed by: INTERNAL MEDICINE

## 2024-02-21 PROCEDURE — 3079F DIAST BP 80-89 MM HG: CPT | Performed by: INTERNAL MEDICINE

## 2024-02-21 RX ORDER — LISINOPRIL 20 MG/1
20 TABLET ORAL DAILY
Qty: 100 TABLET | Refills: 3 | Status: SHIPPED | OUTPATIENT
Start: 2024-02-21

## 2024-02-21 ASSESSMENT — FIBROSIS 4 INDEX: FIB4 SCORE: 2.04

## 2024-02-22 DIAGNOSIS — F90.0 ATTENTION DEFICIT HYPERACTIVITY DISORDER (ADHD), PREDOMINANTLY INATTENTIVE TYPE: ICD-10-CM

## 2024-02-22 DIAGNOSIS — G47.10 HYPERSOMNIA: ICD-10-CM

## 2024-02-22 RX ORDER — DEXTROAMPHETAMINE SACCHARATE, AMPHETAMINE ASPARTATE MONOHYDRATE, DEXTROAMPHETAMINE SULFATE AND AMPHETAMINE SULFATE 7.5; 7.5; 7.5; 7.5 MG/1; MG/1; MG/1; MG/1
30 CAPSULE, EXTENDED RELEASE ORAL EVERY MORNING
Qty: 30 CAPSULE | Refills: 0 | Status: SHIPPED | OUTPATIENT
Start: 2024-02-22 | End: 2024-03-19 | Stop reason: SDUPTHER

## 2024-02-22 NOTE — TELEPHONE ENCOUNTER
Received request via: Patient    Was the patient seen in the last year in this department? Yes    Does the patient have an active prescription (recently filled or refills available) for medication(s) requested? No    Pharmacy Name: [facility]    Does the patient have [facility] and need 100 day supply (blood pressure, diabetes and cholesterol meds only)? Medication is not for cholesterol, blood pressure or diabetes and Patient does not have SCP

## 2024-02-22 NOTE — PROGRESS NOTES
Subjective:   Kristen Carrera is a 73 y.o. female here today for f/u HTN    Primary hypertension  She presents today for follow-up hypertension.  Has not been checking blood pressure consistently at home however in clinic today she is at 128/80.  She continues on lisinopril 20 mg daily.    Osteoporosis  Reports she has been consistent with taking her alendronate since we last talked.       Current medicines (including changes today)  Current Outpatient Medications   Medication Sig Dispense Refill    lisinopril (PRINIVIL) 20 MG Tab Take 1 Tablet by mouth every day. 100 Tablet 3    rosuvastatin (CRESTOR) 20 MG Tab TAKE 1 TABLET BY MOUTH EVERY EVENING 100 Tablet 3    amphetamine-dextroamphetamine ER (ADDERALL XR) 30 MG XR capsule Take 1 Capsule by mouth every morning for 30 days. 30 Capsule 0    alendronate (FOSAMAX) 70 MG Tab Take 1 Tablet by mouth every 7 days. 12 Tablet 3    traZODone (DESYREL) 100 MG Tab Take 1.5 Tablets by mouth at bedtime as needed for Sleep. 135 Tablet 1    aspirin 81 MG EC tablet Take 1 Tablet by mouth every day. 100 Tablet 1    MAGNESIUM PO       tolterodine ER (DETROL LA) 4 MG CAPSULE SR 24 HR       Calcium Carbonate-Vitamin D (CALCIUM-D PO) Take 2 Tablets by mouth every day.       No current facility-administered medications for this visit.     She  has a past medical history of ADD (attention deficit disorder), Anxiety, Arthritis, CTS (carpal tunnel syndrome), Depression, Fatigue (11/08/2011), H/O fracture of ankle (09/09/2022), History of abdominoplasty (03/15/2012), History of hypothyroidism, Hyperlipidemia, Hypothyroidism, Midline low back pain without sciatica (08/04/2015), Moderate episode of recurrent major depressive disorder (HCC) (10/06/2015), Osteoporosis (08/19/2014), and PATENT FORAMEN OVALE LEFT TO RIGHT SHUNT (12/08/2010).         Objective:     Vitals:    02/21/24 1050   BP: 128/80   Pulse: 86   Resp: 16   Temp: 36.3 °C (97.3 °F)   SpO2: 100%     Body mass index is 24.87 kg/m².    Physical Exam:  Constitutional: Alert, no distress.  Skin: Warm, dry, good turgor, no rashes in visible areas.  Eye: Equal, round and reactive, conjunctiva clear, lids normal.  Psych: Alert and oriented x3, normal affect and mood.    Assessment and Plan:   The following treatment plan was discussed    1. Primary hypertension  Stable, well controlled with current medication which was refilled today  - lisinopril (PRINIVIL) 20 MG Tab; Take 1 Tablet by mouth every day.  Dispense: 100 Tablet; Refill: 3  - Comp Metabolic Panel; Future    2. Hyperlipidemia, unspecified hyperlipidemia type  Will updatel abs  - Lipid Profile; Future    3. Screen for colon cancer  - Referral to GI for Colonoscopy    4. Osteoporosis, unspecified osteoporosis type, unspecified pathological fracture presence  She is now taking the alendronate regularly.  She will continue supplemental calcium and vitamin D and we will obtain updated labs  -Continue Fosamax 70 mg weekly, daily calcium and vitamin D  - VITAMIN D,25 HYDROXY (DEFICIENCY); Future        Followup: Return in about 6 months (around 8/21/2024).

## 2024-02-22 NOTE — ASSESSMENT & PLAN NOTE
She presents today for follow-up hypertension.  Has not been checking blood pressure consistently at home however in clinic today she is at 128/80.  She continues on lisinopril 20 mg daily.

## 2024-02-26 ENCOUNTER — HOSPITAL ENCOUNTER (OUTPATIENT)
Dept: LAB | Facility: MEDICAL CENTER | Age: 74
End: 2024-02-26
Attending: INTERNAL MEDICINE
Payer: MEDICARE

## 2024-02-26 DIAGNOSIS — E78.5 HYPERLIPIDEMIA, UNSPECIFIED HYPERLIPIDEMIA TYPE: ICD-10-CM

## 2024-02-26 DIAGNOSIS — I10 PRIMARY HYPERTENSION: ICD-10-CM

## 2024-02-26 DIAGNOSIS — M81.0 OSTEOPOROSIS, UNSPECIFIED OSTEOPOROSIS TYPE, UNSPECIFIED PATHOLOGICAL FRACTURE PRESENCE: ICD-10-CM

## 2024-02-26 LAB
25(OH)D3 SERPL-MCNC: 41 NG/ML (ref 30–100)
ALBUMIN SERPL BCP-MCNC: 4 G/DL (ref 3.2–4.9)
ALBUMIN/GLOB SERPL: 1.5 G/DL
ALP SERPL-CCNC: 47 U/L (ref 30–99)
ALT SERPL-CCNC: 17 U/L (ref 2–50)
ANION GAP SERPL CALC-SCNC: 11 MMOL/L (ref 7–16)
AST SERPL-CCNC: 22 U/L (ref 12–45)
BILIRUB SERPL-MCNC: 0.8 MG/DL (ref 0.1–1.5)
BUN SERPL-MCNC: 21 MG/DL (ref 8–22)
CALCIUM ALBUM COR SERPL-MCNC: 9.6 MG/DL (ref 8.5–10.5)
CALCIUM SERPL-MCNC: 9.6 MG/DL (ref 8.5–10.5)
CHLORIDE SERPL-SCNC: 107 MMOL/L (ref 96–112)
CHOLEST SERPL-MCNC: 144 MG/DL (ref 100–199)
CO2 SERPL-SCNC: 23 MMOL/L (ref 20–33)
CREAT SERPL-MCNC: 1.07 MG/DL (ref 0.5–1.4)
FASTING STATUS PATIENT QL REPORTED: NORMAL
GFR SERPLBLD CREATININE-BSD FMLA CKD-EPI: 55 ML/MIN/1.73 M 2
GLOBULIN SER CALC-MCNC: 2.7 G/DL (ref 1.9–3.5)
GLUCOSE SERPL-MCNC: 90 MG/DL (ref 65–99)
HDLC SERPL-MCNC: 76 MG/DL
LDLC SERPL CALC-MCNC: 56 MG/DL
POTASSIUM SERPL-SCNC: 3.7 MMOL/L (ref 3.6–5.5)
PROT SERPL-MCNC: 6.7 G/DL (ref 6–8.2)
SODIUM SERPL-SCNC: 141 MMOL/L (ref 135–145)
TRIGL SERPL-MCNC: 58 MG/DL (ref 0–149)

## 2024-02-26 PROCEDURE — 36415 COLL VENOUS BLD VENIPUNCTURE: CPT

## 2024-02-26 PROCEDURE — 80061 LIPID PANEL: CPT

## 2024-02-26 PROCEDURE — 80053 COMPREHEN METABOLIC PANEL: CPT

## 2024-02-26 PROCEDURE — 82306 VITAMIN D 25 HYDROXY: CPT

## 2024-03-01 DIAGNOSIS — R79.89 ELEVATED SERUM CREATININE: ICD-10-CM

## 2024-03-12 DIAGNOSIS — E78.5 HYPERLIPIDEMIA, UNSPECIFIED HYPERLIPIDEMIA TYPE: ICD-10-CM

## 2024-03-12 NOTE — TELEPHONE ENCOUNTER
Received request via: Pharmacy    Was the patient seen in the last year in this department? Yes    Does the patient have an active prescription (recently filled or refills available) for medication(s) requested? No    Pharmacy Name: [facility] [address]    Does the patient have [facility] and need 100 day supply (blood pressure, diabetes and cholesterol meds only)? Medication is not for cholesterol, blood pressure or diabetes

## 2024-03-13 RX ORDER — ASPIRIN 81 MG/1
81 TABLET ORAL DAILY
Qty: 100 TABLET | Refills: 3 | Status: SHIPPED | OUTPATIENT
Start: 2024-03-13

## 2024-03-19 ENCOUNTER — TELEMEDICINE (OUTPATIENT)
Dept: BEHAVIORAL HEALTH | Facility: CLINIC | Age: 74
End: 2024-03-19
Payer: MEDICARE

## 2024-03-19 DIAGNOSIS — F90.0 ATTENTION DEFICIT HYPERACTIVITY DISORDER (ADHD), PREDOMINANTLY INATTENTIVE TYPE: ICD-10-CM

## 2024-03-19 DIAGNOSIS — G47.09 OTHER INSOMNIA: ICD-10-CM

## 2024-03-19 DIAGNOSIS — G47.10 HYPERSOMNIA: ICD-10-CM

## 2024-03-19 PROCEDURE — 90833 PSYTX W PT W E/M 30 MIN: CPT | Mod: 95 | Performed by: PSYCHIATRY & NEUROLOGY

## 2024-03-19 PROCEDURE — 99214 OFFICE O/P EST MOD 30 MIN: CPT | Mod: 95 | Performed by: PSYCHIATRY & NEUROLOGY

## 2024-03-19 RX ORDER — DEXTROAMPHETAMINE SACCHARATE, AMPHETAMINE ASPARTATE MONOHYDRATE, DEXTROAMPHETAMINE SULFATE AND AMPHETAMINE SULFATE 7.5; 7.5; 7.5; 7.5 MG/1; MG/1; MG/1; MG/1
30 CAPSULE, EXTENDED RELEASE ORAL EVERY MORNING
Qty: 30 CAPSULE | Refills: 0 | Status: SHIPPED | OUTPATIENT
Start: 2024-03-19 | End: 2024-03-26 | Stop reason: SDUPTHER

## 2024-03-19 RX ORDER — DEXTROAMPHETAMINE SACCHARATE, AMPHETAMINE ASPARTATE MONOHYDRATE, DEXTROAMPHETAMINE SULFATE AND AMPHETAMINE SULFATE 7.5; 7.5; 7.5; 7.5 MG/1; MG/1; MG/1; MG/1
30 CAPSULE, EXTENDED RELEASE ORAL EVERY MORNING
Qty: 30 CAPSULE | Refills: 0 | Status: SHIPPED | OUTPATIENT
Start: 2024-05-19 | End: 2024-06-18

## 2024-03-19 RX ORDER — DEXTROAMPHETAMINE SACCHARATE, AMPHETAMINE ASPARTATE MONOHYDRATE, DEXTROAMPHETAMINE SULFATE AND AMPHETAMINE SULFATE 7.5; 7.5; 7.5; 7.5 MG/1; MG/1; MG/1; MG/1
30 CAPSULE, EXTENDED RELEASE ORAL EVERY MORNING
Qty: 30 CAPSULE | Refills: 0 | Status: SHIPPED | OUTPATIENT
Start: 2024-04-18 | End: 2024-05-18

## 2024-03-19 RX ORDER — TRAZODONE HYDROCHLORIDE 100 MG/1
150 TABLET ORAL NIGHTLY PRN
Qty: 135 TABLET | Refills: 1 | Status: SHIPPED | OUTPATIENT
Start: 2024-03-19
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
## 2024-03-26 DIAGNOSIS — G47.10 HYPERSOMNIA: ICD-10-CM

## 2024-03-26 DIAGNOSIS — F90.0 ATTENTION DEFICIT HYPERACTIVITY DISORDER (ADHD), PREDOMINANTLY INATTENTIVE TYPE: ICD-10-CM

## 2024-03-26 RX ORDER — DEXTROAMPHETAMINE SACCHARATE, AMPHETAMINE ASPARTATE MONOHYDRATE, DEXTROAMPHETAMINE SULFATE AND AMPHETAMINE SULFATE 7.5; 7.5; 7.5; 7.5 MG/1; MG/1; MG/1; MG/1
30 CAPSULE, EXTENDED RELEASE ORAL EVERY MORNING
Qty: 30 CAPSULE | Refills: 0 | Status: SHIPPED | OUTPATIENT
Start: 2024-03-26 | End: 2024-04-25

## 2024-03-26 NOTE — TELEPHONE ENCOUNTER
Please re-send prescription to [facility] pharmacy since [facility] pharmacy did not have Adderall XR in stock. Thank you!

## 2024-03-27 ENCOUNTER — TELEMEDICINE (OUTPATIENT)
Dept: BEHAVIORAL HEALTH | Facility: CLINIC | Age: 74
End: 2024-03-27
Payer: MEDICARE

## 2024-03-27 DIAGNOSIS — F90.2 ATTENTION DEFICIT HYPERACTIVITY DISORDER (ADHD), COMBINED TYPE: ICD-10-CM

## 2024-03-27 DIAGNOSIS — F33.1 MAJOR DEPRESSIVE DISORDER, RECURRENT EPISODE, MODERATE (HCC): ICD-10-CM

## 2024-03-27 PROCEDURE — 90834 PSYTX W PT 45 MINUTES: CPT | Mod: 95

## 2024-03-27 NOTE — PROGRESS NOTES
"[facility]   Therapy Progress Note    Therapy was provided on this date in coordination with the [facility] approved Clinical Supervisor under the direct supervision of Dr. Chang who was on site during this visit.     Therapist reviewed informed consent, limits of confidentiality and [facility] policies; patient expressed understanding and agreed to voluntarily proceed with evaluation and treatment.    This visit was conducted via Zoom using secure and encrypted videoconferencing technology. The patient was in a private location in the [facility]. The patient's identity was confirmed and verbal consent was obtained for this virtual visit.  Place of Service: POS 10 -The patient is at Home during their visit in the [facility].    Name: Kristen Carrera  MRN: [id number]  : 1950  Age: 73 y.o.  Date of assessment: 3/27/2024  PCP: Binta To M.D.  Persons in attendance: Patient and MARYANNE Foreman-Intern  Total session time: 50 minutes    Topics addressed in psychotherapy include:     Client reported she has been busy, watching the grandbaby, has been trying to be supportive for daughter, Annalee after she lost a close friend but has had to take a step back because Annalee has been confrontational. Client reported continuing trying to take care of medical issues, has seen slow progress in treating some of her concerns. Instability has not been addressed, client plans to get a shower chair. Client has been able to clean up some of her apartment, has not been able to consistently refill her Adderall which client believes may be contributing to her inability to complete tasks or projects. Client reported continuing to struggle with motivation to leave the apartment, has not started a routine for going on walks, has not gotten any further with volunteering or added anything to her schedule intended to get her out of the apartment \"It sounds like a great idea in my head but I feel " "so stuck, I don't know why\". Client reported she has been talking to her sister-in-law in [place] who has also been experiencing some depression and client has suggested they zoom together while going on a walk, believes helping others is a greater source of motivation for her. Discussed medications, currently taking adderall and trazadone but no antidepressants at this time, has tried in the past but did not see a long-term difference. Client plans to talk with Dr. Carrion about trying antidepressants at next appt, possibly trying TMS. Will continue to provide support.      Objective Observations:   Participation:Active verbal participation, Attentive, Engaged, and Open to feedback   Grooming:Casual   Cognition:Alert and Fully Oriented   Eye Contact:Good   Mood:Depressed   Affect:Congruent with content   Thought Process:Logical   Speech:Rate within normal limits and Volume within normal limits    Current Risk:   Suicide:  Not indicated   Homicide:  Not indicated   Self-Harm:  Not indicated   Relapse:  Not indicated   Safety Plan Reviewed: not applicable        Diagnosis:  1. Major depressive disorder, recurrent episode, moderate (HCC)    2. Attention deficit hyperactivity disorder (ADHD), combined type              Frances Grace LMSW, CSW-Intern    "

## 2024-04-09 ENCOUNTER — OFFICE VISIT (OUTPATIENT)
Dept: BEHAVIORAL HEALTH | Facility: CLINIC | Age: 74
End: 2024-04-09
Payer: MEDICARE

## 2024-04-09 DIAGNOSIS — F90.2 ATTENTION DEFICIT HYPERACTIVITY DISORDER (ADHD), COMBINED TYPE: ICD-10-CM

## 2024-04-09 DIAGNOSIS — F33.1 MAJOR DEPRESSIVE DISORDER, RECURRENT EPISODE, MODERATE (HCC): ICD-10-CM

## 2024-04-09 PROCEDURE — 90834 PSYTX W PT 45 MINUTES: CPT

## 2024-04-09 NOTE — PROGRESS NOTES
[facility] Therapy Progress Note      Therapy was provided on this date in coordination with the [facility] approved Clinical Supervisor under the direct supervision of Dr. Chang who was on site during this visit.    Therapist reviewed informed consent, limits of confidentiality and [facility] policies; patient expressed understanding and agreed to voluntarily proceed with evaluation and treatment.    Name: Kristen Carrera  MRN: [id number]  : 1950  Age: 73 y.o.  Date of assessment: 2024  PCP: Binta To M.D.  Persons in attendance: Patient, Frances Grace, CSW-Intern, and Jeanna Henriquez, W-Intern  Total session time: 45 minutes      Topics addressed in psychotherapy include:     Client reported she has been maintaining contact with sister-in-law, will talk to her on the phone about 2 hours every day, has asked her to come visit and would like to plan a trip with her. Client has been spending more time helping her daughter by watching the grand-baby, which client enjoys and looks forward to. Client reported experiencing periodic bursts of energy and motivation, has been able to do some cleaning in her apartment, but is still struggling overall with motivation to get out of the apartment most days. Discussed TMS as a possible treatment for depression symptoms. Client expressed interest, reported intention to talk to Dr. Carrion about at her appt next week. Will continue to provide support.    Objective Observations:   Participation:Active verbal participation, Attentive, Engaged, and Open to feedback   Grooming:Neat   Cognition:Alert and Fully Oriented   Eye Contact:Good   Mood:Depressed   Affect:Sad   Thought Process:Logical   Speech:Rate within normal limits and Volume within normal limits    Current Risk:   Suicide:  Not indicated   Homicide:  Not indicated   Self-Harm:  Not indicated    Relapse:  Not indicated    Safety Plan Reviewed: not  applicable        Diagnosis:  1. Major depressive disorder, recurrent episode, moderate (HCC)    2. Attention deficit hyperactivity disorder (ADHD), combined type            Frances Grace LMSW, CSW-Intern

## 2024-04-17 ENCOUNTER — TELEMEDICINE (OUTPATIENT)
Dept: BEHAVIORAL HEALTH | Facility: CLINIC | Age: 74
End: 2024-04-17
Payer: MEDICARE

## 2024-04-17 DIAGNOSIS — F15.11 HISTORY OF METHAMPHETAMINE ABUSE (HCC): ICD-10-CM

## 2024-04-17 DIAGNOSIS — F32.4 MAJOR DEPRESSIVE DISORDER IN PARTIAL REMISSION, UNSPECIFIED WHETHER RECURRENT (HCC): ICD-10-CM

## 2024-04-17 DIAGNOSIS — F33.2 MDD (MAJOR DEPRESSIVE DISORDER), RECURRENT SEVERE, WITHOUT PSYCHOSIS (HCC): ICD-10-CM

## 2024-04-17 DIAGNOSIS — F98.8 ATTENTION DEFICIT DISORDER, UNSPECIFIED HYPERACTIVITY PRESENCE: ICD-10-CM

## 2024-04-17 PROCEDURE — 90833 PSYTX W PT W E/M 30 MIN: CPT | Mod: 95 | Performed by: PSYCHIATRY & NEUROLOGY

## 2024-04-17 PROCEDURE — 99215 OFFICE O/P EST HI 40 MIN: CPT | Mod: 95 | Performed by: PSYCHIATRY & NEUROLOGY
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
## 2024-04-18 ENCOUNTER — TELEPHONE (OUTPATIENT)
Dept: BEHAVIORAL HEALTH | Facility: CLINIC | Age: 74
End: 2024-04-18
Payer: MEDICARE

## 2024-04-18 NOTE — TELEPHONE ENCOUNTER
----- Message from Alyssa Carrion M.D. sent at 4/17/2024  1:13 PM PDT -----  Regarding: Referral to [facility] and send today's and last visit's progress note  Sourav Jacome,    Will you please place a referral for this patient to the [facility] and also send them her last visit note from me as well as today's note?    Thank you!!  Alyssa

## 2024-04-22 ENCOUNTER — HOSPITAL ENCOUNTER (OUTPATIENT)
Dept: LAB | Facility: MEDICAL CENTER | Age: 74
End: 2024-04-22
Attending: PSYCHIATRY & NEUROLOGY
Payer: MEDICARE

## 2024-04-22 ENCOUNTER — OFFICE VISIT (OUTPATIENT)
Dept: NEUROLOGY | Facility: MEDICAL CENTER | Age: 74
End: 2024-04-22
Attending: PSYCHIATRY & NEUROLOGY
Payer: MEDICARE

## 2024-04-22 VITALS
SYSTOLIC BLOOD PRESSURE: 130 MMHG | OXYGEN SATURATION: 94 % | RESPIRATION RATE: 16 BRPM | HEART RATE: 78 BPM | WEIGHT: 136.24 LBS | BODY MASS INDEX: 25.07 KG/M2 | DIASTOLIC BLOOD PRESSURE: 82 MMHG | HEIGHT: 62 IN | TEMPERATURE: 98.2 F

## 2024-04-22 DIAGNOSIS — G31.84 MILD COGNITIVE IMPAIRMENT WITH MEMORY LOSS: ICD-10-CM

## 2024-04-22 DIAGNOSIS — F32.4 MAJOR DEPRESSIVE DISORDER IN PARTIAL REMISSION, UNSPECIFIED WHETHER RECURRENT (HCC): ICD-10-CM

## 2024-04-22 DIAGNOSIS — G47.33 OSA TREATED WITH BIPAP: ICD-10-CM

## 2024-04-22 DIAGNOSIS — G31.84 MILD COGNITIVE IMPAIRMENT WITH MEMORY LOSS: Primary | ICD-10-CM

## 2024-04-22 DIAGNOSIS — Z86.73 HISTORY OF LACUNAR CEREBROVASCULAR ACCIDENT: ICD-10-CM

## 2024-04-22 LAB — FOLATE SERPL-MCNC: >40 NG/ML

## 2024-04-22 PROCEDURE — 82746 ASSAY OF FOLIC ACID SERUM: CPT

## 2024-04-22 PROCEDURE — 99213 OFFICE O/P EST LOW 20 MIN: CPT | Performed by: PSYCHIATRY & NEUROLOGY

## 2024-04-22 PROCEDURE — 3079F DIAST BP 80-89 MM HG: CPT | Performed by: PSYCHIATRY & NEUROLOGY

## 2024-04-22 PROCEDURE — 3075F SYST BP GE 130 - 139MM HG: CPT | Performed by: PSYCHIATRY & NEUROLOGY

## 2024-04-22 PROCEDURE — 84425 ASSAY OF VITAMIN B-1: CPT

## 2024-04-22 PROCEDURE — 99212 OFFICE O/P EST SF 10 MIN: CPT | Performed by: PSYCHIATRY & NEUROLOGY

## 2024-04-22 PROCEDURE — 36415 COLL VENOUS BLD VENIPUNCTURE: CPT

## 2024-04-22 ASSESSMENT — PATIENT HEALTH QUESTIONNAIRE - PHQ9
5. POOR APPETITE OR OVEREATING: 0 - NOT AT ALL
SUM OF ALL RESPONSES TO PHQ QUESTIONS 1-9: 5
CLINICAL INTERPRETATION OF PHQ2 SCORE: 2

## 2024-04-22 ASSESSMENT — FIBROSIS 4 INDEX: FIB4 SCORE: 1.56

## 2024-04-22 NOTE — Clinical Note
Follow up with me in about 6 months on a MONDAY ! Call Jeffrey (Grand Son)- to accompany her to this visit> [phone]

## 2024-04-22 NOTE — PROGRESS NOTES
"Reason for Neurology Consult: memory disturbances x 4-5 years.     History of present illness:     Kristen Evansp 72 y.o. right handed woman who is from [facility] and finished High School in [place] and then went to a [employer] [place] ([place]).       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    Lives alone in the [facility] and closed family is Jeffrey (and she sees him 2 times a month).    About  for the last 4-5 years she was noticing slightly issue with her memory such as when in the middle of doing anything that she could have problems recalling where an ingredient was noted.     In the last 2-3 years she has noticed \"all the time\" she will lose things or misplacing things (can of coffee, power tools,keys (rare as she has a hook on the wall for them) and glasses phone.      She has had paranoia,delusions,hallucinations in the last 6-12 months.     Suicidal- age 10 (took 15 Aspirin) and then age 19 (cut  right wrist with a razor blade - no major bleeding). Age 37 (took bottle of Pain Pills> took an entire bottle)> boyfriend found bottle and paramedics came and she threw pills away and did not get taken to hospital.  She denies any suicidal thoughts, intentions,thoughts or actions (trying to actively hurt herself) in the last 20 plus years.     There are no REM Behavioral symptoms; takes Adderal (for hypersomloence> via \"[facility]\"   Denies snoring or self arousals. Typically gets 9-11 hours most night but awakens for group Rx (3 days a week).     Last time she was subjectively suicidal was about 15 years ago > called Suicidal Hotline when at [facility]. She was admitted for 9 Days at a [facility] associated facility.     She has been treated for Depression (Major) and lesser so Anxiety issues.  Has been treated for Depression x 15-20 years but is going to Group Rx at [facility] (Behavior th Program).        No history of concussions, seizure type " "events (or documented epilepsy) or stroke(s) in her adult.     No recurrent falls or near falls in the last 6 months and an accidental fall on her daughter's birthday- injured (Broke) right ankle area.     No dysarthria,dysphagia,diplopia (or other visual disturbances) in the last 6 months or so.     No headaches or specific evolving pains  (physical type) ongoing in the last several months.     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 She was taking this medication to \"motivate self to do things\" (when to Dr. Pham- Sleep Specialist at [facility] who is since retired).    Denies evolving gait-balance or postural instability in the last 6-12 months.    Denies any discrete episodes of migraine(s), unilateral vision loss or evolving visual disturbances in the last 6 months or so.     Denies involuntary movements of the limbs in the last 6-12 months or so.     Denies absolutely no problems with her driving or operating a motorized vehicle in the last 6-12 months or so.     No significant tobacco use.    Significant alcohol use- stopped at age 45 drinking excessively.  Prior to that was drinking 1/5 Teddy Fierro or Vimal Ryan about 2-3 times a week.  Still drinks (2-3 times per month)- 4 shots within 1 down     Family Hx: no notable memory,cognitive,dementia issues.  Mother:  in her mid 60's (MI)- long time smoker.  Father: Pancreatic Cancer (non smoker)- in his 70s  Sister: age 74 (lives in [place]).  Sister: age 66 (breast cancer x 2)> in her late 50's (1st time)> no mastectomy.  Brother: age 65         Patient Active Problem List    Diagnosis Date Noted    Risk for falls 01/10/2024    Skin lesion 2023    Complaints of memory disturbance 2023    History of alcohol abuse 2023    Major depressive disorder in partial remission (HCC) 2023    History of methamphetamine abuse (HCC) 2023    H/O alcohol abuse 2023    MDD (major " depressive disorder), recurrent severe, without psychosis (HCC) 06/01/2023    Vision loss, bilateral 02/02/2023    Severe MAX (obstructive sleep apnea) 07/26/2022    Urinary incontinence 10/28/2021    Primary hypertension 10/18/2018    Insomnia 09/28/2018    Midline low back pain without sciatica 08/04/2015    Osteoporosis 08/19/2014    Hypersomnolence 01/17/2012    PATENT FORAMEN OVALE LEFT TO RIGHT SHUNT txed w/ asa- has seen cardio dr salcedo 12/08/2010    Herpes, genital 10/12/2010    ADD (attention deficit disorder)     Hyperlipidemia        Past medical history:   Past Medical History:   Diagnosis Date    ADD (attention deficit disorder)     Anxiety     Arthritis     CTS (carpal tunnel syndrome)     B/L    Depression     Fatigue 11/08/2011    H/O fracture of ankle 09/09/2022    Added automatically from request for surgery [id number]    History of abdominoplasty 03/15/2012    History of hypothyroidism     Hyperlipidemia     Hypothyroidism     Midline low back pain without sciatica 08/04/2015    Moderate episode of recurrent major depressive disorder (HCC) 10/06/2015    Osteoporosis 08/19/2014    PATENT FORAMEN OVALE LEFT TO RIGHT SHUNT 12/08/2010       Past surgical history:   Past Surgical History:   Procedure Laterality Date    PB OPEN TX TRIMALLEOLAR ANKLE FX W/O FIX PST LIP Right 9/12/2022    Procedure: RIGHT TRIMALLEOLAR OPEN REDUCTION INTERNAL FIXATION, RIGHT SYNDESMOSIS OPEN REDUCTION INTERNAL FIXATION;  Surgeon: Trey Conway M.D.;  Location: [facility];  Service: Orthopedics    ABDOMINOPLASTY  1977    APPENDECTOMY      OTHER      TUMMY TUCK, BREAST REDUCTION, LIPOSUCTION OF HIPS AND THIGHS.    PB MAMMARY DUCTOGRAM, SINGLE      CA ANESTH,LOLA HYST FOL NEURAXIAL ANAL/ANES      CA BREAST REDUCTION      TONSILLECTOMY      TUBAL COAGULATION LAPAROSCOPIC BILATERAL           Social history:   Social History     Socioeconomic History    Marital status:      Spouse name: Not on  file    Number of children: Not on file    Years of education: Not on file    Highest education level: Not on file   Occupational History    Not on file   Tobacco Use    Smoking status: Never    Smokeless tobacco: Never   Vaping Use    Vaping Use: Never used   Substance and Sexual Activity    Alcohol use: Yes     Comment: rare    Drug use: Yes     Types: Marijuana     Comment: marijuana several times a year, H/O METHAMPHETAMINE FOR 17 YRS.  QUIT 2008.    Sexual activity: Not Currently     Birth control/protection: Post-Menopausal   Other Topics Concern    Not on file   Social History Narrative    Not on file     Social Determinants of Health     Financial Resource Strain: Low Risk  (1/10/2024)    Overall Financial Resource Strain (CARDIA)     Difficulty of Paying Living Expenses: Not hard at all   Recent Concern: Financial Resource Strain - High Risk (11/9/2023)    Overall Financial Resource Strain (CARDIA)     Difficulty of Paying Living Expenses: Hard   Food Insecurity: No Food Insecurity (1/10/2024)    Hunger Vital Sign     Worried About Running Out of Food in the Last Year: Never true     Ran Out of Food in the Last Year: Never true   Recent Concern: Food Insecurity - Food Insecurity Present (11/9/2023)    Hunger Vital Sign     Worried About Running Out of Food in the Last Year: Sometimes true     Ran Out of Food in the Last Year: Never true   Transportation Needs: No Transportation Needs (1/10/2024)    PRAPARE - Transportation     Lack of Transportation (Medical): No     Lack of Transportation (Non-Medical): No   Physical Activity: Insufficiently Active (11/9/2023)    Exercise Vital Sign     Days of Exercise per Week: 1 day     Minutes of Exercise per Session: 10 min   Stress: No Stress Concern Present (1/10/2024)    [place] [employer] - Occupational Stress Questionnaire     Feeling of Stress : Not at all   Social Connections: Socially Isolated (1/10/2024)    Social Connection and  "Isolation Panel [NHANES]     Frequency of Communication with Friends and Family: Once a week     Frequency of Social Gatherings with Friends and Family: Never     Attends Sabianism Services: Never     Active Member of Clubs or Organizations: No     Attends Club or Organization Meetings: Never     Marital Status:    Intimate Partner Violence: Not on file   Housing Stability: Low Risk  (1/10/2024)    Housing Stability Vital Sign     Unable to Pay for Housing in the Last Year: No     Number of Places Lived in the Last Year: 1     Unstable Housing in the Last Year: No       Family history:   Family History   Problem Relation Age of Onset    Hypertension Mother     Cancer Mother         UTERUS    Heart Disease Mother         Arteriosclerotic Cardiovascular Disease    Other Mother         Coronary Artery Bypass Graft    Psychiatric Illness Mother     Arthritis Mother     Cancer Father         PANCREATIC    Cancer Sister 51        BREAST    Breast Cancer Sister     Stroke Neg Hx     Diabetes Neg Hx     Lung Disease Neg Hx          Current medications:   Current Outpatient Medications   Medication    amphetamine-dextroamphetamine ER (ADDERALL XR) 30 MG XR capsule    amphetamine-dextroamphetamine ER (ADDERALL XR) 30 MG XR capsule    [START ON 5/19/2024] amphetamine-dextroamphetamine ER (ADDERALL XR) 30 MG XR capsule    traZODone (DESYREL) 100 MG Tab    aspirin 81 MG EC tablet    lisinopril (PRINIVIL) 20 MG Tab    rosuvastatin (CRESTOR) 20 MG Tab    alendronate (FOSAMAX) 70 MG Tab    MAGNESIUM PO    tolterodine ER (DETROL LA) 4 MG CAPSULE SR 24 HR    Calcium Carbonate-Vitamin D (CALCIUM-D PO)     No current facility-administered medications for this visit.       Medication Allergy:  Allergies   Allergen Reactions    Hydrocodone      Nightmares and \"makes my skin crawl\"    Hydrocodone-Acetaminophen Unspecified           Physical examination:   Vitals:    04/22/24 0923   BP: 130/82   BP Location: Right arm   Patient " "Position: Sitting   BP Cuff Size: Adult   Pulse: 78   Resp: 16   Temp: 36.8 °C (98.2 °F)   TempSrc: Temporal   SpO2: 94%   Weight: 61.8 kg (136 lb 3.9 oz)   Height: 1.575 m (5' 2\")       Normal cephalic atraumatic.  There is full range of movement around the neck in all directions without restrictions or discrete pain evoked triggers.  No lower extremity edema.      Neurological  Exam:      Austin Cognitive Assessment (MOCA) Version 7.1    Years of Education: High School Graduate    TOTAL SCORE: 26/30  (to be scanned into the MEDIA section in the E.M.R.)        Mental status: Awake, alert and fully oriented to person, place, time, and situation. Normal attention and concentration.  Did not appear/act combative,irritable,anxious,paranoid/delusional or aggressive to or with me.    Speech and language: Speech is fluent without errors, clear, intact to repetition, and intact to naming.     Follows 3 step motor commands in sequence without significant delay and correctly.    Cranial nerve exam:  II: Pupils are equally round and reactive to light. Visual fields are intact by confrontation.  III, IV, VI: EOMI, no diplopia, no ptosis.  V: Sensation to light touch is normal over V1-3 distributions bilaterally.  .  VII: Facial movements are symmetrical. There is no facial droop. .  VIII: Hearing intact to soft speech and finger rub bilaterally  IX: Palate elevates symmetrically, uvula is midline. Dysarthria is not present.  XI: Shoulder shrug are symmetrical and strong.   XII: Tongue protrudes midline.      Motor exam:  Muscle tone is normal in all 4 limbs. and No abnormal movements appreciated.    Muscle strength:    Neck Flexors/Extensors: 5/5       Right  Left  Deltoid   5/5  5/5      Biceps   5/5  5/5  Triceps              5/5  5/5   Wrist extensors 5/5  5/5  Wrist flexors  5/5  5/5     5/5  5/5  Interossei  5/5  5/5  Thenar (APB)  5/5  5/5   Hip " flexors  5/5  5/5  Quadriceps  5/5  5/5    Hamstrings  5/5  5/5  Dorsiflexors  5/5  5/5  Plantarflexors  5/5  5/5  Toe extension  5/5  5/5      Sensory exam:    Vibratory Threshold - 6-8 seconds at great toes, 12 seconds at ankles, 12 seconds at knees, 20-22 seconds at index and 5th fingers.    Proprioception: normal at great toes.    Reflexes:       Right  Left  Biceps   2/4  2/4  Triceps               2/4  2/4  Brachioradialis 2/4  2/4  Knee jerk  2/4  2/4  Ankle jerk  2/4  2/4     Frontal release signs are absent    bilaterally toes are downgoing to plantar stimulation..    Coordination (finger-to-nose, heel/knee/shin, rapid alternating movements) was normal.     There was no ataxia, no tremors, and no dysmetria.     Station and gait were normal. Easily stands up from exam chair without retropulsion,veering,leaning,swaying (to either side).       Labs and Tests and NEUROIMAGING:     Narrative & Impression     8/9/2023 1:15 PM     HISTORY/REASON FOR EXAM:  Memory decline over 2-3 years ;     TECHNIQUE/EXAM DESCRIPTION :  MRI of the brain without contrast.     Multiplanar multisequence MR examination of the brain without contrast done on 1.5 MRI scanner.     COMPARISON:  9/8/2018     FINDINGS:  There are small bilateral cerebellar chronic infarcts. There are nonspecific T2 hyperintensities in the subcortical and periventricular white matter. Mild cerebral volume loss is seen. There is no intra-axial space-occupying lesion. There is no evidence   of hippocampal volume loss or signal change. Brainstem gliosis is seen.     There is mild to moderate dilatation of the lateral and third ventricles with mild periventricular edema.     There is no large lesion identified in the expected course of the intracranial portions of the cranial nerves.     The skull bones are unremarkable.     The visualized mucosal surfaces are unremarkable.     The extracranial soft tissue including orbits appear grossly normal.      IMPRESSION:     1.  There is mild to moderate dilatation of the lateral and third ventricles with mild periventricular edema. When compared with the previous MRI, there has been mild interval increase in the size of the ventricular dilatation. There is also prominent   sylvian fissures compared with the frontal parietal sulci. There is prominent flow void at the aqueduct. These findings can be seen in elderly normal pressure hydrocephalus.  2.  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 This is new since the previous MRI dated 9/8/2018  3.  Mild cerebral volume loss.  4.  Mild chronic microvascular ischemic disease.  5.  Brainstem gliosis  6.  There is no neuronal degenerative specific volume loss.              Exam Ended: 08/09/23  1:49 PM Last Resulted: 08/10/23  6:46 AM           0 Result Notes       1 Patient Communication            Component  Ref Range & Units 1 mo ago  (2/26/24) 1 yr ago  (2/22/23) 1 yr ago  (4/27/22) 5 yr ago  (11/2/18) 5 yr ago  (9/29/18) 8 yr ago  (3/15/16) 11 yr ago  (3/12/13)   Cholesterol,Tot  100 - 199 mg/dL 144 208 High  179 203 High  163 214 High  139   Triglycerides  0 - 149 mg/dL 58 80 49 75 62 132 77   HDL  >=40 mg/dL 76 86 60 67 53 51 45   LDL  <100 mg/dL 56 106 High  109 High  121 High  98 137 High  79        VITAMIN B12  Order: [id number]   Status: Final result       Visible to patient: Yes (not seen)       Next appt: 09/03/2024 at 11:30 AM in Pulmonary and Sleep Medicine ([facility]field, A.P.R.N.)    0 Result Notes       1 Patient Communication        Component  Ref Range & Units 9 mo ago 1 yr ago 11 yr ago   Vitamin B12 -True Cobalamin  211 - 911 pg/mL 1183 High  373 242   Resulting Agency M          VITAMIN B1  Order: [id number]   Status: Final result       Visible to patient: Yes (not seen)       Next appt: 09/03/2024 at 11:30 AM in Pulmonary and Sleep Medicine ([facility] Greenfield, A.P.R.N[facility]       Dx: Complaints of memory disturbance    0 Result Notes        1 Patient Communication      Component  Ref Range & Units 9 mo ago   Vitamin B1  70 - 180 nmol/L 83   Comment: INTERPRETIVE INFORMATION: Vitamin B1, Whole Blood  This assay measures the concentration of thiamine diphosphate  (TDP), the primary active form of vitamin B1. Approximately 90  percent of vitamin B1 present in whole blood is TDP. Thiamine and  thiamine monophosphate, which comprise the remaining 10 percent,  are not measured.  This test was developed and its performance characteristics  determined by [facility]. It has not been cleared or  approved by the US Food and Drug Administration. This test was  performed in a CLIA certified laboratory and is intended for  clinical purposes.  Performed By: [facility]  [address] [place] [id number]  : Michele Magaña MD, PhD   Resulting Agency [facility]        FOLATE  Order: [id number] - Reflex for Order [id number]   Status: Final result       Visible to patient: Yes (not seen)       Next appt: 09/03/2024 at 11:30 AM in Pulmonary and Sleep Medicine ([facility] Greenfield, A.P.R.N[facility]    0 Result Notes       1 Patient Communication      Component  Ref Range & Units 9 mo ago   Folate -Folic Acid  >4.0 ng/mL 4.4   Resulting Agency                    METHYLMALONIC ACID, SERUM  Order: [id number]   Status: Final result       Visible to patient: Yes (seen)       Next appt: 09/03/2024 at 11:30 AM in Pulmonary and Sleep Medicine ([facility]field, A.P.R.N[facility]       Dx: Complaints of memory disturbance    0 Result Notes       1 Patient Communication      Component  Ref Range & Units 9 mo ago   Methylmalonic Acid, Serum  0.00 - 0.40 umol/L 0.20   Comment: INTERPRETIVE INFORMATION: MMA Serum/Plasma,  Vitamin B12 Status  This test was developed and its performance characteristics  determined by [facility]. It has not been cleared or  approved by the US Food and Drug Administration. This test was  performed in a CLIA certified laboratory and  is intended for  clinical purposes.  Performed By: [facility]  [address] [place] [address]  : Michele Magaña MD, PhD           2 Patient Communications            Component  Ref Range & Units 9 mo ago  (7/5/23) 1 yr ago  (2/22/23) 5 yr ago  (11/2/18) 8 yr ago  (3/15/16) 11 yr ago  (3/12/13) 11 yr ago  (6/12/12) 12 yr ago  (1/13/12)   25-Hydroxy   Vitamin D 25  30 - 100 ng/mL 54 42 CM 36 CM 23 Low  CM 77 CM 17 Low  CM 35 CM   Comment: Adult Ranges:   <20 ng/mL - Deficiency  20-29 ng/mL - Insufficiency   ng/mL - Sufficiency  Electrochemiluminescence binding assay performed using Roche mariela e  immunoassay analyzer.  The Elecsys Vitamin D total II assay is intended for  the quantitative determination of total 25 hydroxyvitamin D in human serum  and plasma. This assay is to be used as an aid in the assessment of vitamin  D sufficiency in adults.        Impression/Plans/Recommendations:    Mild Cognitive Impairment with Memory Disturbances for over 4 years now.     MOCA score today-  26/30 with 3 out of 5 words at 3 minutes> got chair and egg with prompting and had minor difficulties placing hands on time given and cylinder not drawn correctly (though close)    Global Deterioration Score in the 2 to 3 range.     2. History of Major Depression and going to Group Therapy three times a week and sees Alyssa Carrion MD (Psychiatrist)   Takes Adderral.     Brain MRI reviewed from 2023 and there is both mild cortical diffuse atrophy and maybe a slight increase in ventricular enlargement.  Additional 2 small cerebellar infarcts (deep) seen.     There are  features of Parkinsonism at this time.        2. Major Depression Disorder- long standing.     3. Obstructive Sleep Apnea- awaiting for BIPAP evaluation.     Today, I reviewed the clinical criteria and reviewed several  scenarios of the differences being using the medical terms of normal brain aging (age associated memory  "impairment),  Mild Cognitive Impairment (MCI) and Dementia.    MCI is a syndrome but statistically and for the majority of patients  occurs due  to a more rapid aging of the brain tissue or potentially from injury to certain parts of one's brain ( often from such contributing factors as  the cumulative effects of alcohol, from one or more ischemic or hemmorhagic stroke(s), from neurodegeneration or long standing with/without suboptimally controlled Hypertension). It is for some of these potential factors as to why I recommend a brain imaging test (Head CT or Brain MRI) be done for the 1st time or in certain circumstances repeated for comparison purposes  as such imaging can suggest one or more factors as to the reason(s) for the person's cognitive/memory changes. In fact, a normal or \"age related\" finding on a brain imaging test in and of itself is useful clinical and objective information to have in the evaluation of a person who has either an acute, evolving or even chronic (months to years) long cognitive/memory complaint.     Additional factors or contributors to Brain Health issues can be summarized in several books/references which I discussed as well today.      Goals going forwards include:     A. Paying attention to one's risk factors and reducing their prevalence or potential impact on one's changing memory/thinking> an excellent example would be to stop smoking, reduce or eliminate alcohol use (depending on the amount and frequency of usage), maintain good blood pressure control by buying a digital arm blood pressure cuff such as an OMRON Series 3 or 5 and checking one's blood pressure atleast 3 days per week (in the am and early afternoon) that the numbers are under 140/90 and working as needed with the primary care doctor  to optimize blood pressure control).        B. Encouraging proper sleep hygiene which for most adults is 7 to 8 hours of uninterrupted sleep per night.       C. Encouraging some form " of exercise preferable aerobic forms to be done (4 to 5 days per week- 30 minutes minimum per day)> 150 minutes per week as a goal. Example activities could include fast walking (up a slight incline), jogging, cycling (road or stationary), swimming,tennis. Essentially, even basic walking on a flat surface or a treadmill would be better than doing nothing.     D. Maintaining or forming new social contacts with family and friends  and a positive attitude despite the concerns and/or ongoing issues with thinking and/or memory.     E. Eating well which means a diet low in salt  (under 2 grams per day), sugar and saturated fat.     F. Maintaining one's BMI (Body Mass Index) under 30.     G.  Discussed Brain  Health Topics and some books to read about Brain Health.     H. She will bring her grandson to next visit for more additional information.     I. Encouraged continued follow up with her Behavioral Health Group.    J.  On Daily ASA and a high Intensity statin given Brain MRI findings from 8/2023 Brain MRI.    K. We reviewed the Brain MRI results and in retrospect she has not had a progressive gait decline in the last 6 months or so and on exam today does not have a magnetic gait. At this time she does not want to pursue a Large Volume CSF removal study.    L. Neuropsychological evaluation to be arranged with Dr. Sims PhD for a more formal evaluation and [place] was agreeable to this type of more in depth assessment to gauge her cognitive strengths and weaknesses (Jeffrey- her grand son with accompany her to that visit).    M. Will recheck blood Folate and Thiamine levels as these 2 were on the low normal side in July 2023.      I have performed  a history and physical exam and a directed /focused  ROS today.    Total time spent today or this patient's care was 30  minutes and included reviewing  the diagnostic workup to date (such as labs and imaging as well as interpreting such tests relevant to this patient's  neurological condition),  reviewing/obtaining separately obtained history (from patient)  for today's neurological problem(s) ,counseling and educating the patient and family member on issues related to cognition/memory and cognitive health factors and documenting  the clinical information in the EMR.    Follow up in 9 months or so.        Michele Li MD  [place] [facility].   [facility]

## 2024-04-23 ENCOUNTER — TELEMEDICINE (OUTPATIENT)
Dept: BEHAVIORAL HEALTH | Facility: CLINIC | Age: 74
End: 2024-04-23
Payer: MEDICARE

## 2024-04-23 DIAGNOSIS — F98.8 ATTENTION DEFICIT DISORDER, UNSPECIFIED HYPERACTIVITY PRESENCE: ICD-10-CM

## 2024-04-23 DIAGNOSIS — F33.1 MAJOR DEPRESSIVE DISORDER, RECURRENT EPISODE, MODERATE (HCC): ICD-10-CM

## 2024-04-23 PROCEDURE — 90834 PSYTX W PT 45 MINUTES: CPT | Mod: 95

## 2024-04-23 NOTE — PROGRESS NOTES
[facility] Progress Note    Therapy was provided on this date in coordination with the [facility] approved Clinical Supervisor under the direct supervision of Dr. Chang who was on site during this visit.     Therapist reviewed informed consent, limits of confidentiality and [facility] policies; patient expressed understanding and agreed to voluntarily proceed with evaluation and treatment.    This visit was conducted via Zoom using secure and encrypted videoconferencing technology. The patient was in a private location in the [facility]. The patient's identity was confirmed and verbal consent was obtained for this virtual visit.  Place of Service: POS 10 -The patient is at Home during their visit in the [facility].    Name: Kristen Carrera  MRN: [id number]  : 1950  Age: 73 y.o.  Date of assessment: 2024  PCP: Binta To M.D.  Persons in attendance: Patient and MARYANNE Foreman-Intern  Total session time: 50 minutes    Topics addressed in psychotherapy include:     Data: Client was alert and oriented, presented optimistic and motivated. Client has been watching her 2yo great-grandson more since daughter has changed work routine. Client has spoken with Dr. Carrion about alternative methods of treatment for chronic depression.    Assessment: Client has been going to daughter's house several days a week to watch great-grandson, has been tired but overall enjoying more time with the baby. Client continues to talk to sister-in-law regularly. Client has been more proactive in cleaning the apartment. Client has been referred to IOP and esketamine treatments for chronic depression by psychiatrist. Client expressed optimistic outlook.     Plan: Supportive psychotherapy was provided. Plan of care is to continue with solution-focused, supportive psychotherapy.    Objective Observations:   Participation:Active verbal participation, Attentive, Engaged, and Open to  feedback   Grooming:Casual   Cognition:Alert and Fully Oriented   Eye Contact:Good   Mood: Content   Affect:Congruent with content   Thought Process:Logical and Goal-directed   Speech:Rate within normal limits and Volume within normal limits    Current Risk:   Suicide:  Not indicated   Homicide:  Not indicated   Self-Harm:  Not indicated   Relapse:  Not indicated   Safety Plan Reviewed: not applicable        Diagnosis:  1. Attention deficit disorder, unspecified hyperactivity presence    2. Major depressive disorder, recurrent episode, moderate (HCC)              Frances rGace LMSW, CSW-Intern

## 2024-04-27 LAB — VIT B1 BLD-MCNC: 98 NMOL/L (ref 70–180)

## 2024-04-29 ENCOUNTER — HOSPITAL ENCOUNTER (OUTPATIENT)
Dept: BEHAVIORAL HEALTH | Facility: MEDICAL CENTER | Age: 74
End: 2024-04-29
Attending: PSYCHIATRY & NEUROLOGY
Payer: MEDICARE

## 2024-04-29 DIAGNOSIS — F33.2 MDD (MAJOR DEPRESSIVE DISORDER), RECURRENT SEVERE, WITHOUT PSYCHOSIS (HCC): ICD-10-CM

## 2024-04-29 PROCEDURE — 90791 PSYCH DIAGNOSTIC EVALUATION: CPT | Performed by: MARRIAGE & FAMILY THERAPIST

## 2024-04-29 ASSESSMENT — PATIENT HEALTH QUESTIONNAIRE - PHQ9
SUM OF ALL RESPONSES TO PHQ QUESTIONS 1-9: 20
5. POOR APPETITE OR OVEREATING: 2 - MORE THAN HALF THE DAYS
CLINICAL INTERPRETATION OF PHQ2 SCORE: 6

## 2024-04-29 ASSESSMENT — ANXIETY QUESTIONNAIRES
4. TROUBLE RELAXING: SEVERAL DAYS
GAD7 TOTAL SCORE: 6
6. BECOMING EASILY ANNOYED OR IRRITABLE: SEVERAL DAYS
5. BEING SO RESTLESS THAT IT IS HARD TO SIT STILL: NOT AT ALL
2. NOT BEING ABLE TO STOP OR CONTROL WORRYING: SEVERAL DAYS
1. FEELING NERVOUS, ANXIOUS, OR ON EDGE: SEVERAL DAYS
7. FEELING AFRAID AS IF SOMETHING AWFUL MIGHT HAPPEN: NOT AT ALL
3. WORRYING TOO MUCH ABOUT DIFFERENT THINGS: MORE THAN HALF THE DAYS
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
## 2024-04-30 NOTE — ADDENDUM NOTE
Encounter addended by: BOBBY Gamez on: 4/29/2024 5:57 PM   Actions taken: Order list changed, Diagnosis association updated

## 2024-05-07 ENCOUNTER — TELEMEDICINE (OUTPATIENT)
Dept: BEHAVIORAL HEALTH | Facility: CLINIC | Age: 74
End: 2024-05-07
Payer: MEDICARE

## 2024-05-07 DIAGNOSIS — F33.2 MDD (MAJOR DEPRESSIVE DISORDER), RECURRENT SEVERE, WITHOUT PSYCHOSIS (HCC): ICD-10-CM

## 2024-05-07 DIAGNOSIS — F98.8 ATTENTION DEFICIT DISORDER, UNSPECIFIED HYPERACTIVITY PRESENCE: ICD-10-CM

## 2024-05-07 PROCEDURE — 90834 PSYTX W PT 45 MINUTES: CPT | Mod: 95

## 2024-05-07 NOTE — PROGRESS NOTES
[facility] Progress Note    Therapy was provided on this date in coordination with the [facility] approved Clinical Supervisor under the direct supervision of Dr. Chang who was on site during this visit.     Therapist reviewed informed consent, limits of confidentiality and [facility] policies; patient expressed understanding and agreed to voluntarily proceed with evaluation and treatment.    This visit was conducted via Zoom using secure and encrypted videoconferencing technology. The patient was in a private location in the [facility]. The patient's identity was confirmed and verbal consent was obtained for this virtual visit.  Place of Service: POS 10 -The patient is at Home during their visit in the [facility].    Name: Kristen Carrera  MRN: [id number]  : 1950  Age: 73 y.o.  Date of assessment: 2024  PCP: Binta To M.D.  Persons in attendance: Patient and MARYANNE Foreman-Intern  Total session time: 50 minutes    Topics addressed in psychotherapy include:     Data: Client was alert and oriented, presented calm. Client reported spending more time visiting great-grandkids. Client has decided not to participate in IOP.     Assessment: Client reported decision to not participate in IOP at this time, does not believe it will be relevant to her, wants to commit 3 hours, 3 days a week doing tasks at home instead. Client reported motivation level is 8/10 to commit to this plan. Client plans to make a list of chores to complete each week. Client continues to watch great-grandson throughout the week which has been enjoyable and client has been going on walks with him.     Plan: Supportive psychotherapy was provided. Plan of care is to continue with solution-focused, supportive therapy.    Objective Observations:   Participation:Active verbal participation, Attentive, Engaged, and Open to feedback   Grooming:Casual   Cognition:Alert and Fully Oriented   Eye  Contact:Good   Mood: Content   Affect:Congruent with content   Thought Process:Logical and Goal-directed   Speech:Rate within normal limits and Volume within normal limits    Current Risk:   Suicide:  Not indicated   Homicide:  Not indicated   Self-Harm:  Not indicated   Relapse:  Not indicated   Safety Plan Reviewed: Not applicable        Diagnosis:  1. MDD (major depressive disorder), recurrent severe, without psychosis (HCC)    2. Attention deficit disorder, unspecified hyperactivity presence              Frances Grace LMSW, CSW-Intern

## 2024-05-13 ENCOUNTER — TELEMEDICINE (OUTPATIENT)
Dept: BEHAVIORAL HEALTH | Facility: CLINIC | Age: 74
End: 2024-05-13
Payer: MEDICARE

## 2024-05-13 DIAGNOSIS — G47.10 HYPERSOMNIA: ICD-10-CM

## 2024-05-13 DIAGNOSIS — F33.2 MDD (MAJOR DEPRESSIVE DISORDER), RECURRENT SEVERE, WITHOUT PSYCHOSIS (HCC): ICD-10-CM

## 2024-05-13 DIAGNOSIS — F98.8 ATTENTION DEFICIT DISORDER, UNSPECIFIED HYPERACTIVITY PRESENCE: ICD-10-CM

## 2024-05-13 PROCEDURE — 90833 PSYTX W PT W E/M 30 MIN: CPT | Mod: 95 | Performed by: PSYCHIATRY & NEUROLOGY

## 2024-05-13 PROCEDURE — 99214 OFFICE O/P EST MOD 30 MIN: CPT | Mod: 95 | Performed by: PSYCHIATRY & NEUROLOGY

## 2024-05-13 ASSESSMENT — PATIENT HEALTH QUESTIONNAIRE - PHQ9
5. POOR APPETITE OR OVEREATING: 3 - NEARLY EVERY DAY
SUM OF ALL RESPONSES TO PHQ QUESTIONS 1-9: 18
CLINICAL INTERPRETATION OF PHQ2 SCORE: 6
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
## 2024-05-16 ENCOUNTER — TELEPHONE (OUTPATIENT)
Dept: NEUROLOGY | Facility: MEDICAL CENTER | Age: 74
End: 2024-05-16
Payer: MEDICARE

## 2024-05-21 ENCOUNTER — OFFICE VISIT (OUTPATIENT)
Dept: BEHAVIORAL HEALTH | Facility: CLINIC | Age: 74
End: 2024-05-21
Payer: MEDICARE

## 2024-05-21 DIAGNOSIS — F98.8 ATTENTION DEFICIT DISORDER, UNSPECIFIED HYPERACTIVITY PRESENCE: ICD-10-CM

## 2024-05-21 DIAGNOSIS — F33.2 MDD (MAJOR DEPRESSIVE DISORDER), RECURRENT SEVERE, WITHOUT PSYCHOSIS (HCC): ICD-10-CM

## 2024-05-21 PROCEDURE — 90834 PSYTX W PT 45 MINUTES: CPT

## 2024-05-21 NOTE — PROGRESS NOTES
[facility] Progress Note      Therapy was provided on this date in coordination with the [facility] approved Clinical Supervisor under the direct supervision of Dr. Chang who was on site during this visit.    Therapist reviewed informed consent, limits of confidentiality and [facility] policies; patient expressed understanding and agreed to voluntarily proceed with evaluation and treatment.    Name: Kristen Carrera  MRN: [id number]  : 1950  Age: 73 y.o.  Date of assessment: 2024  PCP: Binta To M.D.  Persons in attendance: Patient Frances Grace, CSW-Intern and Jeanna Banuelos, W-Intern  Total session time: 50 minutes      Topics addressed in psychotherapy include:     Data: Client was alert and oriented, presented nonchalant. Client has been spending 4 days a week taking care of her grandson.    Assessment: Client reported she has been more active, out of her apartment more frequently due to watching her grandson 4 days a week and will often stay the night at her daughter's house. Client expressed feelings of gordy, excitement, and happiness about spending more time with grandson as she enjoys helping him meet mile stones and watching his development. Client has organized some of her apartment. Client reported no noticeable change in overall mood or motivation outside of childcare duties. Client is pending clearance from neurologist to pursue TMS.    Plan: Supportive psychotherapy was provided. Plan of care is to continue with solution-focused, supportive therapy.    Objective Observations:   Participation:Active verbal participation, Attentive, Engaged, and Open to feedback   Grooming:Casual   Cognition:Alert and Fully Oriented   Eye Contact:Good   Mood: Content   Affect:Congruent with content   Thought Process:Logical and Goal-directed   Speech:Rate within normal limits and Volume within normal limits    Current Risk:   Suicide:  Not  indicated   Homicide:  Not indicated   Self-Harm:  Not indicated    Relapse:  Not indicated    Safety Plan Reviewed: not applicable        Diagnosis:  1. MDD (major depressive disorder), recurrent severe, without psychosis (HCC)    2. Attention deficit disorder, unspecified hyperactivity presence            Frances Grace LMSW, CSW-Intern

## 2024-06-04 ENCOUNTER — TELEMEDICINE (OUTPATIENT)
Dept: BEHAVIORAL HEALTH | Facility: CLINIC | Age: 74
End: 2024-06-04
Payer: MEDICARE

## 2024-06-04 DIAGNOSIS — F90.0 ATTENTION DEFICIT HYPERACTIVITY DISORDER (ADHD), PREDOMINANTLY INATTENTIVE TYPE: ICD-10-CM

## 2024-06-04 DIAGNOSIS — G47.10 HYPERSOMNIA: ICD-10-CM

## 2024-06-04 DIAGNOSIS — F33.2 MDD (MAJOR DEPRESSIVE DISORDER), RECURRENT SEVERE, WITHOUT PSYCHOSIS (HCC): ICD-10-CM

## 2024-06-04 DIAGNOSIS — F98.8 ATTENTION DEFICIT DISORDER, UNSPECIFIED HYPERACTIVITY PRESENCE: ICD-10-CM

## 2024-06-04 PROCEDURE — 99999 PR NO CHARGE: CPT | Mod: 95

## 2024-06-04 NOTE — PROGRESS NOTES
[facility]   Therapy Progress Note    Therapy was provided on this date in coordination with the [facility] approved Clinical Supervisor under the direct supervision of Dr. Chang who was on site during this visit.     Therapist reviewed informed consent, limits of confidentiality and [facility] policies; patient expressed understanding and agreed to voluntarily proceed with evaluation and treatment.    This visit was conducted via Zoom using secure and encrypted videoconferencing technology. The patient was in a private location in the [facility]. The patient's identity was confirmed and verbal consent was obtained for this virtual visit.  Place of Service: POS 10 -The patient is at Home during their visit in the [facility].    Name: Kristen Carrera  MRN: [id number]  : 1950  Age: 73 y.o.  Date of assessment: 2024  PCP: Binta To M.D.  Persons in attendance: Patient and MARYANNE Foreman-Intern  Total session time:  minutes    Topics addressed in psychotherapy include:     Data: Client was alert and oriented, presented mildly depressed. Client feels disheartened by ongoing lack of motivation or interest to engage in previously enjoyed activities, describes emotional numbness.     Assessment: Client reported intention to clean up and organize her apartment in anticipation of a pending inspection from . Client reported continued feelings of depression, loss of motivation and little interest in doing things. Client has spoken with her psychiatrist and been working on clearance from neurology for Esketamine treatment, is not interested in TMS or IOP at this time. Client continues to watch great-grandson, has found this helpful in motivating her to leave the apartment and socialize with family. Client is planning to accompany daughter on a cruise to  in August, will get to visit with an old friend who lives there, but reported no feelings of  excitement and would be content not going. Client reported feeling sad, especially considering the lack of closeness with her daughter. Discussed ways client can reconnect and develop a closer relationship. Client was receptive.      Plan: Supportive psychotherapy was provided. Plan of care is to improve mood and increase motivation using solution focused supportive therapy.     Objective Observations:   Participation:Active verbal participation, Attentive, Engaged, and Open to feedback   Grooming:Casual   Cognition:Alert and Fully Oriented   Eye Contact:Good   Mood:Depressed   Affect:Congruent with content and Sad   Thought Process:Logical and Goal-directed   Speech:Rate within normal limits and Volume within normal limits    Current Risk:   Suicide:  Not indicated   Homicide:  Not indicated   Self-Harm:  Not indicated   Relapse:  Not indicated   Safety Plan Reviewed: not applicable        Diagnosis:  1. MDD (major depressive disorder), recurrent severe, without psychosis (HCC)    2. Attention deficit disorder, unspecified hyperactivity presence              Frances Grace, ANGIE, CSW-Intern

## 2024-06-05 RX ORDER — DEXTROAMPHETAMINE SACCHARATE, AMPHETAMINE ASPARTATE MONOHYDRATE, DEXTROAMPHETAMINE SULFATE AND AMPHETAMINE SULFATE 7.5; 7.5; 7.5; 7.5 MG/1; MG/1; MG/1; MG/1
CAPSULE, EXTENDED RELEASE ORAL
Qty: 30 CAPSULE | Refills: 0 | Status: SHIPPED | OUTPATIENT
Start: 2024-06-05 | End: 2024-06-12 | Stop reason: SDUPTHER

## 2024-06-12 DIAGNOSIS — F90.0 ATTENTION DEFICIT HYPERACTIVITY DISORDER (ADHD), PREDOMINANTLY INATTENTIVE TYPE: ICD-10-CM

## 2024-06-12 DIAGNOSIS — G47.10 HYPERSOMNIA: ICD-10-CM

## 2024-06-12 RX ORDER — DEXTROAMPHETAMINE SACCHARATE, AMPHETAMINE ASPARTATE MONOHYDRATE, DEXTROAMPHETAMINE SULFATE AND AMPHETAMINE SULFATE 7.5; 7.5; 7.5; 7.5 MG/1; MG/1; MG/1; MG/1
CAPSULE, EXTENDED RELEASE ORAL
Qty: 30 CAPSULE | Refills: 0 | Status: SHIPPED | OUTPATIENT
Start: 2024-06-12 | End: 2024-06-17 | Stop reason: SDUPTHER

## 2024-06-12 NOTE — TELEPHONE ENCOUNTER
[facility] out of stock. Can you please send Adderall to [facility]?    Received request via: Patient    Was the patient seen in the last year in this department? Yes    Does the patient have an active prescription (recently filled or refills available) for medication(s) requested? No    Pharmacy Name: [facility]    Does the patient have correction Plus and need 100 day supply (blood pressure, diabetes and cholesterol meds only)? Medication is not for cholesterol, blood pressure or diabetes and Patient does not have SCP

## 2024-06-12 NOTE — TELEPHONE ENCOUNTER
[facility] is out of the Adderall can we please send it to the [facility] [address] please.    Follow up on 6/17/2024. SS

## 2024-06-17 ENCOUNTER — TELEMEDICINE (OUTPATIENT)
Dept: BEHAVIORAL HEALTH | Facility: CLINIC | Age: 74
End: 2024-06-17
Payer: MEDICARE

## 2024-06-17 DIAGNOSIS — F90.0 ATTENTION DEFICIT HYPERACTIVITY DISORDER (ADHD), PREDOMINANTLY INATTENTIVE TYPE: ICD-10-CM

## 2024-06-17 DIAGNOSIS — G47.09 OTHER INSOMNIA: ICD-10-CM

## 2024-06-17 DIAGNOSIS — F33.2 MDD (MAJOR DEPRESSIVE DISORDER), RECURRENT SEVERE, WITHOUT PSYCHOSIS (HCC): ICD-10-CM

## 2024-06-17 DIAGNOSIS — G47.10 HYPERSOMNIA: ICD-10-CM

## 2024-06-17 DIAGNOSIS — R41.3 COMPLAINTS OF MEMORY DISTURBANCE: ICD-10-CM

## 2024-06-17 DIAGNOSIS — G47.33 OSA (OBSTRUCTIVE SLEEP APNEA): ICD-10-CM

## 2024-06-17 PROCEDURE — 99214 OFFICE O/P EST MOD 30 MIN: CPT | Mod: 95 | Performed by: PSYCHIATRY & NEUROLOGY

## 2024-06-17 PROCEDURE — 90833 PSYTX W PT W E/M 30 MIN: CPT | Mod: 95 | Performed by: PSYCHIATRY & NEUROLOGY

## 2024-06-17 RX ORDER — DEXTROAMPHETAMINE SACCHARATE, AMPHETAMINE ASPARTATE MONOHYDRATE, DEXTROAMPHETAMINE SULFATE AND AMPHETAMINE SULFATE 7.5; 7.5; 7.5; 7.5 MG/1; MG/1; MG/1; MG/1
CAPSULE, EXTENDED RELEASE ORAL
Qty: 30 CAPSULE | Refills: 0 | Status: SHIPPED | OUTPATIENT
Start: 2024-07-11 | End: 2024-07-17

## 2024-06-17 RX ORDER — TRAZODONE HYDROCHLORIDE 100 MG/1
150 TABLET ORAL NIGHTLY PRN
Qty: 135 TABLET | Refills: 1 | Status: SHIPPED | OUTPATIENT
Start: 2024-06-17
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
## 2024-06-18 ENCOUNTER — TELEMEDICINE (OUTPATIENT)
Dept: BEHAVIORAL HEALTH | Facility: CLINIC | Age: 74
End: 2024-06-18
Payer: MEDICARE

## 2024-06-18 DIAGNOSIS — F33.2 MDD (MAJOR DEPRESSIVE DISORDER), RECURRENT SEVERE, WITHOUT PSYCHOSIS (HCC): ICD-10-CM

## 2024-06-18 DIAGNOSIS — F90.0 ATTENTION DEFICIT HYPERACTIVITY DISORDER (ADHD), PREDOMINANTLY INATTENTIVE TYPE: ICD-10-CM

## 2024-06-18 PROCEDURE — 90834 PSYTX W PT 45 MINUTES: CPT | Mod: 95

## 2024-06-18 NOTE — PROGRESS NOTES
[facility]   Therapy Progress Note      Therapy was provided on this date in coordination with the [facility] approved Clinical Supervisor under the direct supervision of Dr. Chang who was on site during this visit.    Therapist reviewed informed consent, limits of confidentiality and [facility] policies; patient expressed understanding and agreed to voluntarily proceed with evaluation and treatment.    Name: Kristen Carrera  MRN: [id number]  : 1950  Age: 73 y.o.  Date of assessment: 2024  PCP: Binta To M.D.  Persons in attendance: Patient and MARYANNE Foreman-Intern  Total session time: 50 minutes      Topics addressed in psychotherapy include:     Data: Client was alert and oriented, presented anxious and tired. Client is experiencing issues from apartment management.     Assessment: Client reported continuing to experience low mood and lack of motivation. Client started organizing and cleaning her apartment in preparation for apartment inspection, failed but was given an extension to clear up the apartment for re-inspection in a month. Client is optimistic she will be able to organize enough with the extension. Client reported she also received an eviction notice for past due fees, which client paid but reported ongoing anxiety from the apartment management. Encouraged client to request invoices to see breakdown of charges and address ongoing frustration with inconsistent fees. Client reported dealing with a pulled muscle which has limited her mobility and has been experiencing ongoing shoulder pain. Client continues to watch her great-grandson, enjoys her time with him, reported feeling useful and has a sense of purpose. Encouraged client to rest when needed and not overexert her body and talk to her dr about increased pain. Client was receptive.     Plan: Supportive psychotherapy was provided. Plan of care is to improve mood and increase motivation using solution  focused supportive therapy.     Objective Observations:   Participation:Active verbal participation, Attentive, Engaged, and Open to feedback   Grooming:Casual   Cognition:Alert and Fully Oriented   Eye Contact:Good   Mood:Anxious   Affect:Congruent with content and Anxious   Thought Process:Logical and Goal-directed   Speech:Rate within normal limits and Volume within normal limits    Current Risk:   Suicide:  Not indicated   Homicide:  Not indicated   Self-Harm:  Not indicated    Relapse:  Not indicated    Safety Plan Reviewed: not applicable        Diagnosis:  1. MDD (major depressive disorder), recurrent severe, without psychosis (HCC)    2. Attention deficit hyperactivity disorder (ADHD), predominantly inattentive type            Frances Grace LMSW, CSW-Intern

## 2024-06-18 NOTE — PROGRESS NOTES
Placed referral to Psychiatry for Esketamine given the [facility] clinic has a long wait time to be seen.

## 2024-06-24 ENCOUNTER — OFFICE VISIT (OUTPATIENT)
Dept: BEHAVIORAL HEALTH | Facility: CLINIC | Age: 74
End: 2024-06-24
Payer: MEDICARE

## 2024-06-24 VITALS
SYSTOLIC BLOOD PRESSURE: 130 MMHG | DIASTOLIC BLOOD PRESSURE: 79 MMHG | OXYGEN SATURATION: 98 % | HEART RATE: 97 BPM | WEIGHT: 135.6 LBS | HEIGHT: 62 IN | BODY MASS INDEX: 24.95 KG/M2

## 2024-06-24 DIAGNOSIS — G47.10 HYPERSOMNIA: ICD-10-CM

## 2024-06-24 DIAGNOSIS — F33.2 MDD (MAJOR DEPRESSIVE DISORDER), RECURRENT SEVERE, WITHOUT PSYCHOSIS (HCC): ICD-10-CM

## 2024-06-24 PROBLEM — F32.4 MAJOR DEPRESSIVE DISORDER IN PARTIAL REMISSION (HCC): Status: RESOLVED | Noted: 2023-07-03 | Resolved: 2024-06-24

## 2024-06-24 PROBLEM — F10.11 H/O ALCOHOL ABUSE: Status: RESOLVED | Noted: 2023-06-29 | Resolved: 2024-06-24

## 2024-06-24 PROCEDURE — 99215 OFFICE O/P EST HI 40 MIN: CPT | Performed by: PSYCHIATRY & NEUROLOGY

## 2024-06-24 PROCEDURE — 3078F DIAST BP <80 MM HG: CPT | Performed by: PSYCHIATRY & NEUROLOGY

## 2024-06-24 PROCEDURE — G2212 PROLONG OUTPT/OFFICE VIS: HCPCS | Performed by: PSYCHIATRY & NEUROLOGY

## 2024-06-24 PROCEDURE — 3075F SYST BP GE 130 - 139MM HG: CPT | Performed by: PSYCHIATRY & NEUROLOGY

## 2024-06-24 RX ORDER — METHOCARBAMOL 750 MG/1
TABLET, FILM COATED ORAL
COMMUNITY
Start: 2024-05-02 | End: 2024-06-24

## 2024-06-24 RX ORDER — METHYLPREDNISOLONE 4 MG/1
TABLET ORAL
COMMUNITY
Start: 2024-05-02 | End: 2024-06-24

## 2024-06-24 ASSESSMENT — PATIENT HEALTH QUESTIONNAIRE - PHQ9
CLINICAL INTERPRETATION OF PHQ2 SCORE: 6
5. POOR APPETITE OR OVEREATING: 3 - NEARLY EVERY DAY
SUM OF ALL RESPONSES TO PHQ QUESTIONS 1-9: 21

## 2024-06-24 ASSESSMENT — ANXIETY QUESTIONNAIRES
6. BECOMING EASILY ANNOYED OR IRRITABLE: SEVERAL DAYS
1. FEELING NERVOUS, ANXIOUS, OR ON EDGE: SEVERAL DAYS
3. WORRYING TOO MUCH ABOUT DIFFERENT THINGS: MORE THAN HALF THE DAYS
IF YOU CHECKED OFF ANY PROBLEMS ON THIS QUESTIONNAIRE, HOW DIFFICULT HAVE THESE PROBLEMS MADE IT FOR YOU TO DO YOUR WORK, TAKE CARE OF THINGS AT HOME, OR GET ALONG WITH OTHER PEOPLE: EXTREMELY DIFFICULT
4. TROUBLE RELAXING: MORE THAN HALF THE DAYS
5. BEING SO RESTLESS THAT IT IS HARD TO SIT STILL: NOT AT ALL
2. NOT BEING ABLE TO STOP OR CONTROL WORRYING: MORE THAN HALF THE DAYS
GAD7 TOTAL SCORE: 8
7. FEELING AFRAID AS IF SOMETHING AWFUL MIGHT HAPPEN: NOT AT ALL

## 2024-06-24 ASSESSMENT — FIBROSIS 4 INDEX: FIB4 SCORE: 1.56

## 2024-06-24 NOTE — PROGRESS NOTES
PSYCHIATRY ESKETAMINE OR SPRAVATO EVALUATION     Chief Complaint   Patient presents with    Other     Esketamine evaluation      History Of Present Illness:  Kristen Carrera is a 73 y.o. female with major depressive disorder, ADHD, hypertension, sleep apnea on BiPAP, low back pain, dyslipidemia, osteoporosis, osteoarthritis referred by Dr. Carrion for Esketamine treatment evaluation.  She has struggled with depression since her 50s and her current episode of depression has been going on for over a year.  She describes her depression as low mood, anhedonia, low motivation, low energy, poor appetite, difficulty staying focused on task at hand and poor sleep.  She does have some passive thoughts of death here and there but denies dwelling on those thoughts or having active thoughts, intent or plan of wanting to hurt herself.  She reports her grandkids and great grandkids as her biggest protective factor.  She mentions that over the years she has tried multiple antidepressant medications without any efficacy.  She tried an online service and got some sublingual ketamine over a year ago.  She did 2-3 times but does not recall any efficacy or side effects.  She was unable to continue treatments as she did not notice any efficacy in financial reasons.  She endorses hypersomnolence to the point where she can sleep for up to 24 hours.  She has been prescribed Adderall XR 30 mg which does help her stay up during daytime.  She also takes Trazodone 150 mg at bedtime for sleep.    Social History:   She is single,  and  x 1, 1 daughter, lives alone in [place].    Substance Use:  Alcohol - Infrequent, 1-2 times a month  Nicotine - Denies   Cannabis - Infrequent, smokes once every few months  Illicit drugs - She reports history of methamphetamine use for 17+ years, last use in 2009.    Past Medication Trials (per patient and chart review):  Zoloft, Wellbutrin, ? Effexor, Cymbalta, Pristiq x years (ineffective), Remeron,  "Abilify, Ambien    Medications:  Current Outpatient Medications   Medication Sig Dispense Refill    traZODone (DESYREL) 100 MG Tab Take 1.5 Tablets by mouth at bedtime as needed for Sleep. 135 Tablet 1    [START ON 7/11/2024] amphetamine-dextroamphetamine ER (ADDERALL XR) 30 MG XR capsule TAKE 1 CAPSULE BY MOUTH IN THE MORNING FOR 30 DAYS 30 Capsule 0    aspirin 81 MG EC tablet Take 1 Tablet by mouth every day. 100 Tablet 3    lisinopril (PRINIVIL) 20 MG Tab Take 1 Tablet by mouth every day. 100 Tablet 3    rosuvastatin (CRESTOR) 20 MG Tab TAKE 1 TABLET BY MOUTH EVERY EVENING 100 Tablet 3    alendronate (FOSAMAX) 70 MG Tab Take 1 Tablet by mouth every 7 days. 12 Tablet 3    MAGNESIUM PO       tolterodine ER (DETROL LA) 4 MG CAPSULE SR 24 HR       Calcium Carbonate-Vitamin D (CALCIUM-D PO) Take 2 Tablets by mouth every day.       No current facility-administered medications for this visit.     Review Of Systems:    Constitutional - Positive for fatigue  Psychiatric - Positive for depression    Physical Examination:  Vital signs: /79 (BP Location: Right arm, Patient Position: Sitting)   Pulse 97   Ht 1.575 m (5' 2\")   Wt 61.5 kg (135 lb 9.6 oz)   SpO2 98%   BMI 24.80 kg/m²     Musculoskeletal: Normal gait. No abnormal movements.     Mental Status Evaluation:   General: Elderly female, dressed in casual attire, good grooming and hygiene, in no apparent distress, calm and cooperative, good eye contact, no psychomotor agitation or retardation  Orientation: Alert and oriented to person, place and time  Recent and remote memory: Impaired, difficulty remembering timelines  Attention span and concentration: Grossly intact  Speech: Spontaneous, normal rate, rhythm and tone  Thought Process: Linear, logical and goal directed  Thought Content: Denies suicidal or homicidal ideations, intent or plan  Perception: Denies auditory or visual hallucinations. No delusions noted  Associations: Intact  Language: " "Appropriate  Fund of knowledge and vocabulary: Grossly adequate  Mood: \"I have been good and I have been worse\"  Affect: Dysphoric, mood congruent  Insight: Good  Judgment: Good    Depression screenin/29/2024     3:00 PM 2024    11:30 AM 2024     1:30 PM   Depression Screen (PHQ-2/PHQ-9)   PHQ-2 Total Score 6 6 6   PHQ-9 Total Score 20 18 21     Interpretation of PHQ-9 Total Score   Score Severity   1-4 No Depression   5-9 Mild Depression   10-14 Moderate Depression   15-19 Moderately Severe Depression   20-27 Severe Depression    Anxiety screenin/29/2024     3:52 PM 2024     1:42 PM   PAT 7   PAT-7 Total Score 6 8     Interpretation of PAT 7 Total Score   Score Severity:  0-4 No Anxiety   5-9 Mild Anxiety  10-14 Moderate Anxiety  15-21 Severe Anxiety    Medical Records/Labs/Diagnostic Tests Reviewed:  [place] PDMP records - appropriate refills      Impression:  1.  Major depressive disorder, recurrent, severe, without psychotic symptoms  2.  Hypersomnolence    Plan:  1.  Start intranasal Esketamine treatment for severe treatment-resistant major depressive disorder.  Discussed Esketamine dosing schedule:  Induction phase (week 1-4, administer twice weekly) - starting dose 56 mg on day 1 and/or 2, subsequent doses 56 or 84 mg as tolerated  Maintenance phase (week 5-8, administer once weekly) - 56 or 84 mg    Discussed common side effects including worsening anxiety, euphoric mood, headache, elevated blood pressure, respiratory depression, nausea/vomiting, sedation/somnolence, dizziness, vertigo, dissociation, cognitive impairment including impaired attention and judgment, impaired ability to drive and operate machinery, hypoesthesia - decrease in normal sensations such as touch or temperature, potential for abuse/misuse/dependence etc.    Avoid food for at least 2 hours before administration and liquids at least 30 minutes prior to Esketamine administration to reduce risk of " nausea/vomiting.    Avoid nasal decongestant or nasal corticosteroid at least 1 hour before Esketamine administration     There are no major contraindication for Esketamine treatment including aneurysmal vascular disease (including thoracic and abdominal aorta, intracranial and peripheral arterial vessels), arteriovenous malformation or history of intracerebral hemorrhage.    Esketamine will be administered intranasally in the clinic and patient will be monitored for at least 2 hours after administration.  Patient will need to arrange transportation home following treatment.     2.  She is on Adderall XR 30 mg in the morning for hypersomnolence.  I have advised her to skip Adderall XR on Esketamine treatment days.    Return to clinic - when Esketamine prior authorization is approved from insurance and medication has been delivered by pharmacy    The proposed treatment plan was discussed with the patient who was provided the opportunity to ask questions and make suggestions regarding alternative treatment. Patient verbalized understanding and expressed agreement with the plan.     Total time spent on the day of encounter: 60 minutes. Reviewing prior psychiatric records, talking to patient, discussing depression and Esketamine, form and chart completion.    Sara Cantor M.D.  06/24/24    This note was created using voice recognition software (Dragon). The accuracy of the dictation is limited by the abilities of the software. I have reviewed the note prior to signing, however some errors in grammar and context are still possible. If you have any questions related to this note please do not hesitate to contact our office.

## 2024-07-24 ENCOUNTER — TELEMEDICINE (OUTPATIENT)
Dept: BEHAVIORAL HEALTH | Facility: CLINIC | Age: 74
End: 2024-07-24
Payer: MEDICARE

## 2024-07-24 DIAGNOSIS — F33.2 MDD (MAJOR DEPRESSIVE DISORDER), RECURRENT SEVERE, WITHOUT PSYCHOSIS (HCC): ICD-10-CM

## 2024-07-24 DIAGNOSIS — F90.0 ATTENTION DEFICIT HYPERACTIVITY DISORDER (ADHD), PREDOMINANTLY INATTENTIVE TYPE: ICD-10-CM

## 2024-07-24 PROCEDURE — 90833 PSYTX W PT W E/M 30 MIN: CPT | Performed by: PSYCHIATRY & NEUROLOGY

## 2024-07-24 PROCEDURE — 99214 OFFICE O/P EST MOD 30 MIN: CPT | Performed by: PSYCHIATRY & NEUROLOGY

## 2024-07-24 RX ORDER — DEXTROAMPHETAMINE SACCHARATE, AMPHETAMINE ASPARTATE MONOHYDRATE, DEXTROAMPHETAMINE SULFATE AND AMPHETAMINE SULFATE 7.5; 7.5; 7.5; 7.5 MG/1; MG/1; MG/1; MG/1
30 CAPSULE, EXTENDED RELEASE ORAL 2 TIMES DAILY
Qty: 60 CAPSULE | Refills: 0 | Status: SHIPPED | OUTPATIENT
Start: 2024-08-10 | End: 2024-09-09

## 2024-07-24 RX ORDER — BUPROPION HYDROCHLORIDE 100 MG/1
100 TABLET ORAL 2 TIMES DAILY
Qty: 180 TABLET | Refills: 1 | Status: SHIPPED | OUTPATIENT
Start: 2024-07-24

## 2024-07-31 ENCOUNTER — HOSPITAL ENCOUNTER (OUTPATIENT)
Dept: LAB | Facility: MEDICAL CENTER | Age: 74
End: 2024-07-31
Attending: PSYCHIATRY & NEUROLOGY
Payer: MEDICARE

## 2024-07-31 DIAGNOSIS — F90.0 ATTENTION DEFICIT HYPERACTIVITY DISORDER (ADHD), PREDOMINANTLY INATTENTIVE TYPE: ICD-10-CM

## 2024-07-31 PROCEDURE — 80307 DRUG TEST PRSMV CHEM ANLYZR: CPT

## 2024-07-31 PROCEDURE — G0480 DRUG TEST DEF 1-7 CLASSES: HCPCS

## 2024-08-02 LAB
AMPHET CTO UR CFM-MCNC: NORMAL NG/ML
BARBITURATES CTO UR CFM-MCNC: NEGATIVE NG/ML
BENZODIAZ CTO UR CFM-MCNC: NEGATIVE NG/ML
CANNABINOIDS CTO UR CFM-MCNC: NORMAL NG/ML
COCAINE CTO UR CFM-MCNC: NEGATIVE NG/ML
CREAT UR-MCNC: 192.9 MG/DL (ref 20–400)
DRUG COMMENT 753798: NORMAL
METHADONE CTO UR CFM-MCNC: NEGATIVE NG/ML
OPIATES CTO UR CFM-MCNC: NEGATIVE NG/ML
PCP CTO UR CFM-MCNC: NEGATIVE NG/ML
PROPOXYPH CTO UR CFM-MCNC: NEGATIVE NG/ML

## 2024-08-04 LAB
AMPHET UR CFM-MCNC: >5000 NG/ML
MDA UR CFM-MCNC: <200 NG/ML
MDEA UR CFM-MCNC: <200 NG/ML
MDMA UR CFM-MCNC: <200 NG/ML
METHAMPHET UR CFM-MCNC: <200 NG/ML
PHENTERMINE UR CFM-MCNC: <200 NG/ML
THC UR CFM-MCNC: 25 NG/ML

## 2024-08-05 ENCOUNTER — APPOINTMENT (OUTPATIENT)
Dept: BEHAVIORAL HEALTH | Facility: CLINIC | Age: 74
End: 2024-08-05
Payer: MEDICARE

## 2024-08-05 DIAGNOSIS — F33.2 MDD (MAJOR DEPRESSIVE DISORDER), RECURRENT SEVERE, WITHOUT PSYCHOSIS (HCC): ICD-10-CM

## 2024-08-05 DIAGNOSIS — F90.0 ATTENTION DEFICIT HYPERACTIVITY DISORDER (ADHD), PREDOMINANTLY INATTENTIVE TYPE: ICD-10-CM

## 2024-08-05 NOTE — PROGRESS NOTES
[facility] Therapy Progress Note      Therapy was provided on this date in coordination with the [facility] approved Clinical Supervisor under the direct supervision of Dr. Chang who was on site during this visit.    Therapist reviewed informed consent, limits of confidentiality and [facility] policies; patient expressed understanding and agreed to voluntarily proceed with evaluation and treatment.    Name: CONCHITA CHAVEZ  MRN: [id number]  : 1950  Age: 74 y.o.  Date of assessment: 2024  PCP: Binta To M.D.  Persons in attendance: Patient and MARYANNE Foreman-Intern  Total session time: *** minutes      Topics addressed in psychotherapy include:     Data: Client was alert and oriented, presented     Assessment:     Plan: Supportive psychotherapy was provided. Plan of care is to improve mood and increase motivation using solution focused supportive therapy.     Objective Observations:   Participation:Active verbal participation, Attentive, Engaged, and Open to feedback   Grooming:Casual   Cognition:Alert and Fully Oriented   Eye Contact:Good   Mood:{[facility] MOOD:32919374}   Affect:{[facility] AFFECT:[id number]}   Thought Process:{[facility] THOUGHT PROCESS:[id number]}   Speech:{[facility] SPEECH:[id number]}    Current Risk:   Suicide:  Not indicated   Homicide:  Not indicated   Self-Harm:  Not indicated    Relapse:  Not indicated    Safety Plan Reviewed: not applicable        Diagnosis:  1. Attention deficit hyperactivity disorder (ADHD), predominantly inattentive type    2. MDD (major depressive disorder), recurrent severe, without psychosis (HCC)            Frnaces Grace LMSW, CSW-Intern

## 2024-09-04 ENCOUNTER — TELEPHONE (OUTPATIENT)
Dept: SLEEP MEDICINE | Facility: MEDICAL CENTER | Age: 74
End: 2024-09-04
Payer: MEDICARE

## 2024-09-04 NOTE — TELEPHONE ENCOUNTER
09-04-24 1st NO SHOW  Date of No Show: 9/3/24  Provider: Ashwin Greenfield  Reason For Visit: FV/MAX  Outcome of call:  no answer LVM.  Left call back for scheduling [phone].

## 2024-09-09 ENCOUNTER — TELEPHONE (OUTPATIENT)
Dept: SCHEDULING | Facility: IMAGING CENTER | Age: 74
End: 2024-09-09
Payer: MEDICARE

## 2024-09-09 DIAGNOSIS — M81.0 AGE-RELATED OSTEOPOROSIS WITHOUT CURRENT PATHOLOGICAL FRACTURE: ICD-10-CM

## 2024-09-09 DIAGNOSIS — F90.0 ATTENTION DEFICIT HYPERACTIVITY DISORDER (ADHD), PREDOMINANTLY INATTENTIVE TYPE: ICD-10-CM

## 2024-09-09 DIAGNOSIS — F33.2 MDD (MAJOR DEPRESSIVE DISORDER), RECURRENT SEVERE, WITHOUT PSYCHOSIS (HCC): ICD-10-CM

## 2024-09-09 RX ORDER — DEXTROAMPHETAMINE SACCHARATE, AMPHETAMINE ASPARTATE MONOHYDRATE, DEXTROAMPHETAMINE SULFATE AND AMPHETAMINE SULFATE 7.5; 7.5; 7.5; 7.5 MG/1; MG/1; MG/1; MG/1
30 CAPSULE, EXTENDED RELEASE ORAL 2 TIMES DAILY
Qty: 60 CAPSULE | Refills: 0 | Status: SHIPPED | OUTPATIENT
Start: 2024-09-09 | End: 2024-09-18 | Stop reason: SDUPTHER

## 2024-09-09 NOTE — TELEPHONE ENCOUNTER
** VERNON Carrion patient**  Caller: CONCHITA CHAVEZ     Topic/issue: patient stated she currently has 4 days of her rx of amphetamine-dextroamphetamine ER (ADDERALL XR) 30 MG and is requesting a refill due to Dr. Carrion next available apt not till 10/08/2024     Callback Number: [phone]     Thank you  Carina DOWNS

## 2024-09-09 NOTE — TELEPHONE ENCOUNTER
Received request via: Patient    Was the patient seen in the last year in this department? Yes    Does the patient have an active prescription (recently filled or refills available) for medication(s) requested? No    Pharmacy Name: [facility]

## 2024-09-18 DIAGNOSIS — F33.2 MDD (MAJOR DEPRESSIVE DISORDER), RECURRENT SEVERE, WITHOUT PSYCHOSIS (HCC): ICD-10-CM

## 2024-09-18 DIAGNOSIS — F90.0 ATTENTION DEFICIT HYPERACTIVITY DISORDER (ADHD), PREDOMINANTLY INATTENTIVE TYPE: ICD-10-CM

## 2024-09-18 RX ORDER — DEXTROAMPHETAMINE SACCHARATE, AMPHETAMINE ASPARTATE MONOHYDRATE, DEXTROAMPHETAMINE SULFATE AND AMPHETAMINE SULFATE 7.5; 7.5; 7.5; 7.5 MG/1; MG/1; MG/1; MG/1
30 CAPSULE, EXTENDED RELEASE ORAL 2 TIMES DAILY
Qty: 60 CAPSULE | Refills: 0 | Status: SHIPPED | OUTPATIENT
Start: 2024-10-14 | End: 2024-11-13

## 2024-10-11 ENCOUNTER — OFFICE VISIT (OUTPATIENT)
Dept: MEDICAL GROUP | Facility: MEDICAL CENTER | Age: 74
End: 2024-10-11
Attending: INTERNAL MEDICINE
Payer: MEDICARE

## 2024-10-11 VITALS
DIASTOLIC BLOOD PRESSURE: 78 MMHG | SYSTOLIC BLOOD PRESSURE: 128 MMHG | HEART RATE: 85 BPM | TEMPERATURE: 97.5 F | WEIGHT: 135.8 LBS | BODY MASS INDEX: 24.06 KG/M2 | HEIGHT: 63 IN | OXYGEN SATURATION: 97 % | RESPIRATION RATE: 16 BRPM

## 2024-10-11 DIAGNOSIS — M25.552 BILATERAL HIP PAIN: ICD-10-CM

## 2024-10-11 DIAGNOSIS — G89.29 CHRONIC PAIN OF LEFT ANKLE: ICD-10-CM

## 2024-10-11 DIAGNOSIS — M25.551 BILATERAL HIP PAIN: ICD-10-CM

## 2024-10-11 DIAGNOSIS — G89.29 CHRONIC PAIN OF BOTH SHOULDERS: ICD-10-CM

## 2024-10-11 DIAGNOSIS — G31.84 MILD COGNITIVE IMPAIRMENT WITH MEMORY LOSS: ICD-10-CM

## 2024-10-11 DIAGNOSIS — M25.512 CHRONIC PAIN OF BOTH SHOULDERS: ICD-10-CM

## 2024-10-11 DIAGNOSIS — R05.2 SUBACUTE COUGH: ICD-10-CM

## 2024-10-11 DIAGNOSIS — M25.572 CHRONIC PAIN OF LEFT ANKLE: ICD-10-CM

## 2024-10-11 DIAGNOSIS — M19.049 CMC ARTHRITIS: ICD-10-CM

## 2024-10-11 DIAGNOSIS — M25.511 CHRONIC PAIN OF BOTH SHOULDERS: ICD-10-CM

## 2024-10-11 PROBLEM — L98.9 SKIN LESION: Status: RESOLVED | Noted: 2023-12-14 | Resolved: 2024-10-11

## 2024-10-11 PROBLEM — R05.3 CHRONIC COUGH: Status: ACTIVE | Noted: 2024-10-11

## 2024-10-11 PROCEDURE — 3074F SYST BP LT 130 MM HG: CPT | Performed by: INTERNAL MEDICINE

## 2024-10-11 PROCEDURE — 99214 OFFICE O/P EST MOD 30 MIN: CPT | Performed by: INTERNAL MEDICINE

## 2024-10-11 PROCEDURE — 99213 OFFICE O/P EST LOW 20 MIN: CPT | Performed by: INTERNAL MEDICINE

## 2024-10-11 PROCEDURE — 3078F DIAST BP <80 MM HG: CPT | Performed by: INTERNAL MEDICINE

## 2024-10-11 ASSESSMENT — FIBROSIS 4 INDEX: FIB4 SCORE: 1.59

## 2024-10-16 ENCOUNTER — TELEMEDICINE (OUTPATIENT)
Dept: BEHAVIORAL HEALTH | Facility: CLINIC | Age: 74
End: 2024-10-16
Payer: MEDICARE

## 2024-10-16 DIAGNOSIS — F33.2 MDD (MAJOR DEPRESSIVE DISORDER), RECURRENT SEVERE, WITHOUT PSYCHOSIS (HCC): ICD-10-CM

## 2024-10-16 DIAGNOSIS — F90.0 ATTENTION DEFICIT HYPERACTIVITY DISORDER (ADHD), PREDOMINANTLY INATTENTIVE TYPE: ICD-10-CM

## 2024-10-16 DIAGNOSIS — G47.09 OTHER INSOMNIA: ICD-10-CM

## 2024-10-16 PROCEDURE — 99214 OFFICE O/P EST MOD 30 MIN: CPT | Mod: 95 | Performed by: PSYCHIATRY & NEUROLOGY

## 2024-10-16 PROCEDURE — 96127 BRIEF EMOTIONAL/BEHAV ASSMT: CPT | Mod: 95 | Performed by: PSYCHIATRY & NEUROLOGY

## 2024-10-16 RX ORDER — DEXTROAMPHETAMINE SACCHARATE, AMPHETAMINE ASPARTATE MONOHYDRATE, DEXTROAMPHETAMINE SULFATE AND AMPHETAMINE SULFATE 7.5; 7.5; 7.5; 7.5 MG/1; MG/1; MG/1; MG/1
30 CAPSULE, EXTENDED RELEASE ORAL EVERY MORNING
Qty: 30 CAPSULE | Refills: 0 | Status: SHIPPED | OUTPATIENT
Start: 2024-10-16 | End: 2024-10-21

## 2024-10-16 RX ORDER — TRAZODONE HYDROCHLORIDE 100 MG/1
150 TABLET ORAL NIGHTLY PRN
Qty: 135 TABLET | Refills: 1 | Status: SHIPPED | OUTPATIENT
Start: 2024-10-16

## 2024-10-16 ASSESSMENT — PATIENT HEALTH QUESTIONNAIRE - PHQ9
5. POOR APPETITE OR OVEREATING: 3 - NEARLY EVERY DAY
SUM OF ALL RESPONSES TO PHQ QUESTIONS 1-9: 20
CLINICAL INTERPRETATION OF PHQ2 SCORE: 6

## 2024-10-21 DIAGNOSIS — F90.0 ATTENTION DEFICIT HYPERACTIVITY DISORDER (ADHD), PREDOMINANTLY INATTENTIVE TYPE: ICD-10-CM

## 2024-10-21 RX ORDER — DEXTROAMPHETAMINE SACCHARATE, AMPHETAMINE ASPARTATE MONOHYDRATE, DEXTROAMPHETAMINE SULFATE AND AMPHETAMINE SULFATE 5; 5; 5; 5 MG/1; MG/1; MG/1; MG/1
20 CAPSULE, EXTENDED RELEASE ORAL EVERY MORNING
Qty: 30 CAPSULE | Refills: 0 | Status: SHIPPED | OUTPATIENT
Start: 2024-10-21 | End: 2024-11-20

## 2024-11-26 ENCOUNTER — TELEPHONE (OUTPATIENT)
Dept: NEUROLOGY | Facility: MEDICAL CENTER | Age: 74
End: 2024-11-26
Payer: MEDICARE

## 2024-11-26 DIAGNOSIS — M81.0 AGE-RELATED OSTEOPOROSIS WITHOUT CURRENT PATHOLOGICAL FRACTURE: ICD-10-CM

## 2024-11-26 NOTE — TELEPHONE ENCOUNTER
NEUROLOGY PATIENT PRE-VISIT PLANNING     Patient was NOT contacted to complete PVP.  Note: Patient will not be contacted if there is no indication to call.     Patient Appointment is scheduled as: Established Patient     Is visit type and length scheduled correctly? Yes    [facility] is checked in Patient Demographics? Yes    3.   Is referral attached to visit? Yes    4. Were records received from referring provider? Yes    4. Patient was NOT contacted to have someone accompany them to visit.     5. Is this appointment scheduled as a Hospital Follow-Up?  No    6. Does the patient require any pre procedure or post procedure follow up? No    7. If any orders were placed at last visit or intended to be done for this visit do we have Results/Consult Notes? No  Labs - Labs ordered, completed on 04/27/24 and results are in chart.  Imaging - Imaging was not ordered at last office visit.  Referrals - No referrals were ordered at last office visit.  Note: If patient appointment is for lab or imaging review and patient did not complete the studies, check with provider if OK to reschedule patient until completed.    8. If patient appointment is for Botox - is order pended for provider? N/A    9. Was Plan Assessment from last Neurology Office Visit Reviewed?  Yes

## 2024-12-03 ENCOUNTER — TELEMEDICINE (OUTPATIENT)
Dept: BEHAVIORAL HEALTH | Facility: CLINIC | Age: 74
End: 2024-12-03
Payer: MEDICARE

## 2024-12-03 DIAGNOSIS — G47.09 OTHER INSOMNIA: ICD-10-CM

## 2024-12-03 DIAGNOSIS — F90.0 ADHD (ATTENTION DEFICIT HYPERACTIVITY DISORDER), INATTENTIVE TYPE: ICD-10-CM

## 2024-12-03 DIAGNOSIS — F33.2 MDD (MAJOR DEPRESSIVE DISORDER), RECURRENT SEVERE, WITHOUT PSYCHOSIS (HCC): ICD-10-CM

## 2024-12-03 DIAGNOSIS — F90.0 ATTENTION DEFICIT HYPERACTIVITY DISORDER (ADHD), PREDOMINANTLY INATTENTIVE TYPE: ICD-10-CM

## 2024-12-03 PROCEDURE — 90833 PSYTX W PT W E/M 30 MIN: CPT | Mod: 95 | Performed by: PSYCHIATRY & NEUROLOGY

## 2024-12-03 PROCEDURE — 99214 OFFICE O/P EST MOD 30 MIN: CPT | Mod: 95 | Performed by: PSYCHIATRY & NEUROLOGY

## 2024-12-03 RX ORDER — DEXTROAMPHETAMINE SACCHARATE, AMPHETAMINE ASPARTATE MONOHYDRATE, DEXTROAMPHETAMINE SULFATE AND AMPHETAMINE SULFATE 7.5; 7.5; 7.5; 7.5 MG/1; MG/1; MG/1; MG/1
30 CAPSULE, EXTENDED RELEASE ORAL EVERY MORNING
Qty: 30 CAPSULE | Refills: 0 | Status: SHIPPED | OUTPATIENT
Start: 2024-12-03 | End: 2025-01-02

## 2024-12-03 RX ORDER — DEXTROAMPHETAMINE SACCHARATE, AMPHETAMINE ASPARTATE MONOHYDRATE, DEXTROAMPHETAMINE SULFATE AND AMPHETAMINE SULFATE 7.5; 7.5; 7.5; 7.5 MG/1; MG/1; MG/1; MG/1
30 CAPSULE, EXTENDED RELEASE ORAL EVERY MORNING
Qty: 30 CAPSULE | Refills: 0 | Status: SHIPPED | OUTPATIENT
Start: 2025-01-03 | End: 2025-02-02

## 2024-12-03 RX ORDER — DEXTROAMPHETAMINE SACCHARATE, AMPHETAMINE ASPARTATE MONOHYDRATE, DEXTROAMPHETAMINE SULFATE AND AMPHETAMINE SULFATE 7.5; 7.5; 7.5; 7.5 MG/1; MG/1; MG/1; MG/1
30 CAPSULE, EXTENDED RELEASE ORAL EVERY MORNING
Qty: 30 CAPSULE | Refills: 0 | Status: SHIPPED | OUTPATIENT
Start: 2025-02-03 | End: 2025-03-05

## 2024-12-03 RX ORDER — TRAZODONE HYDROCHLORIDE 100 MG/1
150-200 TABLET ORAL NIGHTLY PRN
Qty: 180 TABLET | Refills: 1 | Status: SHIPPED | OUTPATIENT
Start: 2024-12-03
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
## 2024-12-03 NOTE — TELEPHONE ENCOUNTER
Received request via: Pharmacy    Was the patient seen in the last year in this department? No Has appt scheduled with Lili on Dec. 9th     Does the patient have an active prescription (recently filled or refills available) for medication(s) requested? No    [facility]: [facility]     Does the patient have longterm Plus and need 100-day supply? (This applies to ALL medications) Yes, quantity updated to 100 days

## 2024-12-04 RX ORDER — ALENDRONATE SODIUM 70 MG/1
70 TABLET ORAL
Qty: 12 TABLET | Refills: 0 | Status: SHIPPED | OUTPATIENT
Start: 2024-12-04 | End: 2024-12-09

## 2024-12-09 ENCOUNTER — GYNECOLOGY VISIT (OUTPATIENT)
Dept: OBGYN | Facility: CLINIC | Age: 74
End: 2024-12-09
Payer: MEDICARE

## 2024-12-09 VITALS
SYSTOLIC BLOOD PRESSURE: 128 MMHG | HEIGHT: 62 IN | BODY MASS INDEX: 24.84 KG/M2 | DIASTOLIC BLOOD PRESSURE: 77 MMHG | WEIGHT: 135 LBS

## 2024-12-09 DIAGNOSIS — Z12.39 ENCOUNTER FOR BREAST CANCER SCREENING USING NON-MAMMOGRAM MODALITY: ICD-10-CM

## 2024-12-09 DIAGNOSIS — Z01.419 WELL WOMAN EXAM: ICD-10-CM

## 2024-12-09 DIAGNOSIS — M81.0 AGE-RELATED OSTEOPOROSIS WITHOUT CURRENT PATHOLOGICAL FRACTURE: ICD-10-CM

## 2024-12-09 PROCEDURE — G0101 CA SCREEN;PELVIC/BREAST EXAM: HCPCS

## 2024-12-09 RX ORDER — ALENDRONATE SODIUM 70 MG/1
70 TABLET ORAL
Qty: 12 TABLET | Refills: 0 | Status: CANCELLED | OUTPATIENT
Start: 2024-12-09

## 2024-12-09 RX ORDER — ALENDRONATE SODIUM 70 MG/1
70 TABLET ORAL
Qty: 12 TABLET | Refills: 4 | Status: SHIPPED | OUTPATIENT
Start: 2024-12-09

## 2024-12-09 ASSESSMENT — FIBROSIS 4 INDEX: FIB4 SCORE: 1.59

## 2024-12-09 NOTE — PROGRESS NOTES
Patient here for annual well woman exam and for medication refill.  Patient is postmenopausal.   Denies abnormal discharge, denies bleeding  Patient is not sexually active and hasn't for years.  Last Pap/Results: She is not sure when her last pap was - denies abnormal history  Last Mammogram: 10/2023 - Ben[facility]  Phone: [phone]  Pharmacy: [facility] [address] [place]  Bone Density Scan done 10/2023   Reason for Admission:  COPD exacerbation ([facility] [address] [J44.1]                 RUR Score:    19            Plan for utilizing home health:    yes                      Likelihood of Readmission:   Moderate                         Do you (patient/family) have any concerns for transition/discharge?  no    Transition of Care Plan:       Initial assessment completed with patient. Cognitive status of patient: oriented to time, place, person and situation. Face sheet information confirmed:  yes. The patient designates son to participate in her discharge plan and to receive any needed information. This patient lives in a single family home with patient and other:  Alone but multiple family  Help as needed. Patient is able to navigate steps as needed. Prior to hospitalization, patient was considered to be independent with ADLs/IADLS : yes . Patient has a current ACP document on file: no  The patient and son will be available to transport patient home upon discharge. The patient already has Rollator, Shower chair, and oxygen  3 liters. [employer]? medical equipment available in the home. Patient is currently active with home health. If active, agency name is [facility]. Patient has not stayed in a skilled nursing facility or rehab. Was  stay within last 60 days : no. This patient is on dialysis :no        List of available Home Health agencies were provided and reviewed with the patient prior to discharge. Freedom of choice signed: yes, for [facility]. Currently, the discharge plan is Home with [address] [place] [address] [facility]. The patient states that she can obtain her medications from the pharmacy, and take her medications as directed. Patient's current insurance is Medicare and       Care Management Interventions  PCP Verified by CM:  Yes  Mode of Transport at Discharge: Self(son)  Transition of Care Consult (CM Consult): [address]: Yes  Current Support Network: Lives Alone, Own Home  Confirm Follow Up Transport: Family  The Plan for Transition of Care is Related to the Following Treatment Goals : home health  The Patient and/or Patient Representative was Provided with a Choice of Provider and Agrees with the Discharge Plan?: Yes  Freedom of Choice List was Provided with Basic Dialogue that Supports the Patient's Individualized Plan of Care/Goals, Treatment Preferences and Shares the Quality Data Associated with the Providers?: Yes  Discharge Location  Discharge Placement: Home with home health        Hardeep Cueto RN BSN  Outcomes Manager    Pager # [phone]

## 2024-12-09 NOTE — PROGRESS NOTES
ANNUAL GYNECOLOGY VISIT    Chief Complaint  Annual Exam    Subjective  Kristen Carrera is a 74 y.o. female  No LMP recorded. Patient is postmenopausal. menopausal who presents today for Annual Exam. She is feeling a lump in the right upper quadrant of her right breast. She had breast implants done about 40 years ago and it feels like there scar tissue in there and its getting hard more recently. Her last mammogram was 10/2023. Pt had a fall 2 weeks ago and fell down 10 stairs. Has no pain as of now from her fall.   Preventive Care   Immunization History   Administered Date(s) Administered    Pneumococcal Conjugate Vaccine (Prevnar/PCV-13) 10/06/2015    Pneumococcal polysaccharide vaccine (PPSV-23) 2018    Tdap Vaccine 2018       Last Mammogram: 10/2023  Last Colonoscopy: >10 years ago  Last DEXA: 10/2023 Osteopenia   Taking Calcium/Vit D Supp: combo supplement  Personal Hx of fracture as an adult: Yes right ankle 2 1/2  years ago   Hx of fracture in first-degree relative: No   race: Yes  Dementia: No  Poor health/frailty: No  Recurrent falls: Yes 2 weeks ago fell down 10 stairs   Tobacco use: No  Low body weight (<127 lbs): No  Early menopause (age <45) or BSO: No  Alcoholism:No  Inadequate physical activity: Yes      Gynecology History  Current Sexual Activity: no  History of sexually transmitted diseases? yes - gonorrhea 36 years ago   Abnormal discharge? no  Menopause: yes - age 52  Postmenopausal bleeding: no    Menstrual History  No LMP recorded. Patient is postmenopausal. At age 52      Contraception  Past: OCP       Cancer Risk Assessement:  Family history of:   - Breast cancer: sister   - Ovarian cancer: no   - Uterine cancer: mother   - Colon cancer: no    Obstetric History  OB History    Para Term  AB Living   2 1 1   1     SAB IAB Ectopic Molar Multiple Live Births     1              # Outcome Date GA Lbr [place]/2nd Weight Sex Type Anes PTL Lv   2 Term 71 40w0d    F Vag-Spont      1 IAB                Past Medical History  Past Medical History:   Diagnosis Date    ADD (attention deficit disorder)     Anxiety     Arthritis     CTS (carpal tunnel syndrome)     B/L    Depression     Fatigue 11/08/2011    H/O fracture of ankle 09/09/2022    Added automatically from request for surgery [id number]    History of abdominoplasty 03/15/2012    History of hypothyroidism     Hyperlipidemia     Hypothyroidism     Midline low back pain without sciatica 08/04/2015    Moderate episode of recurrent major depressive disorder (HCC) 10/06/2015    Osteoporosis 08/19/2014    PATENT FORAMEN OVALE LEFT TO RIGHT SHUNT 12/08/2010       Past Surgical History  Past Surgical History:   Procedure Laterality Date    PB OPEN TX TRIMALLEOLAR ANKLE FX W/O FIX PST LIP Right 9/12/2022    Procedure: RIGHT TRIMALLEOLAR OPEN REDUCTION INTERNAL FIXATION, RIGHT SYNDESMOSIS OPEN REDUCTION INTERNAL FIXATION;  Surgeon: Trey Conway M.D.;  Location: [facility];  Service: Orthopedics    ABDOMINOPLASTY  1977    APPENDECTOMY      OTHER      TUMMY TUCK, BREAST REDUCTION, LIPOSUCTION OF HIPS AND THIGHS.    PB MAMMARY DUCTOGRAM, SINGLE      CO ANESTH,LOLA HYST FOL NEURAXIAL ANAL/ANES      CO BREAST REDUCTION      TONSILLECTOMY      TUBAL COAGULATION LAPAROSCOPIC BILATERAL         Social History  Social History     Tobacco Use    Smoking status: Never    Smokeless tobacco: Never   Vaping Use    Vaping status: Never Used   Substance Use Topics    Alcohol use: Yes     Comment: 1-2 times a month    Drug use: Yes     Types: Marijuana     Comment: once a month        Family History  Family History   Problem Relation Age of Onset    Hypertension Mother     Cancer Mother         UTERUS    Heart Disease Mother         Arteriosclerotic Cardiovascular Disease    Other Mother         Coronary Artery Bypass Graft    Psychiatric Illness Mother     Arthritis Mother     Cancer Father         PANCREATIC    Cancer  "Sister 51        BREAST    Breast Cancer Sister     Stroke Neg Hx     Diabetes Neg Hx     Lung Disease Neg Hx        Home Medications  Current Outpatient Medications   Medication Sig    ibuprofen (MOTRIN) 600 MG Tab TAKE 1 TABLET BY MOUTH 4 TIMES DAILY FOR 14 DAYS AS NEEDED    methocarbamol (ROBAXIN) 500 MG Tab TAKE 1 TABLET BY MOUTH 4 TIMES DAILY AS NEEDED FOR MUSCLE SPASM FOR 7 DAYS    alendronate (FOSAMAX) 70 MG Tab Take 1 Tablet by mouth every 7 days.    amphetamine-dextroamphetamine ER (ADDERALL XR) 30 MG XR capsule Take 1 Capsule by mouth every morning for 30 days.    [START ON 1/3/2025] amphetamine-dextroamphetamine ER (ADDERALL XR) 30 MG XR capsule Take 1 Capsule by mouth every morning for 30 days.    [START ON 2/3/2025] amphetamine-dextroamphetamine ER (ADDERALL XR) 30 MG XR capsule Take 1 Capsule by mouth every morning for 30 days.    traZODone (DESYREL) 100 MG Tab Take 1.5-2 Tablets by mouth at bedtime as needed for Sleep.    lisinopril (PRINIVIL) 20 MG Tab Take 1 Tablet by mouth every day.    rosuvastatin (CRESTOR) 20 MG Tab TAKE 1 TABLET BY MOUTH EVERY EVENING    MAGNESIUM PO     tolterodine ER (DETROL LA) 4 MG CAPSULE SR 24 HR     Calcium Carbonate-Vitamin D (CALCIUM-D PO) Take 2 Tablets by mouth every day.    aspirin 81 MG EC tablet Take 1 Tablet by mouth every day. (Patient not taking: Reported on 12/9/2024)       Allergies/Reactions  Allergies   Allergen Reactions    Hydrocodone      Nightmares and \"makes my skin crawl\"    Hydrocodone-Acetaminophen Unspecified       ROS  Review of Systems:  Gen: no fevers or chills, no significant weight loss or gain  Respiratory:  no cough or dyspnea  Cardiac:  no chest pain, no palpitations, no syncope  GI:  no heartburn, no abdominal pain, no nausea or vomiting  Psych: no depression or anxiety    Objective  /77 (BP Location: Right arm, Patient Position: Sitting, BP Cuff Size: Adult)   Ht 5' 2\"   Wt 135 lb   BMI 24.69 kg/m²     Constitutional: The patient " is well developed and well nourished.  Psychiatric: Patient is oriented to time place and person.   Skin: No rash observed.  Neck: Appears symmetric. Thyroid normal size  Respiratory: normal effort  Breast: Inspection reveals no asymetry or nipple discharge, no skin thickening, dimpling or erythema.  Palpation demonstrates no masses.  Abdomen: Soft, non-tender.  Pelvic Exam:      Vulva: external female genitalia are normal in appearance. No lesions     Urethra - no lesions, no erythema     Vagina: moist, pink, normal ruggae     Cervix: pink, smooth, no lesions, no CMT     Uterus - non-tender, normal size, shape, contour, mobile, anteverted     Ovaries: non-tender, no appreciable masses   Pap Smear performed: Yes   Chaperone Present: Blanka Bentley MA  Extremities: Legs are symmetric and without tenderness. There is no edema present.      Assessment & Plan  Kristen Carrera is a 74 y.o. female who presents today for Annual Gyn Exam.      1. Health Maintenance   - Cervical cancer screening:       Pap: Not indicated  - STI Screen (HIV, Syphilis, Chlamydia / Gonorrhea): declined  - MAMMOGRAM: Indicated; after counseling, the patient declines screening US ordered today  - COLONOSCOPY: Indicated; after counseling, the patient declines  - DEXA: Up-to-date; next due 10/2025    - IMMUNIZATIONS: all desired utd  ; Recommended Flu and vaccine each season and COVID boosters when indicated.  - TOBACCO:  denies  - DIABETES SCREEN:  follows with primary care  - CHOLESTEROL SCREEN:  follows with primary care  - COUNSELING: breast self exam, mammography screening, menopause, and adequate intake of calcium and vitamin D      2. Age-related osteoporosis without current pathological fracture    - alendronate (FOSAMAX) 70 MG Tab; Take 1 Tablet by mouth every 7 days.  Dispense: 12 Tablet; Refill: 4    3. Encounter for breast cancer screening using non-mammogram modality    - US-SCREENING WHOLE BREAST BILATERAL (3D SCREENING);  Future        Return: Annually or PRN    ELMO Nagy  [facility]   12/9/2024

## 2024-12-16 ENCOUNTER — HOSPITAL ENCOUNTER (OUTPATIENT)
Dept: BEHAVIORAL HEALTH | Facility: MEDICAL CENTER | Age: 74
End: 2024-12-16
Attending: PSYCHIATRY & NEUROLOGY
Payer: MEDICARE

## 2024-12-16 VITALS — WEIGHT: 135 LBS | HEIGHT: 62 IN | BODY MASS INDEX: 24.84 KG/M2

## 2024-12-16 DIAGNOSIS — F33.2 MDD (MAJOR DEPRESSIVE DISORDER), RECURRENT SEVERE, WITHOUT PSYCHOSIS (HCC): ICD-10-CM

## 2024-12-16 PROCEDURE — 96127 BRIEF EMOTIONAL/BEHAV ASSMT: CPT | Performed by: PSYCHIATRY & NEUROLOGY

## 2024-12-16 PROCEDURE — 99214 OFFICE O/P EST MOD 30 MIN: CPT | Performed by: PSYCHIATRY & NEUROLOGY

## 2024-12-16 ASSESSMENT — ANXIETY QUESTIONNAIRES
7. FEELING AFRAID AS IF SOMETHING AWFUL MIGHT HAPPEN: NOT AT ALL
1. FEELING NERVOUS, ANXIOUS, OR ON EDGE: MORE THAN HALF THE DAYS
4. TROUBLE RELAXING: MORE THAN HALF THE DAYS
3. WORRYING TOO MUCH ABOUT DIFFERENT THINGS: MORE THAN HALF THE DAYS
2. NOT BEING ABLE TO STOP OR CONTROL WORRYING: NEARLY EVERY DAY
IF YOU CHECKED OFF ANY PROBLEMS ON THIS QUESTIONNAIRE, HOW DIFFICULT HAVE THESE PROBLEMS MADE IT FOR YOU TO DO YOUR WORK, TAKE CARE OF THINGS AT HOME, OR GET ALONG WITH OTHER PEOPLE: VERY DIFFICULT
6. BECOMING EASILY ANNOYED OR IRRITABLE: NOT AT ALL
5. BEING SO RESTLESS THAT IT IS HARD TO SIT STILL: NOT AT ALL
GAD7 TOTAL SCORE: 9

## 2024-12-16 ASSESSMENT — PATIENT HEALTH QUESTIONNAIRE - PHQ9
SUM OF ALL RESPONSES TO PHQ QUESTIONS 1-9: 17
5. POOR APPETITE OR OVEREATING: 3 - NEARLY EVERY DAY
CLINICAL INTERPRETATION OF PHQ2 SCORE: 3

## 2024-12-16 ASSESSMENT — FIBROSIS 4 INDEX: FIB4 SCORE: 1.59

## 2024-12-16 NOTE — PROGRESS NOTES
PSYCHIATRY TRANSCRANIAL MAGNETIC STIMULATION INTAKE NOTE    Chief Complaint   Patient presents with    Psychiatric Evaluation     For TMS     History Of Present Illness:  Kristen Carrera is a 74 y.o. female with major depressive disorder, ADHD, hypertension, sleep apnea on BiPAP, low back pain, dyslipidemia, osteoporosis, osteoarthritis referred by Dr. Carrion for Transcranial Magnetic Stimulation treatment evaluation.  She was struggled with depression for the last 15 years or so.  I saw her about 6 months ago for [place] evaluation and was referred to [facility] ketamine clinic but she was unable to get ketamine treatments because of her insurance.  She is interested in transcranial magnetic stimulation as she mentions that previously she has tried several antidepressant medications but none of them have ever really worked for her.  She has been struggling with worsening depression and a lot of her depression is attributed to relationship with her daughter.  She mentions that she sees her daughter several times a week and she tends to put her down which she does not like.  She does enjoy spending time with her young great grandson but other than that she does not find enjoyment in her life.  She used to enjoy doing arts and crafts but she has not been able to start and finish her task.  She does struggle with her appetite, sleep and energy.  She has been prescribed Adderall XR for hypersomnolence and Trazodone for insomnia.  She denies struggles with anxiety or hallucinations.  She denies having thoughts of wanting to hurt herself.    Social History:   She is single,  and  x 1, lives alone in [place], daughter lives here, grand daughter struggles with drug addiction and lives here as well.     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 Effexor, Cymbalta, Pristiq x years (ineffective), Remeron, Abilify, Ambien     Medications:  Current Outpatient Medications   Medication Sig Dispense Refill    ibuprofen (MOTRIN) 600 MG Tab TAKE 1 TABLET BY MOUTH 4 TIMES DAILY FOR 14 DAYS AS NEEDED      methocarbamol (ROBAXIN) 500 MG Tab TAKE 1 TABLET BY MOUTH 4 TIMES DAILY AS NEEDED FOR MUSCLE SPASM FOR 7 DAYS      alendronate (FOSAMAX) 70 MG Tab Take 1 Tablet by mouth every 7 days. 12 Tablet 4    amphetamine-dextroamphetamine ER (ADDERALL XR) 30 MG XR capsule Take 1 Capsule by mouth every morning for 30 days. 30 Capsule 0    traZODone (DESYREL) 100 MG Tab Take 1.5-2 Tablets by mouth at bedtime as needed for Sleep. 180 Tablet 1    lisinopril (PRINIVIL) 20 MG Tab Take 1 Tablet by mouth every day. 100 Tablet 3    rosuvastatin (CRESTOR) 20 MG Tab TAKE 1 TABLET BY MOUTH EVERY EVENING 100 Tablet 3    MAGNESIUM PO       tolterodine ER (DETROL LA) 4 MG CAPSULE SR 24 HR       Calcium Carbonate-Vitamin D (CALCIUM-D PO) Take 2 Tablets by mouth every day.      [START ON 1/3/2025] amphetamine-dextroamphetamine ER (ADDERALL XR) 30 MG XR capsule Take 1 Capsule by mouth every morning for 30 days. 30 Capsule 0    [START ON 2/3/2025] amphetamine-dextroamphetamine ER (ADDERALL XR) 30 MG XR capsule Take 1 Capsule by mouth every morning for 30 days. 30 Capsule 0    aspirin 81 MG EC tablet Take 1 Tablet by mouth every day. (Patient not taking: Reported on 12/16/2024) 100 Tablet 3     No current facility-administered medications for this encounter.     Review Of Systems:    Constitutional - Positive for fatigue  Psychiatric - Positive for depression, insomnia    Physical Examination:  Vital signs: Ht 1.575 m (5' 2\")   Wt 61.2 kg (135 lb)   BMI 24.69 kg/m²     Musculoskeletal: Normal gait. No abnormal movements.     Mental Status Evaluation:   General: Elderly female, dressed in casual attire, good grooming and hygiene, in no apparent distress, calm and " "cooperative, good eye contact, no psychomotor agitation or retardation  Orientation: Alert and oriented to person, place and time  Recent and remote memory: Grossly intact  Attention span and concentration: Grossly intact  Speech: Spontaneous, normal rate, rhythm and tone  Thought Process: Linear, logical and goal directed  Thought Content: Denies suicidal or homicidal ideations, intent or plan  Perception: Denies auditory or visual hallucinations. No delusions noted  Associations: Intact  Language: Appropriate  Fund of knowledge and vocabulary: Grossly adequate  Mood: \"hanging in there\"  Affect: Dysphoric, mood congruent  Insight: Good  Judgment: Good    Depression screenin/24/2024     1:30 PM 10/16/2024     2:30 PM 2024    11:44 AM   Depression Screen (PHQ-2/PHQ-9)   PHQ-2 Total Score 6 6 3   PHQ-9 Total Score 21 20 17     Interpretation of PHQ-9 Total Score   Score Severity   1-4 No Depression   5-9 Mild Depression   10-14 Moderate Depression   15-19 Moderately Severe Depression   20-27 Severe Depression    Anxiety screenin/29/2024     3:52 PM 2024     1:42 PM 2024    12:10 PM   PAT 7   PAT-7 Total Score 6 8 9     Interpretation of PAT 7 Total Score   Score Severity:  0-4 No Anxiety   5-9 Mild Anxiety  10-14 Moderate Anxiety  15-21 Severe Anxiety    Medical Records/Labs/Diagnostic Tests Reviewed:  [facility] records - appropriate refills      Impression:  1.  Major depressive disorder, recurrent, severe, without psychotic symptoms - worsening     Plan:  Referral placed for Transcranial Magnetic Stimulation.  She is appropriate for TMS given inadequate response to prior trials to antidepressant medications and severity of depression, PHQ score is 17 today.  TMS treatment schedule will consist of mapping, re-mapping (if needed) and 36 treatments (7 weeks + 1 day).  Discussed mechanism of action, efficacy in depression treatment, side effects including application site pain, headache " and rarely seizure.  She does not have any contraindications to TMS including presence of conductive, ferromagnetic, or other magnetic-sensitive metals implanted in their head within 30 cm of the treatment coil like cochlear implants, implanted electrodes/stimulators, aneurysm clips or coils, stents, bullet fragments, jewelry and hair barrettes.     Return to clinic for TMS mapping     The proposed treatment plan was discussed with the patient who was provided the opportunity to ask questions and make suggestions regarding alternative treatment. Patient verbalized understanding and expressed agreement with the plan.     Sara Cantor M.D.  12/16/24    This note was created using voice recognition software (Dragon). The accuracy of the dictation is limited by the abilities of the software. I have reviewed the note prior to signing, however some errors in grammar and context are still possible. If you have any questions related to this note please do not hesitate to contact our office.

## 2025-01-15 ENCOUNTER — HOSPITAL ENCOUNTER (OUTPATIENT)
Dept: BEHAVIORAL HEALTH | Facility: MEDICAL CENTER | Age: 75
End: 2025-01-15
Attending: PSYCHIATRY & NEUROLOGY
Payer: MEDICARE

## 2025-01-15 PROCEDURE — 99999 PR NO CHARGE: CPT | Performed by: PSYCHIATRY & NEUROLOGY

## 2025-01-20 ENCOUNTER — HOSPITAL ENCOUNTER (OUTPATIENT)
Dept: BEHAVIORAL HEALTH | Facility: MEDICAL CENTER | Age: 75
End: 2025-01-20
Attending: PSYCHIATRY & NEUROLOGY
Payer: MEDICARE

## 2025-01-20 DIAGNOSIS — F33.2 MDD (MAJOR DEPRESSIVE DISORDER), RECURRENT SEVERE, WITHOUT PSYCHOSIS (HCC): ICD-10-CM

## 2025-01-20 PROCEDURE — 90867 TCRANIAL MAGN STIM TX PLAN: CPT | Performed by: PSYCHIATRY & NEUROLOGY

## 2025-01-20 NOTE — PROGRESS NOTES
TMS mapping done today.    Bhupindermaryanne Florian is following my treatment plan in today's treatment. I reviewed her note and agree with today's treatment planning.    Sara Cantor MD

## 2025-01-21 ENCOUNTER — HOSPITAL ENCOUNTER (OUTPATIENT)
Dept: BEHAVIORAL HEALTH | Facility: MEDICAL CENTER | Age: 75
End: 2025-01-21
Attending: PSYCHIATRY & NEUROLOGY
Payer: MEDICARE

## 2025-01-21 PROCEDURE — 90868 TCRANIAL MAGN STIM TX DELI: CPT | Performed by: PSYCHIATRY & NEUROLOGY

## 2025-01-21 PROCEDURE — 99999 PR NO CHARGE: CPT | Performed by: PSYCHIATRY & NEUROLOGY

## 2025-01-21 NOTE — PROGRESS NOTES
Bhupinder Florian is following my treatment plan in today's treatment. I reviewed her note and agree with today's treatment planning.    Sara Cantor MD

## 2025-01-22 ENCOUNTER — HOSPITAL ENCOUNTER (OUTPATIENT)
Dept: BEHAVIORAL HEALTH | Facility: MEDICAL CENTER | Age: 75
End: 2025-01-22
Attending: PSYCHIATRY & NEUROLOGY
Payer: MEDICARE

## 2025-01-22 PROCEDURE — 99999 PR NO CHARGE: CPT | Performed by: PSYCHIATRY & NEUROLOGY

## 2025-01-22 PROCEDURE — 90868 TCRANIAL MAGN STIM TX DELI: CPT | Performed by: PSYCHIATRY & NEUROLOGY

## 2025-01-23 ENCOUNTER — HOSPITAL ENCOUNTER (OUTPATIENT)
Dept: BEHAVIORAL HEALTH | Facility: MEDICAL CENTER | Age: 75
End: 2025-01-23
Attending: PSYCHIATRY & NEUROLOGY
Payer: MEDICARE

## 2025-01-23 PROCEDURE — 90868 TCRANIAL MAGN STIM TX DELI: CPT | Performed by: PSYCHIATRY & NEUROLOGY

## 2025-01-23 PROCEDURE — 99999 PR NO CHARGE: CPT | Performed by: PSYCHIATRY & NEUROLOGY

## 2025-01-24 ENCOUNTER — HOSPITAL ENCOUNTER (OUTPATIENT)
Dept: BEHAVIORAL HEALTH | Facility: MEDICAL CENTER | Age: 75
End: 2025-01-24
Attending: PSYCHIATRY & NEUROLOGY
Payer: MEDICARE

## 2025-01-24 PROCEDURE — 99999 PR NO CHARGE: CPT | Performed by: PSYCHIATRY & NEUROLOGY

## 2025-01-24 PROCEDURE — 90868 TCRANIAL MAGN STIM TX DELI: CPT | Performed by: PSYCHIATRY & NEUROLOGY

## 2025-01-24 NOTE — PROGRESS NOTES
Bhupinder Florian is following my treatment plan in today's treatment. I reviewed her notes and agree with the today's treatment planning.

## 2025-01-27 ENCOUNTER — HOSPITAL ENCOUNTER (OUTPATIENT)
Dept: BEHAVIORAL HEALTH | Facility: MEDICAL CENTER | Age: 75
End: 2025-01-27
Attending: PSYCHIATRY & NEUROLOGY
Payer: MEDICARE

## 2025-01-27 PROCEDURE — 90868 TCRANIAL MAGN STIM TX DELI: CPT | Performed by: PSYCHIATRY & NEUROLOGY

## 2025-01-27 PROCEDURE — 99999 PR NO CHARGE: CPT | Performed by: PSYCHIATRY & NEUROLOGY

## 2025-01-28 ENCOUNTER — HOSPITAL ENCOUNTER (OUTPATIENT)
Dept: BEHAVIORAL HEALTH | Facility: MEDICAL CENTER | Age: 75
End: 2025-01-28
Attending: PSYCHIATRY & NEUROLOGY
Payer: MEDICARE

## 2025-01-29 ENCOUNTER — HOSPITAL ENCOUNTER (OUTPATIENT)
Dept: BEHAVIORAL HEALTH | Facility: MEDICAL CENTER | Age: 75
End: 2025-01-29
Attending: PSYCHIATRY & NEUROLOGY
Payer: MEDICARE

## 2025-01-29 PROCEDURE — 90868 TCRANIAL MAGN STIM TX DELI: CPT | Performed by: PSYCHIATRY & NEUROLOGY

## 2025-01-30 ENCOUNTER — HOSPITAL ENCOUNTER (OUTPATIENT)
Dept: BEHAVIORAL HEALTH | Facility: MEDICAL CENTER | Age: 75
End: 2025-01-30
Attending: PSYCHIATRY & NEUROLOGY
Payer: MEDICARE

## 2025-01-30 PROCEDURE — 90868 TCRANIAL MAGN STIM TX DELI: CPT | Performed by: PSYCHIATRY & NEUROLOGY

## 2025-01-31 ENCOUNTER — HOSPITAL ENCOUNTER (OUTPATIENT)
Dept: BEHAVIORAL HEALTH | Facility: MEDICAL CENTER | Age: 75
End: 2025-01-31
Attending: PSYCHIATRY & NEUROLOGY
Payer: MEDICARE

## 2025-01-31 PROCEDURE — 90868 TCRANIAL MAGN STIM TX DELI: CPT | Performed by: PSYCHIATRY & NEUROLOGY

## 2025-02-03 ENCOUNTER — HOSPITAL ENCOUNTER (OUTPATIENT)
Dept: BEHAVIORAL HEALTH | Facility: MEDICAL CENTER | Age: 75
End: 2025-02-03
Attending: PSYCHIATRY & NEUROLOGY
Payer: MEDICARE

## 2025-02-03 PROCEDURE — 90868 TCRANIAL MAGN STIM TX DELI: CPT | Performed by: PSYCHIATRY & NEUROLOGY

## 2025-02-03 PROCEDURE — 99999 PR NO CHARGE: CPT | Performed by: PSYCHIATRY & NEUROLOGY

## 2025-02-03 NOTE — PROGRESS NOTES
Bhupinder Florian is following my treatment plan in today's treatment. I reviewed her note and agree with today's treatment planning.    Sara Cnator MD

## 2025-02-04 ENCOUNTER — HOSPITAL ENCOUNTER (OUTPATIENT)
Dept: BEHAVIORAL HEALTH | Facility: MEDICAL CENTER | Age: 75
End: 2025-02-04
Attending: PSYCHIATRY & NEUROLOGY
Payer: MEDICARE

## 2025-02-04 ENCOUNTER — APPOINTMENT (OUTPATIENT)
Dept: RADIOLOGY | Facility: MEDICAL CENTER | Age: 75
End: 2025-02-04
Payer: MEDICARE

## 2025-02-04 PROCEDURE — 90868 TCRANIAL MAGN STIM TX DELI: CPT | Performed by: PSYCHIATRY & NEUROLOGY

## 2025-02-04 PROCEDURE — 99999 PR NO CHARGE: CPT | Performed by: PSYCHIATRY & NEUROLOGY

## 2025-02-05 ENCOUNTER — HOSPITAL ENCOUNTER (OUTPATIENT)
Dept: BEHAVIORAL HEALTH | Facility: MEDICAL CENTER | Age: 75
End: 2025-02-05
Attending: PSYCHIATRY & NEUROLOGY
Payer: MEDICARE

## 2025-02-05 PROCEDURE — 90868 TCRANIAL MAGN STIM TX DELI: CPT | Performed by: PSYCHIATRY & NEUROLOGY

## 2025-02-05 PROCEDURE — 99999 PR NO CHARGE: CPT | Performed by: PSYCHIATRY & NEUROLOGY

## 2025-02-06 ENCOUNTER — OFFICE VISIT (OUTPATIENT)
Dept: BEHAVIORAL HEALTH | Facility: CLINIC | Age: 75
End: 2025-02-06
Payer: MEDICARE

## 2025-02-06 ENCOUNTER — HOSPITAL ENCOUNTER (OUTPATIENT)
Dept: BEHAVIORAL HEALTH | Facility: MEDICAL CENTER | Age: 75
End: 2025-02-06
Attending: PSYCHIATRY & NEUROLOGY
Payer: MEDICARE

## 2025-02-06 DIAGNOSIS — F33.1 MODERATE EPISODE OF RECURRENT MAJOR DEPRESSIVE DISORDER (HCC): ICD-10-CM

## 2025-02-06 DIAGNOSIS — R41.9 COGNITIVE COMPLAINTS WITH NORMAL NEUROPSYCHOLOGICAL EXAM: ICD-10-CM

## 2025-02-06 DIAGNOSIS — F90.0 ATTENTION DEFICIT HYPERACTIVITY DISORDER (ADHD), PREDOMINANTLY INATTENTIVE TYPE: ICD-10-CM

## 2025-02-06 PROCEDURE — 96138 PSYCL/NRPSYC TECH 1ST: CPT | Performed by: CLINICAL NEUROPSYCHOLOGIST

## 2025-02-06 PROCEDURE — 90868 TCRANIAL MAGN STIM TX DELI: CPT | Performed by: PSYCHIATRY & NEUROLOGY

## 2025-02-06 PROCEDURE — 96139 PSYCL/NRPSYC TST TECH EA: CPT | Performed by: CLINICAL NEUROPSYCHOLOGIST

## 2025-02-06 PROCEDURE — 99999 PR NO CHARGE: CPT | Performed by: PSYCHIATRY & NEUROLOGY

## 2025-02-06 PROCEDURE — 96116 NUBHVL XM PHYS/QHP 1ST HR: CPT | Performed by: CLINICAL NEUROPSYCHOLOGIST

## 2025-02-06 PROCEDURE — 96121 NUBHVL XM PHY/QHP EA ADDL HR: CPT | Performed by: CLINICAL NEUROPSYCHOLOGIST

## 2025-02-06 NOTE — PROGRESS NOTES
Neurobehavioral Status Exam and Neuropsychological Testing    Patient name: Kristen Carrera  Referral source: ***   MRN: [id number]  Evaluation date: ***   YOB: 1950  Neuropsychologist: Patti Sims, Ph.D.         This evaluation was conducted for clinical treatment planning and may not be valid for other purposes. Potential risks and benefits, limits of confidentiality, and test procedures were discussed. Following this discussion, the patient consented to complete the evaluation. Information for this section was obtained from the medical record, neuropsychological testing, and a clinical interview with the patient and her *** conducted on ***. Information included in this section is intended as a reference and should not be interpreted in the absence of the complete neuropsychological report. Please refer to the neuropsychological report for additional information including test results, impressions, and recommendations.     Background and Referral Information: The patient is a -year-old, /single//, right/left-handed, , , white, male/female, retired/employed with  years of education who has experienced . Her/His neurologist,  , referred the patient for a neuropsychological evaluation to characterize his/her cognitive concerns, aid in differential diagnosis, and treatment planning.    Presenting Concerns: The patient and his wife reported he has experienced gradually progressive cognitive decline for the past 4 years. Specifically, he has difficulties with short term memory, processing speed, word finding, navigation, and aspects of attention and executive function. He remains independent with basic and instrumental activities of daily living. He reported mild symptoms of depression and anxiety during the clinical interview and on self report measures.     Patient Active Problem List   Diagnosis    ADHD (attention deficit hyperactivity disorder), inattentive  type    Hyperlipidemia    Herpes, genital    PATENT FORAMEN OVALE LEFT TO RIGHT SHUNT txed w/ asa- has seen cardio dr salcedo    Hypersomnolence    Chronic pain of left ankle    Osteoporosis    Midline low back pain without sciatica    CMC arthritis    Insomnia    Primary hypertension    Urinary incontinence    Severe MAX (obstructive sleep apnea)    Vision loss, bilateral    MDD (major depressive disorder), recurrent severe, without psychosis (HCC)    History of methamphetamine abuse (HCC)    Complaints of memory disturbance    History of alcohol abuse    Risk for falls    Mild cognitive impairment with memory loss    Chronic pain of both shoulders    Bilateral hip pain    Subacute cough     Past Medical History:   Diagnosis Date    ADD (attention deficit disorder)     Anxiety     Arthritis     CTS (carpal tunnel syndrome)     B/L    Depression     Fatigue 11/08/2011    H/O fracture of ankle 09/09/2022    Added automatically from request for surgery [id number]    History of abdominoplasty 03/15/2012    History of hypothyroidism     Hyperlipidemia     Hypothyroidism     Midline low back pain without sciatica 08/04/2015    Moderate episode of recurrent major depressive disorder (HCC) 10/06/2015    Osteoporosis 08/19/2014    PATENT FORAMEN OVALE LEFT TO RIGHT SHUNT 12/08/2010      Past Surgical History:   Procedure Laterality Date    PB OPEN TX TRIMALLEOLAR ANKLE FX W/O FIX PST LIP Right 9/12/2022    Procedure: RIGHT TRIMALLEOLAR OPEN REDUCTION INTERNAL FIXATION, RIGHT SYNDESMOSIS OPEN REDUCTION INTERNAL FIXATION;  Surgeon: Trey Conway M.D.;  Location: [facility];  Service: Orthopedics    ABDOMINOPLASTY  1977    APPENDECTOMY      OTHER      TUMMY TUCK, BREAST REDUCTION, LIPOSUCTION OF HIPS AND THIGHS.    PB MAMMARY DUCTOGRAM, SINGLE      NC ANESTH,LOLA HYST FOL NEURAXIAL ANAL/ANES      NC BREAST REDUCTION      TONSILLECTOMY      TUBAL COAGULATION LAPAROSCOPIC BILATERAL        Family History    Problem Relation Age of Onset    Hypertension Mother     Cancer Mother         UTERUS    Heart Disease Mother         Arteriosclerotic Cardiovascular Disease    Other Mother         Coronary Artery Bypass Graft    Psychiatric Illness Mother     Arthritis Mother     Cancer Father         PANCREATIC    Cancer Sister 51        BREAST    Breast Cancer Sister     Drug abuse Granddaughter         heroin    Stroke Neg Hx     Diabetes Neg Hx     Lung Disease Neg Hx         Current Outpatient Medications:     ibuprofen (MOTRIN) 600 MG Tab, TAKE 1 TABLET BY MOUTH 4 TIMES DAILY FOR 14 DAYS AS NEEDED, Disp: , Rfl:     methocarbamol (ROBAXIN) 500 MG Tab, TAKE 1 TABLET BY MOUTH 4 TIMES DAILY AS NEEDED FOR MUSCLE SPASM FOR 7 DAYS, Disp: , Rfl:     alendronate (FOSAMAX) 70 MG Tab, Take 1 Tablet by mouth every 7 days., Disp: 12 Tablet, Rfl: 4    amphetamine-dextroamphetamine ER (ADDERALL XR) 30 MG XR capsule, Take 1 Capsule by mouth every morning for 30 days., Disp: 30 Capsule, Rfl: 0    traZODone (DESYREL) 100 MG Tab, Take 1.5-2 Tablets by mouth at bedtime as needed for Sleep., Disp: 180 Tablet, Rfl: 1    aspirin 81 MG EC tablet, Take 1 Tablet by mouth every day. (Patient not taking: Reported on 12/16/2024), Disp: 100 Tablet, Rfl: 3    lisinopril (PRINIVIL) 20 MG Tab, Take 1 Tablet by mouth every day., Disp: 100 Tablet, Rfl: 3    rosuvastatin (CRESTOR) 20 MG Tab, TAKE 1 TABLET BY MOUTH EVERY EVENING, Disp: 100 Tablet, Rfl: 3    MAGNESIUM PO, , Disp: , Rfl:     tolterodine ER (DETROL LA) 4 MG CAPSULE SR 24 HR, , Disp: , Rfl:     Calcium Carbonate-Vitamin D (CALCIUM-D PO), Take 2 Tablets by mouth every day., Disp: , Rfl:     reports that she has never smoked. She has never used smokeless tobacco. She reports current alcohol use. She reports current drug use. Drug: Marijuana.   Social History     Tobacco Use    Smoking status: Never    Smokeless tobacco: Never   Vaping Use    Vaping status: Never Used   Substance and Sexual Activity     Alcohol use: Yes     Comment: 1-2 times a month    Drug use: Yes     Types: Marijuana     Comment: once a month    Sexual activity: Not Currently     Birth control/protection: Post-Menopausal      Social Drivers of Health     Alcohol Use: Not At Risk (1/10/2024)    AUDIT-C     Frequency of Alcohol Consumption: Never     Average Number of Drinks: Patient does not drink     Frequency of Binge Drinking: Never   Depression: At risk (12/16/2024)    PHQ-2     PHQ-2 Score: 3   Financial Resource Strain: Low Risk  (1/10/2024)    Overall Financial Resource Strain (CARDIA)     Difficulty of Paying Living Expenses: Not hard at all   Recent Concern: Financial Resource Strain - High Risk (11/9/2023)    Overall Financial Resource Strain (CARDIA)     Difficulty of Paying Living Expenses: Hard   Food Insecurity: No Food Insecurity (1/10/2024)    Hunger Vital Sign     Worried About Running Out of Food in the Last Year: Never true     Ran Out of Food in the Last Year: Never true   Recent Concern: Food Insecurity - Food Insecurity Present (11/9/2023)    Hunger Vital Sign     Worried About Running Out of Food in the Last Year: Sometimes true     Ran Out of Food in the Last Year: Never true   Housing Stability: Low Risk  (1/10/2024)    Housing Stability Vital Sign     Unable to Pay for Housing in the Last Year: No     Number of Places Lived in the Last Year: 1     Unstable Housing in the Last Year: No   Intimate Partner Violence: Not on file   Physical Activity: Insufficiently Active (11/9/2023)    Exercise Vital Sign     Days of Exercise per Week: 1 day     Minutes of Exercise per Session: 10 min   Social Connections: Socially Isolated (1/10/2024)    Social Connection and Isolation Panel [NHANES]     Frequency of Communication with Friends and Family: Once a week     Frequency of Social Gatherings with Friends and Family: Never     Attends Rastafarian Services: Never     Active Member of Clubs or Organizations: No     Attends Club or  Organization Meetings: Never     Marital Status:    Stress: No Stress Concern Present (1/10/2024)    [place] [place] [employer] - Occupational Stress Questionnaire     Feeling of Stress : Not at all   Tobacco Use: Low Risk  (2/6/2025)    Patient History     Smoking Tobacco Use: Never     Smokeless Tobacco Use: Never     Passive Exposure: Not on file   Transportation Needs: No Transportation Needs (1/10/2024)    PRAPARE - Transportation     Lack of Transportation (Medical): No     Lack of Transportation (Non-Medical): No   Utilities: Not At Risk (1/10/2024)    Utilities     Threatened with loss of utilities: No        Measures Administered: Garrett Anxiety Inventory (PORTER), Garrett Depression Inventory, 2nd Ed. (BDI-II), [place] Naming Test (BNT), Brief Visuospatial Memory Test, Revised (BVMT-R), [place] Verbal Learning Test, 2nd Ed. (CVLT-II), Lucretia-Webber Executive Functioning System (DKEFS; Color-Word Interference & Verbal Fluency), Digit Vigilance Test (DVT), Executive Clock Drawing Test (CLOX), Generalized Anxiety Disorder 7 Item Scale (PAT-7), Cr Verbal Learning Test, Revised (HVLT-R), Mini-Mental State Examination, 2nd Ed. (MMSE-2; Orientation), Patient Health Questionnaire (PHQ-9), Repeatable Battery for the Assessment of Neuropsychological Status (RBANS; Line Orientation), State-Trait Anxiety Inventory (STAI), Trail Making Test (TMT), Wechsler Adult Intelligence Scale, 4th Ed. (WAIS-IV; Block Design, Matrix Reasoning, Digit Span, Similarities, Information, & Coding), Wechsler Memory Scale, 4th Ed. (WMS-IV; Logical Memory), Wide Range Achievement Test, 4th Ed. (WRAT-4; Reading) & [place] Card Sorting Test (WCST-128). Informant Questionnaires: Activities of Daily Living Questionnaire (ADLQ) and Neuropsychiatric Inventory Questionnaire (NPI-Q).    Measures Administered: Adult ADHD Self Report Scale (ASRS), [place] Naming Test (BNT), Brief Visuospatial Memory Test, Revised (BVMT-R),  [place] Verbal Learning Test, 2nd Ed. (CVLT-II), Alexa Continuous Performance Test, 3rd Ed. (CPT-3), Lucretia-Webber Executive Functioning System  - Color-Word Interference, Verbal Fluency, [place] (DKEFS), Depression, Anxiety, and Stress Scales (DAAS), Judgment of Line Orientation, Short Form Q (JOLO-SFQ), Mini-Mental State Examination, 2nd Ed. - Orientation (MMSE-2), Performance Validity Measures, Personality Assessment Inventory (KARIME), Ricardo-Osterrieth Complex Figure Test - Copy Trial (RCFT), Trail Making Test (TMT), Wechsler Adult Intelligence Scale, 4th Ed. - Block Design, Matrix Reasoning, Digit Span, Arithmetic, Similarities, Information, Coding, and Symbol Search (WAIS-IV), [place] Rating Scale (WURS), & Wide Range Achievement Test, 4th Ed.- Reading (WRAT-4).     Measures Administered: [place] Naming Test (BNT); Brief Visuospatial Memory Test - Revised (BVMT-R); Controlled Oral Word Association Test (COWAT; FAS); Category Fluency (Animals); Digit Vigilance Test (DVT); Executive Clock Drawing Test (CLOX); Generalized Anxiety Disorder 7 Item Scale (PAT-7); Cr Verbal Learning Test - Revised (HVLT-R); Mini-Mental State Examination - 2nd Ed. - Brief Version (MMSE-2-BV); Modified [place] Card Sorting Test (M-WCST); Patient Health Questionnaire (PHQ-9); Patient-Reported Outcomes Measurement Information System (PROMIS; Pain Intensity, Pain Behaviors, Pain Interference); Repeatable Battery for the Assessment of Neuropsychological Status Line Orientation (RBANS LO); Stroop Color and Word Test (Stroop); Test of Premorbid Functioning (ToPF); Trail Making Test A & B (TMT); Wechsler Adult Intelligence Scale - 4th Ed. (WAIS-IV) Block Design (BD), Digit Span (DS), Matrix Reasoning (MR), Similarities (SI), Information (IN), Coding (CD); and Wechsler Memory Scale - 4th Ed. Logical Memory (WMS-IV; LM). Informant Questionnaires: Activities of Daily Living Questionnaire (ADLQ) and Neuropsychiatric Inventory Questionnaire  (NPI-Q).    Measures Administered: [place] Naming Test (BNT); Brief Visuospatial Memory Test - Revised (BVMT-R); Category Fluency (Animals); Controlled Oral Word Association Test (COWAT FAS); Lucretia-Webber Executive Functioning System (DKEFS) Color-Word Interference (CWI); Finger Tapping Test (FTT); Generalized Anxiety Disorder 7 Item Scale (PAT-7); Mini-Mental State Examination - 2nd Ed. (MMSE-2); Neuropsychological Assessment Battery (NAB) Form 1: Numbers and Letters (N & L); Patient Health Questionnaire (PHQ-9); Performance Validity Measures; Personality Assessment Inventory (KARIME); Repeatable Battery for the Assessment of Neuropsychological Status Line Orientation (RBANS LO); Ricardo Auditory Verbal Learning Test (RAVLT); Ricardo-Osterrieth Complex Figure Test - Copy Trial (RCFT-CT); Symbol Digit Modalities Test (SDMT); Test of Premorbid Functioning (TOPF); Trail Making Test A & B (TMT); Wechsler Adult Intelligence Scale - 4th Ed. (WAIS-IV): Block Design (BD), Digit Span (DS), Arithmetic (AR), Matrix Reasoning (MR), Visual Puzzles (), Similarities (SI), Vocabulary (VC), & Information (IN); Wechsler Memory Scale - 4th Ed. Logical Memory (WMS-IV LM); and [place] Card Sorting Test - 64 (WCST-64). Informant Questionnaires: Activities of Daily Living Questionnaire (ADLQ) and Neuropsychiatric Inventory Questionnaire (NPI-Q).    Measures Administered: [place] Naming Test (BNT); Brief Visuospatial Memory Test - Revised (BVMT-R); Category Fluency (Animals); Controlled Oral Word Association Test (COWAT FAS); Lucretia-Webber Executive Functioning System (DKEFS): Color-Word Interference (CWI) & Verbal Fluency (VF); Digit Vigilance Test (DVT); Executive Clock Drawing Test (CLOX); Generalized Anxiety Disorder 7 Item Scale (PAT-7); Cr Verbal Learning Test - Revised (HVLT-R); Mini-Mental State Examination - 2nd Ed. Orientation (MMSE-2); Neuropsychological Assessment Battery (NAB) Form 1: Dots; Patient Health Questionnaire (PHQ-9);  Performance Validity Measures; Repeatable Battery for the Assessment of Neuropsychological Status Line Orientation (RBANS LO); Test of Practical Judgment (TOPJ); Test of Premorbid Functioning (TOPF); Trail Making Test A & B (TMT); Wechsler Adult Intelligence Scale - 4th Ed. (WAIS-IV): Block Design (BD), Digit Span (DS), Matrix Reasoning (MR), Similarities (SI), Vocabulary (VC), Information (IN), & Coding (CD); Wechsler Memory Scale - 4th Ed. Logical Memory (WMS-IV LM); and Wide Range Achievement Test, 5th Ed.- Word Reading (WRAT-5 WR); and [place] Card Sorting Test - 64 (WCST-64). Informant Questionnaires: Activities of Daily Living Questionnaire (ADLQ) and Neuropsychiatric Inventory Questionnaire (NPI-Q).    Measures Administered: [place] Diagnostic Aphasia Examination 3rd. Ed. (BDAE-3): Basic Word Discrimination, Commands, Complex Ideational Material, Sentence Repetition, Word Repetition, & Responsive Naming; [place] Naming Test (BNT); Brief Visuospatial Memory Test - Revised (BVMT-R); Lucretia-Webber Executive Functioning System (DKEFS): Color-Word Interference (CWI) & Verbal Fluency (VF); Executive Clock Drawing Test (CLOX); Generalized Anxiety Disorder 7 Item Scale (PAT-7); Cr Verbal Learning Test - Revised (HVLT-R); Mini-Mental State Examination - 2nd Ed. (MMSE-2); Neuropsychological Assessment Battery (NAB) Form 1: Dots & Oral Production (OP); Oral Trail Making Test A & B (O-TMT); Patient Health Questionnaire (PHQ-9); Repeatable Battery for the Assessment of Neuropsychological Status Line Orientation (RBANS LO); Test of Practical Judgment (TOPJ); Test of Premorbid Functioning (TOPF); Trail Making Test A & B (TMT); Wechsler Adult Intelligence Scale - 4th Ed. (WAIS-IV): Block Design (BD), Digit Span (DS), Matrix Reasoning (MR), Similarities (SI), Vocabulary (VC), Information (IN), & Coding (CD); and Wechsler Memory Scale - 4th Ed. Logical Memory (WMS-IV LM). Informant Questionnaires: Activities of Daily  Living Questionnaire (ADLQ) and Neuropsychiatric Inventory Questionnaire (NPI-Q).     Behavioral Observations: The patient arrived at the clinic on time and was unaccompanied/accompanied by his/her wife/, who participated in the clinical interview. S/He was well groomed and dressed. S/He was alert and fully oriented to situation, person, place, and time (MMSE-2 Orientation = 10/10). S/He was alert and oriented to situation and person, but incompletely oriented to place and time (MMSE-2 Orientation = 0/10; incorrect year, season, month, day of the week, date, state, county, city/town, building name, and building floor ).S/He was polite and always maintained appropriate interpersonal boundaries. Rapport was easily established. Gait was unremarkable. No gross abnormal movements or motor symptoms were observed. S/He provided information during the interview without difficulty. Speech rate, volume, articulation, and prosody were normal. Speech content was logical and appropriate to context. Expressive and receptive language were intact during conversation. Mood was euthymic during the appointment/reportedly “.” Affect was mood-congruent and appropriate to the situation. Insight into cognitive and emotional functioning was intact. There was no indication of hallucinations, delusions, or thought disorder. Vision (corrected with glasses) and hearing (corrected with bilateral hearing aids) were adequate for the evaluation. S/He was attentive throughout the evaluation and had no difficulties with retention of task demands. S/He worked efficiently for the duration of the evaluation. Response style was unremarkable. There was no evidence of overt fatigue, frustration, impulsivity, or disinhibition. Overall, s/he was engaged and cooperative throughout testing.      Patti Sims, Ph.D.          Clinical Neuropsychologist          Department of Neurology          [facility]           One hour and *** minutes were spent  interviewing the patient (neurobehavioral status exam: 39627 = 1). Test administration and scoring were completed in *** hours and *** minutes (27097 = 1, 78128 = 7).      I spent one hour and *** minutes interviewing the patient (neurobehavioral status exam: 20941 = 1). *** hours and *** minutes of technician time were spent in test administration and scoring under my supervision (31211 = 1, 83190 = 7).

## 2025-02-07 ENCOUNTER — HOSPITAL ENCOUNTER (OUTPATIENT)
Dept: BEHAVIORAL HEALTH | Facility: MEDICAL CENTER | Age: 75
End: 2025-02-07
Attending: PSYCHIATRY & NEUROLOGY
Payer: MEDICARE

## 2025-02-07 PROCEDURE — 99999 PR NO CHARGE: CPT | Performed by: PSYCHIATRY & NEUROLOGY

## 2025-02-07 PROCEDURE — 90868 TCRANIAL MAGN STIM TX DELI: CPT | Performed by: PSYCHIATRY & NEUROLOGY

## 2025-02-10 ENCOUNTER — HOSPITAL ENCOUNTER (OUTPATIENT)
Dept: BEHAVIORAL HEALTH | Facility: MEDICAL CENTER | Age: 75
End: 2025-02-10
Attending: PSYCHIATRY & NEUROLOGY
Payer: MEDICARE

## 2025-02-10 PROCEDURE — 99999 PR NO CHARGE: CPT | Performed by: PSYCHIATRY & NEUROLOGY

## 2025-02-10 PROCEDURE — 90868 TCRANIAL MAGN STIM TX DELI: CPT | Performed by: PSYCHIATRY & NEUROLOGY

## 2025-02-11 ENCOUNTER — HOSPITAL ENCOUNTER (OUTPATIENT)
Dept: BEHAVIORAL HEALTH | Facility: MEDICAL CENTER | Age: 75
End: 2025-02-11
Attending: PSYCHIATRY & NEUROLOGY
Payer: MEDICARE

## 2025-02-11 PROCEDURE — 90868 TCRANIAL MAGN STIM TX DELI: CPT | Performed by: PSYCHIATRY & NEUROLOGY

## 2025-02-12 ENCOUNTER — HOSPITAL ENCOUNTER (OUTPATIENT)
Dept: BEHAVIORAL HEALTH | Facility: MEDICAL CENTER | Age: 75
End: 2025-02-12
Attending: PSYCHIATRY & NEUROLOGY
Payer: MEDICARE

## 2025-02-12 PROCEDURE — 90868 TCRANIAL MAGN STIM TX DELI: CPT | Performed by: PSYCHIATRY & NEUROLOGY

## 2025-02-13 ENCOUNTER — HOSPITAL ENCOUNTER (OUTPATIENT)
Dept: BEHAVIORAL HEALTH | Facility: MEDICAL CENTER | Age: 75
End: 2025-02-13
Attending: PSYCHIATRY & NEUROLOGY
Payer: MEDICARE

## 2025-02-13 PROCEDURE — 90868 TCRANIAL MAGN STIM TX DELI: CPT | Performed by: PSYCHIATRY & NEUROLOGY

## 2025-02-14 ENCOUNTER — APPOINTMENT (OUTPATIENT)
Dept: BEHAVIORAL HEALTH | Facility: MEDICAL CENTER | Age: 75
End: 2025-02-14
Attending: PSYCHIATRY & NEUROLOGY
Payer: MEDICARE

## 2025-02-17 ENCOUNTER — APPOINTMENT (OUTPATIENT)
Dept: BEHAVIORAL HEALTH | Facility: MEDICAL CENTER | Age: 75
End: 2025-02-17
Attending: PSYCHIATRY & NEUROLOGY
Payer: MEDICARE

## 2025-02-18 ENCOUNTER — HOSPITAL ENCOUNTER (OUTPATIENT)
Dept: BEHAVIORAL HEALTH | Facility: MEDICAL CENTER | Age: 75
End: 2025-02-18
Attending: PSYCHIATRY & NEUROLOGY
Payer: MEDICARE

## 2025-02-18 PROCEDURE — 90868 TCRANIAL MAGN STIM TX DELI: CPT | Performed by: PSYCHIATRY & NEUROLOGY

## 2025-02-19 ENCOUNTER — HOSPITAL ENCOUNTER (OUTPATIENT)
Dept: BEHAVIORAL HEALTH | Facility: MEDICAL CENTER | Age: 75
End: 2025-02-19
Attending: PSYCHIATRY & NEUROLOGY
Payer: MEDICARE

## 2025-02-19 PROCEDURE — 90868 TCRANIAL MAGN STIM TX DELI: CPT | Performed by: PSYCHIATRY & NEUROLOGY

## 2025-02-20 ENCOUNTER — HOSPITAL ENCOUNTER (OUTPATIENT)
Dept: BEHAVIORAL HEALTH | Facility: MEDICAL CENTER | Age: 75
End: 2025-02-20
Attending: PSYCHIATRY & NEUROLOGY
Payer: MEDICARE

## 2025-02-20 PROCEDURE — 90868 TCRANIAL MAGN STIM TX DELI: CPT | Performed by: PSYCHIATRY & NEUROLOGY

## 2025-02-21 ENCOUNTER — HOSPITAL ENCOUNTER (OUTPATIENT)
Dept: BEHAVIORAL HEALTH | Facility: MEDICAL CENTER | Age: 75
End: 2025-02-21
Attending: PSYCHIATRY & NEUROLOGY
Payer: MEDICARE

## 2025-02-21 PROCEDURE — 90868 TCRANIAL MAGN STIM TX DELI: CPT | Performed by: PSYCHIATRY & NEUROLOGY

## 2025-02-24 ENCOUNTER — HOSPITAL ENCOUNTER (OUTPATIENT)
Dept: BEHAVIORAL HEALTH | Facility: MEDICAL CENTER | Age: 75
End: 2025-02-24
Attending: PSYCHIATRY & NEUROLOGY
Payer: MEDICARE

## 2025-02-24 PROCEDURE — 90868 TCRANIAL MAGN STIM TX DELI: CPT | Performed by: PSYCHIATRY & NEUROLOGY

## 2025-02-25 ENCOUNTER — HOSPITAL ENCOUNTER (OUTPATIENT)
Dept: BEHAVIORAL HEALTH | Facility: MEDICAL CENTER | Age: 75
End: 2025-02-25
Attending: PSYCHIATRY & NEUROLOGY
Payer: MEDICARE

## 2025-02-25 PROCEDURE — 90868 TCRANIAL MAGN STIM TX DELI: CPT | Performed by: PSYCHIATRY & NEUROLOGY

## 2025-02-26 ENCOUNTER — HOSPITAL ENCOUNTER (OUTPATIENT)
Dept: BEHAVIORAL HEALTH | Facility: MEDICAL CENTER | Age: 75
End: 2025-02-26
Attending: PSYCHIATRY & NEUROLOGY
Payer: MEDICARE

## 2025-02-26 PROCEDURE — 90868 TCRANIAL MAGN STIM TX DELI: CPT | Performed by: PSYCHIATRY & NEUROLOGY

## 2025-02-27 ENCOUNTER — HOSPITAL ENCOUNTER (OUTPATIENT)
Dept: BEHAVIORAL HEALTH | Facility: MEDICAL CENTER | Age: 75
End: 2025-02-27
Attending: PSYCHIATRY & NEUROLOGY
Payer: MEDICARE

## 2025-02-27 PROCEDURE — 90868 TCRANIAL MAGN STIM TX DELI: CPT | Performed by: PSYCHIATRY & NEUROLOGY

## 2025-02-28 ENCOUNTER — APPOINTMENT (OUTPATIENT)
Dept: BEHAVIORAL HEALTH | Facility: CLINIC | Age: 75
End: 2025-02-28
Payer: MEDICARE

## 2025-02-28 DIAGNOSIS — R41.9 COGNITIVE COMPLAINTS WITH NORMAL NEUROPSYCHOLOGICAL EXAM: Primary | ICD-10-CM

## 2025-02-28 DIAGNOSIS — F90.0 ATTENTION DEFICIT HYPERACTIVITY DISORDER (ADHD), PREDOMINANTLY INATTENTIVE TYPE: ICD-10-CM

## 2025-02-28 DIAGNOSIS — F33.1 MODERATE EPISODE OF RECURRENT MAJOR DEPRESSIVE DISORDER (HCC): ICD-10-CM

## 2025-02-28 PROCEDURE — 96132 NRPSYC TST EVAL PHYS/QHP 1ST: CPT | Performed by: CLINICAL NEUROPSYCHOLOGIST

## 2025-02-28 ASSESSMENT — PATIENT HEALTH QUESTIONNAIRE - PHQ9
5. POOR APPETITE OR OVEREATING: 3 - NEARLY EVERY DAY
SUM OF ALL RESPONSES TO PHQ QUESTIONS 1-9: 17
CLINICAL INTERPRETATION OF PHQ2 SCORE: 5

## 2025-03-02 NOTE — PATIENT INSTRUCTIONS
Thank you for your effort during today's evaluation. We will have a feedback session to discuss your results on 3 PM at 02/28/2025.

## 2025-03-03 ENCOUNTER — APPOINTMENT (OUTPATIENT)
Dept: BEHAVIORAL HEALTH | Facility: CLINIC | Age: 75
End: 2025-03-03
Payer: MEDICARE

## 2025-03-03 ENCOUNTER — HOSPITAL ENCOUNTER (OUTPATIENT)
Dept: BEHAVIORAL HEALTH | Facility: MEDICAL CENTER | Age: 75
End: 2025-03-03
Attending: PSYCHIATRY & NEUROLOGY
Payer: MEDICARE

## 2025-03-03 DIAGNOSIS — F33.2 MDD (MAJOR DEPRESSIVE DISORDER), RECURRENT SEVERE, WITHOUT PSYCHOSIS (HCC): ICD-10-CM

## 2025-03-03 DIAGNOSIS — G47.00 INSOMNIA, UNSPECIFIED TYPE: ICD-10-CM

## 2025-03-03 DIAGNOSIS — F90.0 ATTENTION DEFICIT HYPERACTIVITY DISORDER (ADHD), PREDOMINANTLY INATTENTIVE TYPE: ICD-10-CM

## 2025-03-03 DIAGNOSIS — F90.0 ADHD (ATTENTION DEFICIT HYPERACTIVITY DISORDER), INATTENTIVE TYPE: ICD-10-CM

## 2025-03-03 PROCEDURE — 90868 TCRANIAL MAGN STIM TX DELI: CPT | Performed by: PSYCHIATRY & NEUROLOGY

## 2025-03-03 PROCEDURE — 90833 PSYTX W PT W E/M 30 MIN: CPT | Mod: 95 | Performed by: PSYCHIATRY & NEUROLOGY

## 2025-03-03 PROCEDURE — 99214 OFFICE O/P EST MOD 30 MIN: CPT | Mod: 95 | Performed by: PSYCHIATRY & NEUROLOGY

## 2025-03-03 RX ORDER — DEXTROAMPHETAMINE SACCHARATE, AMPHETAMINE ASPARTATE MONOHYDRATE, DEXTROAMPHETAMINE SULFATE AND AMPHETAMINE SULFATE 7.5; 7.5; 7.5; 7.5 MG/1; MG/1; MG/1; MG/1
30 CAPSULE, EXTENDED RELEASE ORAL EVERY MORNING
Qty: 30 CAPSULE | Refills: 0 | Status: SHIPPED | OUTPATIENT
Start: 2025-04-20 | End: 2025-05-20

## 2025-03-03 RX ORDER — DEXTROAMPHETAMINE SACCHARATE, AMPHETAMINE ASPARTATE MONOHYDRATE, DEXTROAMPHETAMINE SULFATE AND AMPHETAMINE SULFATE 7.5; 7.5; 7.5; 7.5 MG/1; MG/1; MG/1; MG/1
30 CAPSULE, EXTENDED RELEASE ORAL EVERY MORNING
Qty: 30 CAPSULE | Refills: 0 | Status: SHIPPED | OUTPATIENT
Start: 2025-03-22 | End: 2025-04-21

## 2025-03-03 RX ORDER — DEXTROAMPHETAMINE SACCHARATE, AMPHETAMINE ASPARTATE MONOHYDRATE, DEXTROAMPHETAMINE SULFATE AND AMPHETAMINE SULFATE 7.5; 7.5; 7.5; 7.5 MG/1; MG/1; MG/1; MG/1
30 CAPSULE, EXTENDED RELEASE ORAL EVERY MORNING
Qty: 30 CAPSULE | Refills: 0 | Status: SHIPPED | OUTPATIENT
Start: 2025-05-20 | End: 2025-06-19

## 2025-03-03 RX ORDER — ATOMOXETINE 25 MG/1
25 CAPSULE ORAL DAILY
Qty: 180 CAPSULE | Refills: 1 | Status: SHIPPED | OUTPATIENT
Start: 2025-03-03
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
## 2025-03-04 ENCOUNTER — HOSPITAL ENCOUNTER (OUTPATIENT)
Dept: BEHAVIORAL HEALTH | Facility: MEDICAL CENTER | Age: 75
End: 2025-03-04
Attending: PSYCHIATRY & NEUROLOGY
Payer: MEDICARE

## 2025-03-04 PROCEDURE — 90868 TCRANIAL MAGN STIM TX DELI: CPT | Performed by: PSYCHIATRY & NEUROLOGY

## 2025-03-05 ENCOUNTER — HOSPITAL ENCOUNTER (OUTPATIENT)
Dept: BEHAVIORAL HEALTH | Facility: MEDICAL CENTER | Age: 75
End: 2025-03-05
Attending: PSYCHIATRY & NEUROLOGY
Payer: MEDICARE

## 2025-03-05 PROCEDURE — 90868 TCRANIAL MAGN STIM TX DELI: CPT | Performed by: PSYCHIATRY & NEUROLOGY

## 2025-03-06 ENCOUNTER — HOSPITAL ENCOUNTER (OUTPATIENT)
Dept: BEHAVIORAL HEALTH | Facility: MEDICAL CENTER | Age: 75
End: 2025-03-06
Attending: PSYCHIATRY & NEUROLOGY
Payer: MEDICARE

## 2025-03-06 PROCEDURE — 99999 PR NO CHARGE: CPT | Performed by: PSYCHIATRY & NEUROLOGY

## 2025-03-06 PROCEDURE — 90868 TCRANIAL MAGN STIM TX DELI: CPT | Performed by: PSYCHIATRY & NEUROLOGY

## 2025-03-07 ENCOUNTER — HOSPITAL ENCOUNTER (OUTPATIENT)
Dept: BEHAVIORAL HEALTH | Facility: MEDICAL CENTER | Age: 75
End: 2025-03-07
Attending: PSYCHIATRY & NEUROLOGY
Payer: MEDICARE

## 2025-03-07 PROCEDURE — 99999 PR NO CHARGE: CPT | Performed by: PSYCHIATRY & NEUROLOGY

## 2025-03-07 PROCEDURE — 90868 TCRANIAL MAGN STIM TX DELI: CPT | Performed by: PSYCHIATRY & NEUROLOGY

## 2025-03-10 ENCOUNTER — HOSPITAL ENCOUNTER (OUTPATIENT)
Dept: BEHAVIORAL HEALTH | Facility: MEDICAL CENTER | Age: 75
End: 2025-03-10
Attending: PSYCHIATRY & NEUROLOGY
Payer: MEDICARE

## 2025-03-10 PROCEDURE — 90868 TCRANIAL MAGN STIM TX DELI: CPT | Performed by: PSYCHIATRY & NEUROLOGY

## 2025-03-10 PROCEDURE — 99999 PR NO CHARGE: CPT | Performed by: PSYCHIATRY & NEUROLOGY

## 2025-03-10 NOTE — PROGRESS NOTES
Bhupinder Florian is following my treatment plan in today's treatment. I reviewed her note and agree with today's treatment planning.

## 2025-03-11 ENCOUNTER — HOSPITAL ENCOUNTER (OUTPATIENT)
Dept: BEHAVIORAL HEALTH | Facility: MEDICAL CENTER | Age: 75
End: 2025-03-11
Attending: PSYCHIATRY & NEUROLOGY
Payer: MEDICARE

## 2025-03-11 PROCEDURE — 90868 TCRANIAL MAGN STIM TX DELI: CPT | Performed by: PSYCHIATRY & NEUROLOGY

## 2025-03-11 PROCEDURE — 99999 PR NO CHARGE: CPT | Performed by: PSYCHIATRY & NEUROLOGY

## 2025-03-13 ENCOUNTER — HOSPITAL ENCOUNTER (OUTPATIENT)
Dept: BEHAVIORAL HEALTH | Facility: MEDICAL CENTER | Age: 75
End: 2025-03-13
Attending: PSYCHIATRY & NEUROLOGY
Payer: MEDICARE

## 2025-03-13 DIAGNOSIS — I10 PRIMARY HYPERTENSION: ICD-10-CM

## 2025-03-13 DIAGNOSIS — E78.5 HYPERLIPIDEMIA, UNSPECIFIED HYPERLIPIDEMIA TYPE: ICD-10-CM

## 2025-03-13 PROCEDURE — 99999 PR NO CHARGE: CPT | Performed by: PSYCHIATRY & NEUROLOGY

## 2025-03-13 PROCEDURE — 90868 TCRANIAL MAGN STIM TX DELI: CPT | Performed by: PSYCHIATRY & NEUROLOGY

## 2025-03-13 RX ORDER — LISINOPRIL 20 MG/1
20 TABLET ORAL DAILY
Qty: 100 TABLET | Refills: 3 | Status: SHIPPED | OUTPATIENT
Start: 2025-03-13

## 2025-03-13 RX ORDER — ROSUVASTATIN CALCIUM 20 MG/1
20 TABLET, COATED ORAL EVERY EVENING
Qty: 100 TABLET | Refills: 3 | Status: SHIPPED | OUTPATIENT
Start: 2025-03-13

## 2025-03-13 NOTE — TELEPHONE ENCOUNTER
Received request via: Pharmacy    Was the patient seen in the last year in this department? Yes    Does the patient have an active prescription (recently filled or refills available) for medication(s) requested? No    Pharmacy Name: [facility] [address] [facility] [address]    Does the patient have prison Plus and need 100-day supply? (This applies to ALL medications) Patient has SCP quantity already changed    Future Appointments         Provider Department Center    3/13/2025 3:00 PM TMS MACHINE [facility] [address]    3/14/2025 3:00 PM [facility]ACHINE [facility] [address]    6/2/2025 11:00 AM Alyssa Carrion M.D. [facility] [address]

## 2025-03-13 NOTE — TELEPHONE ENCOUNTER
Received request via: Pharmacy    Was the patient seen in the last year in this department? Yes    Does the patient have an active prescription (recently filled or refills available) for medication(s) requested? No    Pharmacy Name: [facility] [address] [facility] [address]    Does the patient have care home Plus and need 100-day supply? (This applies to ALL medications) Has SCP quantity already changed

## 2025-03-14 ENCOUNTER — HOSPITAL ENCOUNTER (OUTPATIENT)
Dept: BEHAVIORAL HEALTH | Facility: MEDICAL CENTER | Age: 75
End: 2025-03-14
Attending: PSYCHIATRY & NEUROLOGY
Payer: MEDICARE

## 2025-03-14 PROCEDURE — 99999 PR NO CHARGE: CPT | Performed by: PSYCHIATRY & NEUROLOGY

## 2025-03-14 PROCEDURE — 90868 TCRANIAL MAGN STIM TX DELI: CPT | Performed by: PSYCHIATRY & NEUROLOGY

## 2025-03-14 NOTE — PROGRESS NOTES
Bhupnider Florian is following my treatment plan in today's treatment. I reviewed her notes and agree with the today's treatment planning.

## 2025-03-17 ENCOUNTER — APPOINTMENT (OUTPATIENT)
Dept: BEHAVIORAL HEALTH | Facility: MEDICAL CENTER | Age: 75
End: 2025-03-17
Attending: PSYCHIATRY & NEUROLOGY
Payer: MEDICARE

## 2025-03-25 ENCOUNTER — HOSPITAL ENCOUNTER (OUTPATIENT)
Dept: BEHAVIORAL HEALTH | Facility: MEDICAL CENTER | Age: 75
End: 2025-03-25
Attending: PSYCHIATRY & NEUROLOGY
Payer: MEDICARE

## 2025-03-25 PROCEDURE — 99999 PR NO CHARGE: CPT | Performed by: PSYCHIATRY & NEUROLOGY

## 2025-03-25 PROCEDURE — 90868 TCRANIAL MAGN STIM TX DELI: CPT | Performed by: PSYCHIATRY & NEUROLOGY

## 2025-03-25 NOTE — PROGRESS NOTES
Bhupinder Florian is following my treatment plan in today's treatment. I reviewed her notes and agree with the today's treatment planning.       Care Management Discharge Note    Discharge Date: 07/21/2023       Discharge Disposition: Group Home, Home Care    Discharge Services: None    Discharge DME:  (per treatment team)    Discharge Transportation:   will transport  Private pay costs discussed: NA    Does the patient's insurance plan have a 3 day qualifying hospital stay waiver?  NA    PAS Confirmation Code: NA   Patient/family educated on Medicare website which has current facility and service quality ratings:  (N/A)    Education Provided on the Discharge Plan: Yes  Persons Notified of Discharge Plans: Charlene PEDRO and guardian, Mother,Chrissy  Patient/Family in Agreement with the Plan: yes    Handoff Referral Completed: yes    Additional Information:  Patient to return to [facility].  will transport at 1000, Per  discharge orders faxed to [facility] at [phone].        RICHIE Douglas

## 2025-05-08 DIAGNOSIS — G47.09 OTHER INSOMNIA: ICD-10-CM

## 2025-05-08 RX ORDER — TRAZODONE HYDROCHLORIDE 100 MG/1
150 TABLET ORAL
Qty: 45 TABLET | Refills: 0 | Status: SHIPPED | OUTPATIENT
Start: 2025-05-08

## 2025-06-02 ENCOUNTER — APPOINTMENT (OUTPATIENT)
Dept: BEHAVIORAL HEALTH | Facility: CLINIC | Age: 75
End: 2025-06-02
Payer: MEDICARE

## 2025-06-02 DIAGNOSIS — F90.0 ATTENTION DEFICIT HYPERACTIVITY DISORDER (ADHD), PREDOMINANTLY INATTENTIVE TYPE: ICD-10-CM

## 2025-06-02 PROCEDURE — 99214 OFFICE O/P EST MOD 30 MIN: CPT | Mod: 95 | Performed by: PSYCHIATRY & NEUROLOGY

## 2025-06-02 RX ORDER — DEXTROAMPHETAMINE SACCHARATE, AMPHETAMINE ASPARTATE, DEXTROAMPHETAMINE SULFATE AND AMPHETAMINE SULFATE 2.5; 2.5; 2.5; 2.5 MG/1; MG/1; MG/1; MG/1
10 TABLET ORAL
Qty: 30 TABLET | Refills: 0 | Status: SHIPPED | OUTPATIENT
Start: 2025-07-02 | End: 2025-08-01

## 2025-06-02 RX ORDER — DEXTROAMPHETAMINE SACCHARATE, AMPHETAMINE ASPARTATE, DEXTROAMPHETAMINE SULFATE AND AMPHETAMINE SULFATE 2.5; 2.5; 2.5; 2.5 MG/1; MG/1; MG/1; MG/1
10 TABLET ORAL
Qty: 30 TABLET | Refills: 0 | Status: SHIPPED | OUTPATIENT
Start: 2025-06-02 | End: 2025-07-02

## 2025-06-02 RX ORDER — DEXTROAMPHETAMINE SACCHARATE, AMPHETAMINE ASPARTATE MONOHYDRATE, DEXTROAMPHETAMINE SULFATE AND AMPHETAMINE SULFATE 7.5; 7.5; 7.5; 7.5 MG/1; MG/1; MG/1; MG/1
30 CAPSULE, EXTENDED RELEASE ORAL EVERY MORNING
Qty: 30 CAPSULE | Refills: 0 | Status: SHIPPED | OUTPATIENT
Start: 2025-06-26 | End: 2025-07-26

## 2025-06-02 RX ORDER — DEXTROAMPHETAMINE SACCHARATE, AMPHETAMINE ASPARTATE MONOHYDRATE, DEXTROAMPHETAMINE SULFATE AND AMPHETAMINE SULFATE 7.5; 7.5; 7.5; 7.5 MG/1; MG/1; MG/1; MG/1
30 CAPSULE, EXTENDED RELEASE ORAL EVERY MORNING
Qty: 30 CAPSULE | Refills: 0 | Status: SHIPPED | OUTPATIENT
Start: 2025-07-27 | End: 2025-08-26

## 2025-06-02 RX ORDER — DEXTROAMPHETAMINE SACCHARATE, AMPHETAMINE ASPARTATE MONOHYDRATE, DEXTROAMPHETAMINE SULFATE AND AMPHETAMINE SULFATE 7.5; 7.5; 7.5; 7.5 MG/1; MG/1; MG/1; MG/1
30 CAPSULE, EXTENDED RELEASE ORAL EVERY MORNING
Qty: 30 CAPSULE | Refills: 0 | Status: SHIPPED | OUTPATIENT
Start: 2025-08-27 | End: 2025-09-26
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
## 2025-06-11 ENCOUNTER — OFFICE VISIT (OUTPATIENT)
Dept: URGENT CARE | Facility: CLINIC | Age: 75
End: 2025-06-11
Payer: MEDICARE

## 2025-06-11 VITALS
DIASTOLIC BLOOD PRESSURE: 78 MMHG | BODY MASS INDEX: 23.74 KG/M2 | SYSTOLIC BLOOD PRESSURE: 124 MMHG | TEMPERATURE: 98.1 F | HEIGHT: 62 IN | WEIGHT: 129 LBS | OXYGEN SATURATION: 98 % | RESPIRATION RATE: 18 BRPM | HEART RATE: 82 BPM

## 2025-06-11 DIAGNOSIS — B96.89 BACTERIAL SINUSITIS: Primary | ICD-10-CM

## 2025-06-11 DIAGNOSIS — J32.9 BACTERIAL SINUSITIS: Primary | ICD-10-CM

## 2025-06-11 PROCEDURE — 99203 OFFICE O/P NEW LOW 30 MIN: CPT | Performed by: STUDENT IN AN ORGANIZED HEALTH CARE EDUCATION/TRAINING PROGRAM

## 2025-06-11 PROCEDURE — 3078F DIAST BP <80 MM HG: CPT | Performed by: STUDENT IN AN ORGANIZED HEALTH CARE EDUCATION/TRAINING PROGRAM

## 2025-06-11 PROCEDURE — 3074F SYST BP LT 130 MM HG: CPT | Performed by: STUDENT IN AN ORGANIZED HEALTH CARE EDUCATION/TRAINING PROGRAM

## 2025-06-12 NOTE — PATIENT INSTRUCTIONS
Thank you for trusting [facility] for your medical care today. You were seen by Bo Morris MD. We hope that we were able to answer all of your questions, alleviate concerns, and create a plan going forward. If you need anything from us, don't hesitate to reach out.     If you develop any new or worsening symptoms that you believe need urgent or emergent evaluation, be sure to be reevaluated in urgent care or the emergency room.     Bo Morris MD  Urgent Care

## 2025-06-12 NOTE — PROGRESS NOTES
"Urgent Care Visit Note  [facility]    06/11/25    Kristen Carrera     [address]  [place] [address]     PCP: Binta To     Subjective:     Chief Complaint   Patient presents with    Cough     C/o cough, congestion, yellow x2 weeks.        HPI:  Kristen Carrera is a 74 y.o. female who presents for 2 weeks of congestion, rhinorrhea that is yellow/green, and coughing.  The patient reports being exposed to her grandson who was sick with similar symptoms.  The patient reports that the symptoms are not improving over the last 2 weeks.  No fever or chills.  No difficulty breathing.    ROS:  ROS     CURRENT MEDICATIONS:  Encounter Medications with Dispense Information[1]    ALLERGIES:   Allergies[2]    PROBLEM LIST:    does not have any pertinent problems on file.    Allergies, Medications, & Tobacco/Substance Use were reconciled by the Medical Assistant and reviewed by myself.     Objective:     /78 (BP Location: Left arm, Patient Position: Sitting, BP Cuff Size: Adult)   Pulse 82   Temp 36.7 °C (98.1 °F) (Temporal)   Resp 18   Ht 1.575 m (5' 2\") Comment: Pt reported  Wt 58.5 kg (129 lb)   SpO2 98%   BMI 23.59 kg/m²     Physical Exam  HENT:      Head: Normocephalic and atraumatic.      Right Ear: External ear normal.      Left Ear: External ear normal.      Nose: Congestion and rhinorrhea present.      Right Sinus: Maxillary sinus tenderness present.      Left Sinus: Maxillary sinus tenderness present.      Mouth/Throat:      Mouth: Mucous membranes are moist.      Pharynx: No oropharyngeal exudate or posterior oropharyngeal erythema.   Eyes:      General:         Right eye: No discharge.         Left eye: No discharge.      Extraocular Movements: Extraocular movements intact.      Pupils: Pupils are equal, round, and reactive to light.   Cardiovascular:      Rate and Rhythm: Normal rate and regular rhythm.      Pulses: Normal pulses.   Pulmonary:      Effort: Pulmonary effort is normal. No " respiratory distress.      Breath sounds: Normal breath sounds. No stridor. No wheezing, rhonchi or rales.   Abdominal:      General: Abdomen is flat.   Musculoskeletal:      Cervical back: No tenderness.   Lymphadenopathy:      Cervical: No cervical adenopathy.   Skin:     General: Skin is warm.      Capillary Refill: Capillary refill takes less than 2 seconds.   Neurological:      Mental Status: She is alert and oriented to person, place, and time.   Psychiatric:         Mood and Affect: Mood normal.         Behavior: Behavior normal.           Lab Results/POC Test Results         Assessment/Plan:     Patient's history and physical exam consistent with:    Assessment & Plan  Bacterial sinusitis    Orders:    amoxicillin-clavulanate (AUGMENTIN) 875-125 MG Tab; Take 1 Tablet by mouth 2 times a day for 5 days.      Likely bacterial maxillary sinusitis with postnasal drip causing the cough.  Normal respiratory exam.  Low suspicion for pneumonia.  No shortness of breath and no fever.  Level of discharge, facial tenderness, and lack of improvement over 2 weeks suggests bacterial etiology for sinusitis.  Treating empirically with Augmentin.  Educated on supportive care otherwise.  Given return precautions regarding any new or worsening symptoms.    Discussed differential diagnosis, management options, risks/benefits, and alternatives to planned treatment. Pt expressed understanding and the treatment plan was agreed upon. Questions were encouraged and answered. Pt encouraged to return to urgent care as needed if new or worsening symptoms or if there is no improvement in condition. Pt educated in red flags and indications to immediately call 911 or present to the Emergency Department. Advised the patient to follow-up with the primary care physician for recheck, reevaluation, and further management.    I personally reviewed prior external notes and test results pertinent to today's visit. I have independently reviewed and  interpreted all diagnostics ordered during this visit.    Please note that this dictation was created using voice recognition software. I have made a reasonable attempt to correct obvious errors, but I expect that there are errors of grammar and possibly content that I did not discover before finalizing the note.    This note was electronically signed by Bo Morris MD               [1]   Current Outpatient Medications   Medication Sig Refill Last Dispense    alendronate (FOSAMAX) 70 MG Tab Take 1 Tablet by mouth every 7 days. 4 Unknown (outside pharmacy)    amoxicillin-clavulanate (AUGMENTIN) 875-125 MG Tab Take 1 Tablet by mouth 2 times a day for 5 days. 0 Unknown (outside pharmacy)    amphetamine-dextroamphetamine (ADDERALL, 10MG,) 10 MG Tab Take 1 Tablet by mouth 1/2 hour after lunch for 30 days. 0 Unknown (outside pharmacy)    [START ON 7/2/2025] amphetamine-dextroamphetamine (ADDERALL, 10MG,) 10 MG Tab Take 1 Tablet by mouth 1/2 hour after lunch for 30 days. 0 Unknown (outside pharmacy)    [START ON 6/26/2025] amphetamine-dextroamphetamine ER (ADDERALL XR) 30 MG XR capsule Take 1 Capsule by mouth every morning for 30 days. 0 Unknown (outside pharmacy)    [START ON 7/27/2025] amphetamine-dextroamphetamine ER (ADDERALL XR) 30 MG XR capsule Take 1 Capsule by mouth every morning for 30 days. 0 Unknown (outside pharmacy)    [START ON 8/27/2025] amphetamine-dextroamphetamine ER (ADDERALL XR) 30 MG XR capsule Take 1 Capsule by mouth every morning for 30 days. 0 Unknown (outside pharmacy)    aspirin 81 MG EC tablet Take 1 Tablet by mouth every day. (Patient not taking: Reported on 12/9/2024) 3 Unknown (outside pharmacy)    Calcium Carbonate-Vitamin D (CALCIUM-D PO) Take 2 Tablets by mouth every day.  Unknown (patient-reported)    ibuprofen (MOTRIN) 600 MG Tab TAKE 1 TABLET BY MOUTH 4 TIMES DAILY FOR 14 DAYS AS NEEDED  Unknown (patient-reported)    lisinopril (PRINIVIL) 20 MG Tab Take 1 Tablet by mouth every day. 3  "Unknown (outside pharmacy)    MAGNESIUM PO   Unknown (patient-reported)    methocarbamol (ROBAXIN) 500 MG Tab TAKE 1 TABLET BY MOUTH 4 TIMES DAILY AS NEEDED FOR MUSCLE SPASM FOR 7 DAYS  Unknown (patient-reported)    rosuvastatin (CRESTOR) 20 MG Tab Take 1 Tablet by mouth every evening. 3 Unknown (outside pharmacy)    tolterodine ER (DETROL LA) 4 MG CAPSULE SR 24 HR   Unknown (patient-reported)    traZODone (DESYREL) 100 MG Tab Take 1.5 Tablets by mouth at bedtime as needed for Sleep. 0 Unknown (outside pharmacy)   [2]   Allergies  Allergen Reactions    Hydrocodone      Nightmares and \"makes my skin crawl\"    Hydrocodone-Acetaminophen Unspecified     "

## 2025-06-24 ENCOUNTER — TELEPHONE (OUTPATIENT)
Dept: HEALTH INFORMATION MANAGEMENT | Facility: OTHER | Age: 75
End: 2025-06-24
Payer: MEDICARE

## 2025-06-30 PROBLEM — G31.9 CEREBRAL ATROPHY (HCC): Status: ACTIVE | Noted: 2025-06-30

## 2025-06-30 PROBLEM — F13.20 SEDATIVE DEPENDENCE (HCC): Status: ACTIVE | Noted: 2025-06-30

## 2025-06-30 NOTE — ASSESSMENT & PLAN NOTE
Chronic, stable. Currently taking trazodone 100 mg nightly. Reports an average of 9 hours of sleep per night. Denies excessive sedation and amnesia. Managed by psychiatry, Dr. Carrion.

## 2025-06-30 NOTE — ASSESSMENT & PLAN NOTE
Chronic, ongoing. Reports that she was previously on tolterodine ER 4 mg daily with little improvement in symptoms. Continues to use absorbent pads for urinary leakage. Reports that she was seen by urology and was recommended to undergo placement for a sacral neuromodulation device. Encouraged patient to follow up with urology, Dr. Castañeda.

## 2025-06-30 NOTE — ASSESSMENT & PLAN NOTE
Chronic, stable. Currently taking lisinopril 20 mg daily. In-office blood pressure is 136/80.  Denies chest pain, lightheadedness, and lower extremity edema. Continue to follow with primary care provider for medication and symptom management.

## 2025-06-30 NOTE — ASSESSMENT & PLAN NOTE
Stable. 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 Denies other recreational drug use. Followed by primary care provider.   [employer] dose 1 and 2 This was a shared visit with the [facility]. I reviewed and verified the documentation and independently performed the documented:

## 2025-06-30 NOTE — ASSESSMENT & PLAN NOTE
Chronic, stable. Reviewed most recent lipid profile from February 2024. Currently taking rosuvastatin 20 mg daily. Provided education on following a heart healthy diet with adequate intake of fruits, vegetables, and whole grains. Encourage 30 minutes of moderate exercise 3-4 times a week. Provided [facility] gym resources. Denies chest pain and claudication. Routinely monitored by primary care provider.

## 2025-06-30 NOTE — ASSESSMENT & PLAN NOTE
Chronic, stable. Reviewed DEXA scan from October 2023. Continues on alendronate 70 mg weekly and supplements with calcium and vitamin D3 daily. Encourage weight-bearing activity. Denies recent falls or fractures. Monitored by primary care provider.

## 2025-06-30 NOTE — ASSESSMENT & PLAN NOTE
Chronic, ongoing. Reports recent completion of 7-week transcranial magnetic stimulation therapy with little change in mood. Referral placed to behavioral therapy for individual therapy. In-office PHQ-9 screening is 7/27. Denies SI/HI. Managed by psychiatry, Dr. Carrion.

## 2025-06-30 NOTE — ASSESSMENT & PLAN NOTE
Chronic, ongoing. Reviewed MRI of the brain from August 2023 with mild cerebral volume loss. Reports progressive changes in short-term memory. Denies visual disturbances and loss of coordination. Discussed use of memory games and word puzzles. Monitored by primary care provider.

## 2025-06-30 NOTE — ASSESSMENT & PLAN NOTE
Chronic, stable. Continues on amphetamine-dextroamphetamine ER 30 mg every morning and 10 mg after lunch. Managed by psychiatry, Dr. Carrion.

## 2025-07-01 ENCOUNTER — OFFICE VISIT (OUTPATIENT)
Dept: MEDICAL GROUP | Facility: MEDICAL CENTER | Age: 75
End: 2025-07-01
Payer: MEDICARE

## 2025-07-01 VITALS
HEIGHT: 62 IN | TEMPERATURE: 98.6 F | SYSTOLIC BLOOD PRESSURE: 136 MMHG | HEART RATE: 51 BPM | WEIGHT: 127.8 LBS | DIASTOLIC BLOOD PRESSURE: 80 MMHG | BODY MASS INDEX: 23.52 KG/M2

## 2025-07-01 DIAGNOSIS — E78.5 HYPERLIPIDEMIA, UNSPECIFIED HYPERLIPIDEMIA TYPE: ICD-10-CM

## 2025-07-01 DIAGNOSIS — I10 PRIMARY HYPERTENSION: ICD-10-CM

## 2025-07-01 DIAGNOSIS — Z12.31 SCREENING MAMMOGRAM, ENCOUNTER FOR: ICD-10-CM

## 2025-07-01 DIAGNOSIS — Z12.12 ENCOUNTER FOR COLORECTAL CANCER SCREENING: ICD-10-CM

## 2025-07-01 DIAGNOSIS — F15.11 HISTORY OF METHAMPHETAMINE ABUSE (HCC): ICD-10-CM

## 2025-07-01 DIAGNOSIS — G31.9 CEREBRAL ATROPHY (HCC): ICD-10-CM

## 2025-07-01 DIAGNOSIS — F13.20 SEDATIVE DEPENDENCE (HCC): ICD-10-CM

## 2025-07-01 DIAGNOSIS — M81.0 OSTEOPOROSIS, UNSPECIFIED OSTEOPOROSIS TYPE, UNSPECIFIED PATHOLOGICAL FRACTURE PRESENCE: ICD-10-CM

## 2025-07-01 DIAGNOSIS — N39.41 URGE INCONTINENCE OF URINE: ICD-10-CM

## 2025-07-01 DIAGNOSIS — Z12.11 ENCOUNTER FOR COLORECTAL CANCER SCREENING: ICD-10-CM

## 2025-07-01 DIAGNOSIS — F33.2 MDD (MAJOR DEPRESSIVE DISORDER), RECURRENT SEVERE, WITHOUT PSYCHOSIS (HCC): ICD-10-CM

## 2025-07-01 DIAGNOSIS — F90.0 ADHD (ATTENTION DEFICIT HYPERACTIVITY DISORDER), INATTENTIVE TYPE: ICD-10-CM

## 2025-07-01 PROCEDURE — 1126F AMNT PAIN NOTED NONE PRSNT: CPT

## 2025-07-01 PROCEDURE — 3075F SYST BP GE 130 - 139MM HG: CPT

## 2025-07-01 PROCEDURE — G0439 PPPS, SUBSEQ VISIT: HCPCS

## 2025-07-01 PROCEDURE — 3079F DIAST BP 80-89 MM HG: CPT

## 2025-07-01 SDOH — ECONOMIC STABILITY: FOOD INSECURITY: HOW HARD IS IT FOR YOU TO PAY FOR THE VERY BASICS LIKE FOOD, HOUSING, MEDICAL CARE, AND HEATING?: NOT HARD AT ALL

## 2025-07-01 SDOH — ECONOMIC STABILITY: FOOD INSECURITY: WITHIN THE PAST 12 MONTHS, YOU WORRIED THAT YOUR FOOD WOULD RUN OUT BEFORE YOU GOT THE MONEY TO BUY MORE.: NEVER TRUE

## 2025-07-01 SDOH — ECONOMIC STABILITY: FOOD INSECURITY: WITHIN THE PAST 12 MONTHS, THE FOOD YOU BOUGHT JUST DIDN'T LAST AND YOU DIDN'T HAVE MONEY TO GET MORE.: NEVER TRUE

## 2025-07-01 SDOH — ECONOMIC STABILITY: TRANSPORTATION INSECURITY: IN THE PAST 12 MONTHS, HAS LACK OF TRANSPORTATION KEPT YOU FROM MEDICAL APPOINTMENTS OR FROM GETTING MEDICATIONS?: NO

## 2025-07-01 ASSESSMENT — PATIENT HEALTH QUESTIONNAIRE - PHQ9
CLINICAL INTERPRETATION OF PHQ2 SCORE: 2
SUM OF ALL RESPONSES TO PHQ QUESTIONS 1-9: 7
5. POOR APPETITE OR OVEREATING: 1 - SEVERAL DAYS

## 2025-07-01 ASSESSMENT — ACTIVITIES OF DAILY LIVING (ADL)
LACK_OF_TRANSPORTATION: NO
BATHING_REQUIRES_ASSISTANCE: 0

## 2025-07-01 ASSESSMENT — ENCOUNTER SYMPTOMS: GENERAL WELL-BEING: FAIR

## 2025-07-01 ASSESSMENT — PAIN SCALES - GENERAL: PAINLEVEL_OUTOF10: NO PAIN

## 2025-07-01 NOTE — PROGRESS NOTES
Comprehensive Health Assessment Program     Kristen Carrera is a 74 y.o. here for her comprehensive health assessment.    Patient Active Problem List    Diagnosis Date Noted    Screening mammogram, encounter for 07/01/2025    Encounter for colorectal cancer screening 07/01/2025    Sedative dependence (HCC) 06/30/2025    Cerebral atrophy (HCC) 06/30/2025    Chronic pain of both shoulders 10/11/2024    Bilateral hip pain 10/11/2024    Subacute cough 10/11/2024    Mild cognitive impairment with memory loss 04/22/2024    Risk for falls 01/10/2024    Complaints of memory disturbance 07/03/2023    History of alcohol abuse 07/03/2023    History of methamphetamine abuse (HCC) 06/29/2023    MDD (major depressive disorder), recurrent severe, without psychosis (HCC) 06/01/2023    Vision loss, bilateral 02/02/2023    Severe MAX (obstructive sleep apnea) 07/26/2022    Urinary incontinence 10/28/2021    Primary hypertension 10/18/2018    Insomnia 09/28/2018    CMC arthritis 07/15/2016    Midline low back pain without sciatica 08/04/2015    Osteoporosis 08/19/2014    Chronic pain of left ankle 02/29/2012    Hypersomnolence 01/17/2012    PATENT FORAMEN OVALE LEFT TO RIGHT SHUNT txed w/ asa- has seen cardio dr salcedo 12/08/2010    Herpes, genital 10/12/2010    ADHD (attention deficit hyperactivity disorder), inattentive type     Hyperlipidemia        Current Medications[1]       Current supplements as per medication list.     Allergies:   Hydrocodone and Hydrocodone-acetaminophen  Social History[2]  Family History   Problem Relation Age of Onset    Hypertension Mother     Cancer Mother         UTERUS    Heart Disease Mother         Arteriosclerotic Cardiovascular Disease    Other Mother         Coronary Artery Bypass Graft    Psychiatric Illness Mother     Arthritis Mother     Cancer Father         PANCREATIC    Cancer Sister 51        BREAST    Breast Cancer Sister     Drug abuse Granddaughter         heroin    Stroke Neg Hx      Diabetes Neg Hx     Lung Disease Neg Hx      Kristen  has a past medical history of ADD (attention deficit disorder), Anxiety, Arthritis, CTS (carpal tunnel syndrome), Depression, Fatigue (11/08/2011), H/O fracture of ankle (09/09/2022), History of abdominoplasty (03/15/2012), History of hypothyroidism, Hyperlipidemia, Hypothyroidism, Midline low back pain without sciatica (08/04/2015), Moderate episode of recurrent major depressive disorder (HCC) (10/06/2015), Osteoporosis (08/19/2014), and PATENT FORAMEN OVALE LEFT TO RIGHT SHUNT (12/08/2010).   Past Surgical History[3]    Screening:  In the last six months have you experienced any leakage of urine? Yes    Depression Screening  Little interest or pleasure in doing things?  1 - several days  Feeling down, depressed , or hopeless? 1 - several days  Trouble falling or staying asleep, or sleeping too much?  1 - several days  Feeling tired or having little energy?  1 - several days  Poor appetite or overeating?  1 - several days  Feeling bad about yourself - or that you are a failure or have let yourself or your family down? 1 - several days  Trouble concentrating on things, such as reading the newspaper or watching television? 1 - several days  Moving or speaking so slowly that other people could have noticed.  Or the opposite - being so fidgety or restless that you have been moving around a lot more than usual?  0 - not at all  Thoughts that you would be better off dead, or of hurting yourself?  0 - not at all  Patient Health Questionnaire Score: 7    If depressive symptoms identified deferred to follow up visit unless specifically addressed in assessment and plan.    Interpretation of PHQ-9 Total Score   Score Severity   1-4 No Depression   5-9 Mild Depression   10-14 Moderate Depression   15-19 Moderately Severe Depression   20-27 Severe Depression    Screening for Cognitive Impairment  Do you or any of your friends or family members have any concern about your  memory? Yes  Three Minute Recall (Village, Kitchen, Baby) 1/3    Otf clock face with all 12 numbers and set the hands to show 10 minutes past 11.  Yes    Cognitive concerns identified deferred for follow up unless specifically addressed in assessment and plan.    Fall Risk Assessment  Has the patient had two or more falls in the last year or any fall with injury in the last year?  No    Safety Assessment  Do you always wear your seatbelt?  Yes  Any changes to home needed to function safely? No  Difficulty hearing.  Yes  Patient counseled about all safety risks that were identified.    Functional Assessment ADLs  Are there any barriers preventing you from cooking for yourself or meeting nutritional needs?  No.    Are there any barriers preventing you from driving safely or obtaining transportation?  No.    Are there any barriers preventing you from using a telephone or calling for help?  No    Are there any barriers preventing you from shopping?  No.    Are there any barriers preventing you from taking care of your own finances?  No    Are there any barriers preventing you from managing your medications?  No    Are there any barriers preventing you from showering, bathing or dressing yourself? No    Are there any barriers preventing you from doing housework or laundry? No    Are there any barriers preventing you from using the toilet?No    Are you currently engaging in any exercise or physical activity?  No.      Self-Assessment of Health  What is your perception of your health? Fair    Do you sleep more than six hours a night? Yes    In the past 7 days, how much did pain keep you from doing your normal work? None    Do you spend quality time with family or friends (virtually or in person)? Yes    Do you usually eat a heart healthy diet that constists of a variety of fruits, vegetables, whole grains and fiber? No    Do you eat foods high in fat and/or Fast Food more than three times per week? No    How concerned are  you that your medical conditions are not being well managed? Not at all    Are you worried that in the next 2 months, you may not have stable housing that you own, rent, or stay in as part of a household? No        Advance Care Planning  Do you have an Advance Directive, Living Will, Durable Power of , or POLST? No   Provided patient with educational brochure regarding Advance Care Planning.                  Health Maintenance Summary            Current Care Gaps       Zoster (Shingles) Vaccines (1 of 2) Never done     No completion history exists for this topic.                      Awaiting Completion       Colorectal Cancer Screening (Colonoscopy - Every 10 Years) Order placed this encounter      07/01/2025  Order placed for Cologuard® colon cancer screening by TOSHA TavarezRZafarN.    11/23/2012  Colonoscopy (Previously completed)    01/19/2012  OCCULT BLOOD X3 (STOOL)              Mammogram (Yearly) Order placed this encounter      07/01/2025  Order placed for MA-SCREENING MAMMO BILAT W/TOMOSYNTHESIS W/CAD by TOSHA TavarezRZafarN.    10/13/2023  MA-SCREENING MAMMO BILAT W/IMPLANTS W/GARY W/CAD    08/23/2022  MA-SCREENING MAMMO BILAT W/IMPLANTS W/GARY W/CAD    09/11/2018  MA-MAMMO SCREEN BILAT IMPLANTS GARY CAD    02/06/2017  MA-MAMMO SCREEN BILAT IMPLANTS GARY CAD     Only the first 5 history entries have been loaded, but more history exists.                    Upcoming       Influenza Vaccine (1) Next due on 9/1/2025      No completion history exists for this topic.              Annual Wellness Visit (Yearly) Next due on 7/1/2026 07/01/2025  Level of Service: WI ANNUAL WELLNESS VISIT-INCLUDES PPPS SUBSEQUE*    12/09/2024  Level of Service: WI PREVENTIVE VISIT,EST,65 & OVER    01/10/2024  Level of Service: ANNUAL WELLNESS VISIT-INCLUDES PPPS SUBSEQUE*    01/09/2023  Level of Service: WI ANNUAL WELLNESS VISIT-INCLUDES PPPS SUBSEQUE*              IMM DTaP/Tdap/Td Vaccine (2 - Td or Tdap)  Next due on 2018  Imm Admin: Tdap Vaccine              Bone Density Scan (Every 5 Years) Next due on 10/13/2028      10/13/2023  DS-BONE DENSITY STUDY (DEXA)    2018  DS-BONE DENSITY STUDY (DEXA)    2014  DS-BONE DENSITY STUDY (DEXA)                      Completed or No Longer Recommended       Hepatitis C Screening  Completed      2023  Hepatitis C Antibody component of HCV Scrn ( 0581-0236 1xLife)              Pneumococcal Vaccine: 50+ Years (Series Information) Completed      2018  Imm Admin: Pneumococcal polysaccharide vaccine (PPSV-23)    10/06/2015  Imm Admin: Pneumococcal Conjugate Vaccine (Prevnar/PCV-13)              Hepatitis A Vaccine (Hep A) (Series Information) Aged Out     No completion history exists for this topic.              Hepatitis B Vaccine (Hep B) (Series Information) Aged Out     No completion history exists for this topic.              HPV Vaccines (Series Information) Aged Out     No completion history exists for this topic.              Polio Vaccine (Inactivated Polio) (Series Information) Aged Out     No completion history exists for this topic.              Meningococcal Immunization (Series Information) Aged Out     No completion history exists for this topic.              Meningococcal B Vaccine (Series Information) Aged Out     No completion history exists for this topic.              Cervical Cancer Screening  Discontinued        Frequency changed to Never automatically (Topic No Longer Applies)    2014  Done    10/23/2011  Previously completed              COVID-19 Vaccine  Discontinued     No completion history exists for this topic.                            Patient Care Team:  Binta To M.D. as PCP - General (Internal Medicine)  Helena Cuevas M.D. (Inactive) as Consulting Physician (Rheumatology)  Harjit Abel N.P. (Nurse Practitioner Psychiatric/Mental Health)  Michele Li M.D. (Neurology)  Yesi  "ROCHELLE Self (Sleep Medicine)  ELMO Richmond (Nurse Practitioner)  isleep as Respiratory Therapist (DME Supplier)    Financial Resource Strain: Low Risk  (7/1/2025)    Overall Financial Resource Strain (CARDIA)     Difficulty of Paying Living Expenses: Not hard at all      Transportation Needs: No Transportation Needs (7/1/2025)    PRAPARE - Transportation     Lack of Transportation (Medical): No     Lack of Transportation (Non-Medical): No      Food Insecurity: No Food Insecurity (7/1/2025)    Hunger Vital Sign     Worried About Running Out of Food in the Last Year: Never true     Ran Out of Food in the Last Year: Never true        Encounter Vitals  Temperature: 37 °C (98.6 °F)  Blood Pressure : 136/80  Pulse: (!) 51  Weight: 58 kg (127 lb 12.8 oz)  Height: 157.5 cm (5' 2\")  BMI (Calculated): 23.37  Pain Score: No pain     ROS:  No fever, chills, nausea, vomiting, diarrhea, chest pain or shortness of breath. See HPI.    Physical Exam  Vitals reviewed.   Constitutional:       Appearance: Normal appearance.   Cardiovascular:      Rate and Rhythm: Normal rate and regular rhythm.      Pulses: Normal pulses.      Heart sounds: Normal heart sounds.   Pulmonary:      Effort: Pulmonary effort is normal.      Breath sounds: Normal breath sounds.   Skin:     General: Skin is warm and dry.      Capillary Refill: Capillary refill takes less than 2 seconds.   Neurological:      General: No focal deficit present.      Mental Status: She is alert and oriented to person, place, and time.   Psychiatric:         Mood and Affect: Mood normal.       Assessment and Plan. The following treatment and monitoring plan is recommended:    ADHD (attention deficit hyperactivity disorder), inattentive type  Chronic, stable. Continues on amphetamine-dextroamphetamine ER 30 mg every morning and 10 mg after lunch. Managed by psychiatry, Dr. Carrion.    Cerebral atrophy (HCC)  Chronic, ongoing. Reviewed MRI of the brain from " August 2023 with mild cerebral volume loss. Reports progressive changes in short-term memory. Denies visual disturbances and loss of coordination. Discussed use of memory games and word puzzles. Monitored by primary care provider.    History of methamphetamine abuse (HCC)  Stable. 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 Denies other recreational drug use. Followed by primary care provider.    Hyperlipidemia  Chronic, stable. Reviewed most recent lipid profile from February 2024. Currently taking rosuvastatin 20 mg daily. Provided education on following a heart healthy diet with adequate intake of fruits, vegetables, and whole grains. Encourage 30 minutes of moderate exercise 3-4 times a week. Denies chest pain and claudication. Monitored by primary care provider.    MDD (major depressive disorder), recurrent severe, without psychosis ([facility])  Chronic, ongoing. In-office PHQ-9 screening is 7/27. Reports recent completion of 7-week transcranial magnetic stimulation therapy with little change in mood. Referral placed to behavioral therapy for individual therapy. Denies SI/HI. Managed by psychiatry, Dr. Carrion.    Osteoporosis  Chronic, stable. Reviewed DEXA scan from October 2023. Continues on alendronate 70 mg weekly and supplements with calcium and vitamin D3 daily. Encourage weight-bearing activity. Denies recent falls or fractures. Monitored by primary care provider.    Primary hypertension  Chronic, stable. Currently taking lisinopril 20 mg daily. In-office blood pressure is 136/80. Denies chest pain, lightheadedness, and lower extremity edema. Continue to follow with primary care provider for medication and symptom management.    Sedative dependence (HCC)  Chronic, stable. Currently taking trazodone 100 mg nightly. Reports an average of 9 hours of sleep per night. Denies excessive sedation and amnesia. Managed by psychiatry, Dr. Carrion.    Urinary incontinence  Chronic,  ongoing. Reports that she was previously on tolterodine ER 4 mg daily with little improvement in symptoms. Continues to use absorbent pads for urinary leakage. Reports that she was previously seen by urology and was recommended to undergo placement for a sacral neuromodulation device. Encouraged patient to follow up with urology, Dr. Castañeda.     Encounter for colorectal cancer screening  Cologuard ordered.    Screening mammogram, encounter for  Mammogram ordered.        Services suggested: No services needed at this time  Health Care Screening: Age-appropriate preventive services recommended by USPTF and ACIP covered by Medicare were discussed today. Services ordered if indicated and agreed upon by the patient.  Referrals offered: Community-based lifestyle interventions to reduce health risks and promote self-management and wellness, fall prevention, nutrition, physical activity, tobacco-use cessation, weight loss, and mental health services as per orders if indicated.    Discussion today about general wellness and lifestyle habits:    Prevent falls and reduce trip hazards; Cautioned about securing or removing rugs.  Have a working fire alarm and carbon monoxide detector.  Engage in regular physical activity and social activities.    Follow-up: Return for appointment with Primary Care Provider as needed.              [1]   Current Outpatient Medications   Medication Sig Dispense Refill    amphetamine-dextroamphetamine (ADDERALL, 10MG,) 10 MG Tab Take 1 Tablet by mouth 1/2 hour after lunch for 30 days. 30 Tablet 0    amphetamine-dextroamphetamine ER (ADDERALL XR) 30 MG XR capsule Take 1 Capsule by mouth every morning for 30 days. 30 Capsule 0    [START ON 7/27/2025] amphetamine-dextroamphetamine ER (ADDERALL XR) 30 MG XR capsule Take 1 Capsule by mouth every morning for 30 days. 30 Capsule 0    [START ON 8/27/2025] amphetamine-dextroamphetamine ER (ADDERALL XR) 30 MG XR capsule Take 1 Capsule by mouth every morning  for 30 days. 30 Capsule 0    [START ON 7/2/2025] amphetamine-dextroamphetamine (ADDERALL, 10MG,) 10 MG Tab Take 1 Tablet by mouth 1/2 hour after lunch for 30 days. 30 Tablet 0    traZODone (DESYREL) 100 MG Tab Take 1.5 Tablets by mouth at bedtime as needed for Sleep. 45 Tablet 0    rosuvastatin (CRESTOR) 20 MG Tab Take 1 Tablet by mouth every evening. 100 Tablet 3    lisinopril (PRINIVIL) 20 MG Tab Take 1 Tablet by mouth every day. 100 Tablet 3    ibuprofen (MOTRIN) 600 MG Tab TAKE 1 TABLET BY MOUTH 4 TIMES DAILY FOR 14 DAYS AS NEEDED      methocarbamol (ROBAXIN) 500 MG Tab TAKE 1 TABLET BY MOUTH 4 TIMES DAILY AS NEEDED FOR MUSCLE SPASM FOR 7 DAYS      alendronate (FOSAMAX) 70 MG Tab Take 1 Tablet by mouth every 7 days. 12 Tablet 4    MAGNESIUM PO       Calcium Carbonate-Vitamin D (CALCIUM-D PO) Take 2 Tablets by mouth every day.      aspirin 81 MG EC tablet Take 1 Tablet by mouth every day. (Patient not taking: Reported on 7/1/2025) 100 Tablet 3    tolterodine ER (DETROL LA) 4 MG CAPSULE SR 24 HR  (Patient not taking: Reported on 7/1/2025)       No current facility-administered medications for this visit.   [2]   Social History  Tobacco Use    Smoking status: Never    Smokeless tobacco: Never   Vaping Use    Vaping status: Never Used   Substance Use Topics    Alcohol use: Yes     Comment: 1-2 times a month    Drug use: Yes     Types: Marijuana     Comment: once a month   [3]   Past Surgical History:  Procedure Laterality Date    PB OPEN TX TRIMALLEOLAR ANKLE FX W/O FIX PST LIP Right 9/12/2022    Procedure: RIGHT TRIMALLEOLAR OPEN REDUCTION INTERNAL FIXATION, RIGHT SYNDESMOSIS OPEN REDUCTION INTERNAL FIXATION;  Surgeon: Trey Conway M.D.;  Location: [facility];  Service: Orthopedics    ABDOMINOPLASTY  1977    APPENDECTOMY      OTHER      TUMMY TUCK, BREAST REDUCTION, LIPOSUCTION OF HIPS AND THIGHS.    PB MAMMARY DUCTOGRAM, SINGLE      MN ANESTH,LOLA HYST FOL NEURAXIAL ANAL/ANES      MN  BREAST REDUCTION      TONSILLECTOMY      TUBAL COAGULATION LAPAROSCOPIC BILATERAL

## 2025-07-09 NOTE — Clinical Note
REFERRAL APPROVAL NOTICE         Sent on July 9, 2025                   Angeles Carrera  [address]  [address]  [place] [address]                   Dear Ms. Carrera,    After a careful review of the medical information and benefit coverage, [facility] has processed your referral. See below for additional details.    If applicable, you must be actively enrolled with your insurance for coverage of the authorized service. If you have any questions regarding your coverage, please contact your insurance directly.    REFERRAL INFORMATION   Referral #:  [id number]  Referred-To Department    Referred-By Provider:  Behavioral Health    ELMO Tavarez   Behavioral Health Outpatient      [address]  [place] [address]  [phone] [address]  [place] [id number] [phone]    Referral Start Date:  07/01/2025  Referral End Date:   07/01/2026             SCHEDULING  If you do not already have an appointment, please call [phone] to make an appointment.     MORE INFORMATION  If you do not already have a [facility] account, sign up at: [email or web address]  You can access your medical information, make appointments, see lab results, billing information, and more.  If you have questions regarding this referral, please contact  the [facility] Referrals department at:             [phone]. Monday - Friday 8:00AM - 5:00PM.     Sincerely,    [facility]

## 2025-07-23 ENCOUNTER — APPOINTMENT (OUTPATIENT)
Dept: MEDICAL GROUP | Facility: MEDICAL CENTER | Age: 75
End: 2025-07-23
Payer: MEDICARE

## 2025-07-24 ENCOUNTER — OFFICE VISIT (OUTPATIENT)
Dept: MEDICAL GROUP | Facility: MEDICAL CENTER | Age: 75
End: 2025-07-24
Attending: INTERNAL MEDICINE
Payer: MEDICARE

## 2025-07-24 VITALS
SYSTOLIC BLOOD PRESSURE: 112 MMHG | WEIGHT: 126 LBS | HEART RATE: 62 BPM | HEIGHT: 62 IN | RESPIRATION RATE: 16 BRPM | OXYGEN SATURATION: 94 % | DIASTOLIC BLOOD PRESSURE: 70 MMHG | TEMPERATURE: 98 F | BODY MASS INDEX: 23.19 KG/M2

## 2025-07-24 DIAGNOSIS — G89.29 CHRONIC BILATERAL LOW BACK PAIN WITHOUT SCIATICA: ICD-10-CM

## 2025-07-24 DIAGNOSIS — T85.49XD DEFLATION OF BREAST IMPLANT, SUBSEQUENT ENCOUNTER: ICD-10-CM

## 2025-07-24 DIAGNOSIS — D17.22 LIPOMA OF LEFT UPPER EXTREMITY: Primary | ICD-10-CM

## 2025-07-24 DIAGNOSIS — M54.50 CHRONIC BILATERAL LOW BACK PAIN WITHOUT SCIATICA: ICD-10-CM

## 2025-07-24 PROBLEM — T85.49XA BREAST IMPLANT DEFLATION: Status: ACTIVE | Noted: 2025-07-24

## 2025-07-24 PROBLEM — Z12.31 SCREENING MAMMOGRAM, ENCOUNTER FOR: Status: RESOLVED | Noted: 2025-07-01 | Resolved: 2025-07-24

## 2025-07-24 PROCEDURE — 99213 OFFICE O/P EST LOW 20 MIN: CPT | Performed by: INTERNAL MEDICINE

## 2025-07-24 PROCEDURE — 99214 OFFICE O/P EST MOD 30 MIN: CPT | Performed by: INTERNAL MEDICINE

## 2025-07-24 PROCEDURE — 3074F SYST BP LT 130 MM HG: CPT | Performed by: INTERNAL MEDICINE

## 2025-07-24 PROCEDURE — 3078F DIAST BP <80 MM HG: CPT | Performed by: INTERNAL MEDICINE

## 2025-07-24 RX ORDER — IBUPROFEN 600 MG/1
600 TABLET, FILM COATED ORAL EVERY 8 HOURS PRN
Qty: 30 TABLET | Refills: 3 | Status: SHIPPED | OUTPATIENT
Start: 2025-07-24

## 2025-07-24 RX ORDER — METHOCARBAMOL 500 MG/1
500 TABLET, FILM COATED ORAL 2 TIMES DAILY PRN
Qty: 60 TABLET | Refills: 3 | Status: SHIPPED | OUTPATIENT
Start: 2025-07-24

## 2025-07-25 PROBLEM — R05.2 SUBACUTE COUGH: Status: RESOLVED | Noted: 2024-10-11 | Resolved: 2025-07-25

## 2025-07-25 PROBLEM — F33.2 MDD (MAJOR DEPRESSIVE DISORDER), RECURRENT SEVERE, WITHOUT PSYCHOSIS (HCC): Status: RESOLVED | Noted: 2023-06-01 | Resolved: 2025-07-25

## 2025-07-25 PROBLEM — Z12.11 ENCOUNTER FOR COLORECTAL CANCER SCREENING: Status: RESOLVED | Noted: 2025-07-01 | Resolved: 2025-07-25

## 2025-07-25 PROBLEM — R41.3 COMPLAINTS OF MEMORY DISTURBANCE: Status: RESOLVED | Noted: 2023-07-03 | Resolved: 2025-07-25

## 2025-07-25 PROBLEM — H54.3 VISION LOSS, BILATERAL: Status: RESOLVED | Noted: 2023-02-02 | Resolved: 2025-07-25

## 2025-07-25 PROBLEM — Z12.12 ENCOUNTER FOR COLORECTAL CANCER SCREENING: Status: RESOLVED | Noted: 2025-07-01 | Resolved: 2025-07-25

## 2025-07-25 NOTE — PROGRESS NOTES
Subjective:   Kristen Carrera is a 75 y.o. female here today for multiple concerns    Breast implant deflation  Patient first got breast implants when she was 28 years old.  She reports that for at least a few years she has noticed complete deflation of the left implant.  She also has noticed that the right implant seems to be very hard and firm.  She is interested in evaluation for implant removal.  She is requesting imaging to evaluate integrity of implants.    Lipoma of left upper extremity  She has a fairly large lipoma in the subcutaneous tissue over the left triceps region.  She noticed it specifically after weight loss.  It does not bother her, is not painful.  She just wanted to have it checked to make sure it was okay.    Chronic bilateral low back pain without sciatica  She reports intermittent low back pain as well as some muscle tension and spasms in the muscles of the shoulder girdle region.  She had a prescription for Robaxin and ibuprofen that were given to her by urgent care and she reports that this combination works very well for her when she needs it.  She is requesting refills today.       Current medicines (including changes today)  Current Medications[1]  She  has a past medical history of ADD (attention deficit disorder), Anxiety, Arthritis, CTS (carpal tunnel syndrome), Depression, Fatigue (11/08/2011), H/O fracture of ankle (09/09/2022), History of abdominoplasty (03/15/2012), History of hypothyroidism, Hyperlipidemia, Hypothyroidism, Midline low back pain without sciatica (08/04/2015), Moderate episode of recurrent major depressive disorder (HCC) (10/06/2015), Osteoporosis (08/19/2014), and PATENT FORAMEN OVALE LEFT TO RIGHT SHUNT (12/08/2010).         Objective:     Vitals:    07/24/25 1148   BP: 112/70   Pulse: 62   Resp: 16   Temp: 36.7 °C (98 °F)   SpO2: 94%     Body mass index is 23.05 kg/m².   Physical Exam:  Constitutional: Alert, no distress.  Skin: Warm, dry, good turgor, no rashes in  visible areas, large lipoma in the subcutaneous tissue of left triceps region that is nontender.  Eye: Equal, round and reactive, conjunctiva clear, lids normal.  Psych: Alert and oriented x3, normal affect and mood.      Assessment and Plan:   The following treatment plan was discussed    1. Lipoma of left upper extremity   Given that it is not bothersome to patient, we discussed no intervention needed at this time.  She will let me know if it becomes painful or if she decides she would like to have it removed.    2. Deflation of breast implant, subsequent encounter  She likely has capsular leak on the left and potentially scarring on the right.  We discussed obtaining a breast ultrasound to further evaluate the integrity of the implants.  I have also referred her to plastic surgery if she wishes to have them removed if they are not intact.  - Referral to Plastic Surgery  - US-BREAST BILAT-COMPLETE; Future    3. Chronic bilateral low back pain without sciatica  Stable, well controlled with current meds which were refilled today.   - methocarbamol (ROBAXIN) 500 MG Tab; Take 1 Tablet by mouth 2 times a day as needed (muscle spasms, pain).  Dispense: 60 Tablet; Refill: 3  - ibuprofen (MOTRIN) 600 MG Tab; Take 1 Tablet by mouth every 8 hours as needed for Moderate Pain.  Dispense: 30 Tablet; Refill: 3        Followup: Return if symptoms worsen or fail to improve.                [1]   Current Outpatient Medications   Medication Sig Dispense Refill    methocarbamol (ROBAXIN) 500 MG Tab Take 1 Tablet by mouth 2 times a day as needed (muscle spasms, pain). 60 Tablet 3    ibuprofen (MOTRIN) 600 MG Tab Take 1 Tablet by mouth every 8 hours as needed for Moderate Pain. 30 Tablet 3    amphetamine-dextroamphetamine ER (ADDERALL XR) 30 MG XR capsule Take 1 Capsule by mouth every morning for 30 days. 30 Capsule 0    [START ON 7/27/2025] amphetamine-dextroamphetamine ER (ADDERALL XR) 30 MG XR capsule Take 1 Capsule by mouth every  morning for 30 days. 30 Capsule 0    [START ON 8/27/2025] amphetamine-dextroamphetamine ER (ADDERALL XR) 30 MG XR capsule Take 1 Capsule by mouth every morning for 30 days. 30 Capsule 0    amphetamine-dextroamphetamine (ADDERALL, 10MG,) 10 MG Tab Take 1 Tablet by mouth 1/2 hour after lunch for 30 days. 30 Tablet 0    traZODone (DESYREL) 100 MG Tab Take 1.5 Tablets by mouth at bedtime as needed for Sleep. 45 Tablet 0    rosuvastatin (CRESTOR) 20 MG Tab Take 1 Tablet by mouth every evening. 100 Tablet 3    lisinopril (PRINIVIL) 20 MG Tab Take 1 Tablet by mouth every day. 100 Tablet 3    MAGNESIUM PO       Calcium Carbonate-Vitamin D (CALCIUM-D PO) Take 2 Tablets by mouth every day.      alendronate (FOSAMAX) 70 MG Tab Take 1 Tablet by mouth every 7 days. (Patient not taking: Reported on 7/24/2025) 12 Tablet 4     No current facility-administered medications for this visit.

## 2025-07-25 NOTE — ASSESSMENT & PLAN NOTE
She reports intermittent low back pain as well as some muscle tension and spasms in the muscles of the shoulder girdle region.  She had a prescription for Robaxin and ibuprofen that were given to her by urgent care and she reports that this combination works very well for her when she needs it.  She is requesting refills today.

## 2025-07-25 NOTE — ASSESSMENT & PLAN NOTE
She has a fairly large lipoma in the subcutaneous tissue over the left triceps region.  She noticed it specifically after weight loss.  It does not bother her, is not painful.  She just wanted to have it checked to make sure it was okay.

## 2025-07-25 NOTE — ASSESSMENT & PLAN NOTE
Patient first got breast implants when she was 28 years old.  She reports that for at least a few years she has noticed complete deflation of the left implant.  She also has noticed that the right implant seems to be very hard and firm.  She is interested in evaluation for implant removal.  She is requesting imaging to evaluate integrity of implants.

## 2025-07-29 NOTE — Clinical Note
REFERRAL APPROVAL NOTICE         Sent on July 29, 2025                   Angeles Carrera  [address]  [address]  [place] [address]                   Dear Ms. Carrera,    After a careful review of the medical information and benefit coverage, [facility] has processed your referral. See below for additional details.    If applicable, you must be actively enrolled with your insurance for coverage of the authorized service. If you have any questions regarding your coverage, please contact your insurance directly.    REFERRAL INFORMATION   Referral #:  [id number]  Referred-To Provider    Referred-By Provider:  Plastic Surgery    MARSHALL Mayes LADAWN M      [address]  [place] [address]  [phone] [address] [facility]  [address]  [place] [id number] [phone]    Referral Start Date:  07/24/2025  Referral End Date:   07/24/2026             SCHEDULING  If you do not already have an appointment, please call [phone] to make an appointment.     MORE INFORMATION  If you do not already have a [employer] account, sign up at: [email or web address]  You can access your medical information, make appointments, see lab results, billing information, and more.  If you have questions regarding this referral, please contact  the [facility] Referrals department at:             [phone]. Monday - Friday 8:00AM - 5:00PM.     Sincerely,    [facility]

## 2025-08-11 DIAGNOSIS — F90.0 ATTENTION DEFICIT HYPERACTIVITY DISORDER (ADHD), PREDOMINANTLY INATTENTIVE TYPE: ICD-10-CM

## 2025-08-11 RX ORDER — DEXTROAMPHETAMINE SACCHARATE, AMPHETAMINE ASPARTATE, DEXTROAMPHETAMINE SULFATE AND AMPHETAMINE SULFATE 2.5; 2.5; 2.5; 2.5 MG/1; MG/1; MG/1; MG/1
10 TABLET ORAL
Qty: 30 TABLET | Refills: 0 | Status: SHIPPED | OUTPATIENT
Start: 2025-08-11 | End: 2025-09-10